# Patient Record
Sex: FEMALE | Race: BLACK OR AFRICAN AMERICAN | Employment: OTHER | URBAN - METROPOLITAN AREA
[De-identification: names, ages, dates, MRNs, and addresses within clinical notes are randomized per-mention and may not be internally consistent; named-entity substitution may affect disease eponyms.]

---

## 2018-04-04 ENCOUNTER — HOSPITAL ENCOUNTER (EMERGENCY)
Age: 33
Discharge: HOME | End: 2018-04-04
Attending: EMERGENCY MEDICINE
Payer: COMMERCIAL

## 2018-04-04 ENCOUNTER — APPOINTMENT (OUTPATIENT)
Dept: CT IMAGING | Age: 33
End: 2018-04-04
Payer: COMMERCIAL

## 2018-04-04 VITALS
TEMPERATURE: 97.9 F | RESPIRATION RATE: 18 BRPM | BODY MASS INDEX: 37.18 KG/M2 | OXYGEN SATURATION: 99 % | WEIGHT: 220 LBS | HEART RATE: 73 BPM | DIASTOLIC BLOOD PRESSURE: 79 MMHG | SYSTOLIC BLOOD PRESSURE: 138 MMHG

## 2018-04-04 DIAGNOSIS — R07.89 CHEST WALL PAIN: Primary | ICD-10-CM

## 2018-04-04 LAB
BASOPHILS ABSOLUTE: 0.03 E9/L (ref 0–0.2)
BASOPHILS RELATIVE PERCENT: 0.4 % (ref 0–2)
CHP ED QC CHECK: YES
EOSINOPHILS ABSOLUTE: 0.1 E9/L (ref 0.05–0.5)
EOSINOPHILS RELATIVE PERCENT: 1.2 % (ref 0–6)
GFR AFRICAN AMERICAN: >60
GFR NON-AFRICAN AMERICAN: >60 ML/MIN/1.73
GLUCOSE BLD-MCNC: 97 MG/DL (ref 74–109)
HCT VFR BLD CALC: 37.9 % (ref 34–48)
HEMOGLOBIN: 12.2 G/DL (ref 11.5–15.5)
IMMATURE GRANULOCYTES #: 0.03 E9/L
IMMATURE GRANULOCYTES %: 0.4 % (ref 0–5)
LYMPHOCYTES ABSOLUTE: 1.9 E9/L (ref 1.5–4)
LYMPHOCYTES RELATIVE PERCENT: 22.4 % (ref 20–42)
MCH RBC QN AUTO: 28.8 PG (ref 26–35)
MCHC RBC AUTO-ENTMCNC: 32.2 % (ref 32–34.5)
MCV RBC AUTO: 89.6 FL (ref 80–99.9)
MONOCYTES ABSOLUTE: 0.4 E9/L (ref 0.1–0.95)
MONOCYTES RELATIVE PERCENT: 4.7 % (ref 2–12)
NEUTROPHILS ABSOLUTE: 6.03 E9/L (ref 1.8–7.3)
NEUTROPHILS RELATIVE PERCENT: 70.9 % (ref 43–80)
PDW BLD-RTO: 13.4 FL (ref 11.5–15)
PLATELET # BLD: 358 E9/L (ref 130–450)
PMV BLD AUTO: 10.6 FL (ref 7–12)
POC CHLORIDE: 108 MMOL/L (ref 100–108)
POC CREATININE: 0.8 MG/DL (ref 0.5–1)
POC POTASSIUM: 4 MMOL/L (ref 3.5–5)
POC SODIUM: 141 MMOL/L (ref 132–146)
PREGNANCY TEST URINE, POC: NEGATIVE
RBC # BLD: 4.23 E12/L (ref 3.5–5.5)
TROPONIN: <0.01 NG/ML (ref 0–0.03)
WBC # BLD: 8.5 E9/L (ref 4.5–11.5)

## 2018-04-04 PROCEDURE — 84484 ASSAY OF TROPONIN QUANT: CPT

## 2018-04-04 PROCEDURE — 6360000004 HC RX CONTRAST MEDICATION: Performed by: RADIOLOGY

## 2018-04-04 PROCEDURE — 82565 ASSAY OF CREATININE: CPT

## 2018-04-04 PROCEDURE — 2580000003 HC RX 258: Performed by: RADIOLOGY

## 2018-04-04 PROCEDURE — 2580000003 HC RX 258: Performed by: NURSE PRACTITIONER

## 2018-04-04 PROCEDURE — 84295 ASSAY OF SERUM SODIUM: CPT

## 2018-04-04 PROCEDURE — 36415 COLL VENOUS BLD VENIPUNCTURE: CPT

## 2018-04-04 PROCEDURE — 84132 ASSAY OF SERUM POTASSIUM: CPT

## 2018-04-04 PROCEDURE — 85025 COMPLETE CBC W/AUTO DIFF WBC: CPT

## 2018-04-04 PROCEDURE — 82435 ASSAY OF BLOOD CHLORIDE: CPT

## 2018-04-04 PROCEDURE — 82947 ASSAY GLUCOSE BLOOD QUANT: CPT

## 2018-04-04 PROCEDURE — 99284 EMERGENCY DEPT VISIT MOD MDM: CPT

## 2018-04-04 PROCEDURE — 71275 CT ANGIOGRAPHY CHEST: CPT

## 2018-04-04 RX ORDER — 0.9 % SODIUM CHLORIDE 0.9 %
500 INTRAVENOUS SOLUTION INTRAVENOUS ONCE
Status: COMPLETED | OUTPATIENT
Start: 2018-04-04 | End: 2018-04-04

## 2018-04-04 RX ORDER — PANTOPRAZOLE SODIUM 40 MG/1
40 TABLET, DELAYED RELEASE ORAL DAILY
COMMUNITY
End: 2019-09-19 | Stop reason: ALTCHOICE

## 2018-04-04 RX ORDER — SODIUM CHLORIDE 0.9 % (FLUSH) 0.9 %
10 SYRINGE (ML) INJECTION ONCE
Status: COMPLETED | OUTPATIENT
Start: 2018-04-04 | End: 2018-04-04

## 2018-04-04 RX ORDER — MULTIVIT-MIN/IRON/FOLIC ACID/K 18-600-40
2000 CAPSULE ORAL DAILY
COMMUNITY
End: 2019-12-19

## 2018-04-04 RX ADMIN — IOPAMIDOL 75 ML: 755 INJECTION, SOLUTION INTRAVENOUS at 11:09

## 2018-04-04 RX ADMIN — Medication 10 ML: at 11:09

## 2018-04-04 RX ADMIN — SODIUM CHLORIDE 500 ML: 9 INJECTION, SOLUTION INTRAVENOUS at 11:21

## 2018-04-04 ASSESSMENT — PAIN DESCRIPTION - DESCRIPTORS: DESCRIPTORS: SPASM;SHARP

## 2018-04-04 ASSESSMENT — PAIN DESCRIPTION - PAIN TYPE: TYPE: ACUTE PAIN

## 2018-04-04 ASSESSMENT — PAIN SCALES - GENERAL: PAINLEVEL_OUTOF10: 7

## 2018-04-04 ASSESSMENT — PAIN DESCRIPTION - LOCATION: LOCATION: CHEST

## 2018-05-02 LAB
EKG ATRIAL RATE: 78 BPM
EKG P AXIS: 55 DEGREES
EKG P-R INTERVAL: 162 MS
EKG Q-T INTERVAL: 388 MS
EKG QRS DURATION: 70 MS
EKG QTC CALCULATION (BAZETT): 442 MS
EKG R AXIS: 24 DEGREES
EKG T AXIS: 13 DEGREES
EKG VENTRICULAR RATE: 78 BPM

## 2018-12-16 ENCOUNTER — HOSPITAL ENCOUNTER (OUTPATIENT)
Age: 33
Setting detail: OBSERVATION
Discharge: HOME | End: 2018-12-19
Attending: EMERGENCY MEDICINE | Admitting: SURGERY
Payer: COMMERCIAL

## 2018-12-16 ENCOUNTER — APPOINTMENT (OUTPATIENT)
Dept: CT IMAGING | Age: 33
End: 2018-12-16
Payer: COMMERCIAL

## 2018-12-16 ENCOUNTER — APPOINTMENT (OUTPATIENT)
Dept: GENERAL RADIOLOGY | Age: 33
End: 2018-12-16
Payer: COMMERCIAL
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
## 2018-12-16 DIAGNOSIS — S80.12XA CONTUSION OF MULTIPLE SITES OF LEFT LOWER EXTREMITY, INITIAL ENCOUNTER: ICD-10-CM

## 2018-12-16 PROBLEM — T14.90XA TRAUMA: Status: ACTIVE | Noted: 2018-12-16

## 2018-12-16 LAB
ABO/RH: NORMAL
ACETAMINOPHEN LEVEL: <5 MCG/ML (ref 10–30)
ALBUMIN SERPL-MCNC: 3.9 G/DL (ref 3.5–5.2)
ALP BLD-CCNC: 96 U/L (ref 35–104)
ALT SERPL-CCNC: 21 U/L (ref 0–32)
ANION GAP SERPL CALCULATED.3IONS-SCNC: 12 MMOL/L (ref 7–16)
ANTIBODY SCREEN: NORMAL
APTT: 32.4 SEC (ref 24.5–35.1)
AST SERPL-CCNC: 24 U/L (ref 0–31)
B.E.: -3 MMOL/L (ref -3–3)
BILIRUB SERPL-MCNC: 0.4 MG/DL (ref 0–1.2)
BUN BLDV-MCNC: 9 MG/DL (ref 6–20)
CALCIUM SERPL-MCNC: 8.7 MG/DL (ref 8.6–10.2)
CHLORIDE BLD-SCNC: 101 MMOL/L (ref 98–107)
CO2: 25 MMOL/L (ref 22–29)
COHB: 0.9 % (ref 0–1.5)
CREAT SERPL-MCNC: 0.8 MG/DL (ref 0.5–1)
CRITICAL: ABNORMAL
DATE ANALYZED: ABNORMAL
DATE OF COLLECTION: ABNORMAL
ETHANOL: <10 MG/DL (ref 0–0.08)
GFR AFRICAN AMERICAN: >60
GFR NON-AFRICAN AMERICAN: >60 ML/MIN/1.73
GLUCOSE BLD-MCNC: 144 MG/DL (ref 74–99)
HCG QUALITATIVE: NEGATIVE
HCO3: 21.2 MMOL/L (ref 22–26)
HCT VFR BLD CALC: 40.4 % (ref 34–48)
HEMOGLOBIN: 12.9 G/DL (ref 11.5–15.5)
HHB: 0.3 % (ref 0–5)
INR BLD: 1
LAB: ABNORMAL
LACTIC ACID: 2.1 MMOL/L (ref 0.5–2.2)
Lab: ABNORMAL
MCH RBC QN AUTO: 28.5 PG (ref 26–35)
MCHC RBC AUTO-ENTMCNC: 31.9 % (ref 32–34.5)
MCV RBC AUTO: 89.4 FL (ref 80–99.9)
METHB: 0.5 % (ref 0–1.5)
MODE: ABNORMAL
O2 CONTENT: 20.1 ML/DL
O2 SATURATION: 99.7 % (ref 92–98.5)
O2HB: 98.3 % (ref 94–97)
OPERATOR ID: ABNORMAL
PATIENT TEMP: 37 C
PCO2: 35.2 MMHG (ref 35–45)
PDW BLD-RTO: 13.7 FL (ref 11.5–15)
PH BLOOD GAS: 7.4 (ref 7.35–7.45)
PLATELET # BLD: 339 E9/L (ref 130–450)
PMV BLD AUTO: 10.7 FL (ref 7–12)
PO2: 430.9 MMHG (ref 60–100)
POTASSIUM SERPL-SCNC: 3.91 MMOL/L (ref 3.3–5.1)
POTASSIUM SERPL-SCNC: 4.1 MMOL/L (ref 3.5–5)
PROTHROMBIN TIME: 11.3 SEC (ref 9.3–12.4)
RBC # BLD: 4.52 E12/L (ref 3.5–5.5)
SALICYLATE, SERUM: <0.3 MG/DL (ref 0–30)
SODIUM BLD-SCNC: 138 MMOL/L (ref 132–146)
SOURCE, BLOOD GAS: ABNORMAL
THB: 13.7 G/DL (ref 11.5–16.5)
TIME ANALYZED: 1313
TOTAL PROTEIN: 7.2 G/DL (ref 6.4–8.3)
TRICYCLIC ANTIDEPRESSANTS SCREEN SERUM: NEGATIVE NG/ML
WBC # BLD: 8.8 E9/L (ref 4.5–11.5)

## 2018-12-16 PROCEDURE — G0480 DRUG TEST DEF 1-7 CLASSES: HCPCS

## 2018-12-16 PROCEDURE — 74177 CT ABD & PELVIS W/CONTRAST: CPT

## 2018-12-16 PROCEDURE — 72131 CT LUMBAR SPINE W/O DYE: CPT

## 2018-12-16 PROCEDURE — 85730 THROMBOPLASTIN TIME PARTIAL: CPT

## 2018-12-16 PROCEDURE — 80053 COMPREHEN METABOLIC PANEL: CPT

## 2018-12-16 PROCEDURE — 82805 BLOOD GASES W/O2 SATURATION: CPT

## 2018-12-16 PROCEDURE — 2700000000 HC OXYGEN THERAPY PER DAY

## 2018-12-16 PROCEDURE — 83605 ASSAY OF LACTIC ACID: CPT

## 2018-12-16 PROCEDURE — 84132 ASSAY OF SERUM POTASSIUM: CPT

## 2018-12-16 PROCEDURE — 86850 RBC ANTIBODY SCREEN: CPT

## 2018-12-16 PROCEDURE — G0378 HOSPITAL OBSERVATION PER HR: HCPCS

## 2018-12-16 PROCEDURE — 36415 COLL VENOUS BLD VENIPUNCTURE: CPT

## 2018-12-16 PROCEDURE — 99219 PR INITIAL OBSERVATION CARE/DAY 50 MINUTES: CPT | Performed by: SURGERY

## 2018-12-16 PROCEDURE — 6810039000 HC L1 TRAUMA ALERT

## 2018-12-16 PROCEDURE — 86901 BLOOD TYPING SEROLOGIC RH(D): CPT

## 2018-12-16 PROCEDURE — 96374 THER/PROPH/DIAG INJ IV PUSH: CPT

## 2018-12-16 PROCEDURE — 99285 EMERGENCY DEPT VISIT HI MDM: CPT

## 2018-12-16 PROCEDURE — 36000 PLACE NEEDLE IN VEIN: CPT | Performed by: SURGERY

## 2018-12-16 PROCEDURE — 71045 X-RAY EXAM CHEST 1 VIEW: CPT

## 2018-12-16 PROCEDURE — APPSS180 APP SPLIT SHARED TIME > 60 MINUTES: Performed by: NURSE PRACTITIONER

## 2018-12-16 PROCEDURE — 73552 X-RAY EXAM OF FEMUR 2/>: CPT

## 2018-12-16 PROCEDURE — 72125 CT NECK SPINE W/O DYE: CPT

## 2018-12-16 PROCEDURE — 84703 CHORIONIC GONADOTROPIN ASSAY: CPT

## 2018-12-16 PROCEDURE — 72170 X-RAY EXAM OF PELVIS: CPT

## 2018-12-16 PROCEDURE — 80307 DRUG TEST PRSMV CHEM ANLYZR: CPT

## 2018-12-16 PROCEDURE — 6370000000 HC RX 637 (ALT 250 FOR IP): Performed by: STUDENT IN AN ORGANIZED HEALTH CARE EDUCATION/TRAINING PROGRAM

## 2018-12-16 PROCEDURE — 70450 CT HEAD/BRAIN W/O DYE: CPT

## 2018-12-16 PROCEDURE — 6360000004 HC RX CONTRAST MEDICATION: Performed by: RADIOLOGY

## 2018-12-16 PROCEDURE — 6370000000 HC RX 637 (ALT 250 FOR IP): Performed by: SURGERY

## 2018-12-16 PROCEDURE — 36410 VNPNXR 3YR/> PHY/QHP DX/THER: CPT | Performed by: SURGERY

## 2018-12-16 PROCEDURE — 85610 PROTHROMBIN TIME: CPT

## 2018-12-16 PROCEDURE — 71260 CT THORAX DX C+: CPT

## 2018-12-16 PROCEDURE — 86900 BLOOD TYPING SEROLOGIC ABO: CPT

## 2018-12-16 PROCEDURE — 72128 CT CHEST SPINE W/O DYE: CPT

## 2018-12-16 PROCEDURE — 2580000003 HC RX 258: Performed by: STUDENT IN AN ORGANIZED HEALTH CARE EDUCATION/TRAINING PROGRAM

## 2018-12-16 PROCEDURE — 6360000002 HC RX W HCPCS: Performed by: STUDENT IN AN ORGANIZED HEALTH CARE EDUCATION/TRAINING PROGRAM

## 2018-12-16 PROCEDURE — 94150 VITAL CAPACITY TEST: CPT

## 2018-12-16 PROCEDURE — 85027 COMPLETE CBC AUTOMATED: CPT

## 2018-12-16 PROCEDURE — 36600 WITHDRAWAL OF ARTERIAL BLOOD: CPT | Performed by: SURGERY

## 2018-12-16 RX ORDER — ACETAMINOPHEN 325 MG/1
650 TABLET ORAL EVERY 6 HOURS PRN
Status: ON HOLD | COMMUNITY

## 2018-12-16 RX ORDER — OXYCODONE HYDROCHLORIDE 5 MG/1
10 TABLET ORAL EVERY 4 HOURS PRN
Status: DISCONTINUED | OUTPATIENT
Start: 2018-12-16 | End: 2018-12-17

## 2018-12-16 RX ORDER — METHOCARBAMOL 750 MG/1
750 TABLET, FILM COATED ORAL 4 TIMES DAILY
Status: DISCONTINUED | OUTPATIENT
Start: 2018-12-16 | End: 2018-12-18

## 2018-12-16 RX ORDER — ONDANSETRON 2 MG/ML
4 INJECTION INTRAMUSCULAR; INTRAVENOUS EVERY 6 HOURS PRN
Status: DISCONTINUED | OUTPATIENT
Start: 2018-12-16 | End: 2018-12-16 | Stop reason: SDUPTHER

## 2018-12-16 RX ORDER — OXYCODONE HYDROCHLORIDE 5 MG/1
5 TABLET ORAL EVERY 4 HOURS PRN
Status: DISCONTINUED | OUTPATIENT
Start: 2018-12-16 | End: 2018-12-17

## 2018-12-16 RX ORDER — SODIUM CHLORIDE 0.9 % (FLUSH) 0.9 %
10 SYRINGE (ML) INJECTION PRN
Status: DISCONTINUED | OUTPATIENT
Start: 2018-12-16 | End: 2018-12-19 | Stop reason: HOSPADM

## 2018-12-16 RX ORDER — SODIUM CHLORIDE 0.9 % (FLUSH) 0.9 %
10 SYRINGE (ML) INJECTION PRN
Status: DISCONTINUED | OUTPATIENT
Start: 2018-12-16 | End: 2018-12-16 | Stop reason: SDUPTHER

## 2018-12-16 RX ORDER — METHOCARBAMOL 500 MG/1
500 TABLET, FILM COATED ORAL 4 TIMES DAILY
Status: DISCONTINUED | OUTPATIENT
Start: 2018-12-16 | End: 2018-12-16 | Stop reason: DRUGHIGH

## 2018-12-16 RX ORDER — ACETAMINOPHEN 325 MG/1
650 TABLET ORAL EVERY 4 HOURS PRN
Status: DISCONTINUED | OUTPATIENT
Start: 2018-12-16 | End: 2018-12-19 | Stop reason: HOSPADM

## 2018-12-16 RX ORDER — HYDROCODONE BITARTRATE AND ACETAMINOPHEN 5; 325 MG/1; MG/1
2 TABLET ORAL EVERY 4 HOURS PRN
Status: DISCONTINUED | OUTPATIENT
Start: 2018-12-16 | End: 2018-12-18

## 2018-12-16 RX ORDER — SODIUM CHLORIDE 9 MG/ML
INJECTION, SOLUTION INTRAVENOUS CONTINUOUS
Status: DISCONTINUED | OUTPATIENT
Start: 2018-12-16 | End: 2018-12-17

## 2018-12-16 RX ORDER — HYDROCODONE BITARTRATE AND ACETAMINOPHEN 5; 325 MG/1; MG/1
1 TABLET ORAL EVERY 4 HOURS PRN
Status: DISCONTINUED | OUTPATIENT
Start: 2018-12-16 | End: 2018-12-18

## 2018-12-16 RX ORDER — SODIUM CHLORIDE 0.9 % (FLUSH) 0.9 %
10 SYRINGE (ML) INJECTION EVERY 12 HOURS SCHEDULED
Status: DISCONTINUED | OUTPATIENT
Start: 2018-12-16 | End: 2018-12-19 | Stop reason: HOSPADM

## 2018-12-16 RX ORDER — ACETAMINOPHEN 325 MG/1
650 TABLET ORAL EVERY 4 HOURS PRN
Status: DISCONTINUED | OUTPATIENT
Start: 2018-12-16 | End: 2018-12-16 | Stop reason: SDUPTHER

## 2018-12-16 RX ORDER — ONDANSETRON 2 MG/ML
4 INJECTION INTRAMUSCULAR; INTRAVENOUS EVERY 6 HOURS PRN
Status: DISCONTINUED | OUTPATIENT
Start: 2018-12-16 | End: 2018-12-19 | Stop reason: HOSPADM

## 2018-12-16 RX ADMIN — ONDANSETRON HYDROCHLORIDE 4 MG: 2 SOLUTION INTRAMUSCULAR; INTRAVENOUS at 21:37

## 2018-12-16 RX ADMIN — IOPAMIDOL 110 ML: 755 INJECTION, SOLUTION INTRAVENOUS at 13:44

## 2018-12-16 RX ADMIN — OXYCODONE HYDROCHLORIDE 5 MG: 5 TABLET ORAL at 16:10

## 2018-12-16 RX ADMIN — OXYCODONE HYDROCHLORIDE 10 MG: 5 TABLET ORAL at 22:29

## 2018-12-16 RX ADMIN — SODIUM CHLORIDE: 9 INJECTION, SOLUTION INTRAVENOUS at 13:55

## 2018-12-16 RX ADMIN — METHOCARBAMOL TABLETS 500 MG: 500 TABLET, COATED ORAL at 16:19

## 2018-12-16 RX ADMIN — METHOCARBAMOL TABLETS 750 MG: 750 TABLET, COATED ORAL at 22:30

## 2018-12-16 ASSESSMENT — PAIN DESCRIPTION - LOCATION: LOCATION: GENERALIZED

## 2018-12-16 ASSESSMENT — PAIN DESCRIPTION - FREQUENCY: FREQUENCY: CONTINUOUS

## 2018-12-16 ASSESSMENT — PAIN DESCRIPTION - ONSET: ONSET: ON-GOING

## 2018-12-16 ASSESSMENT — PAIN SCALES - GENERAL
PAINLEVEL_OUTOF10: 0
PAINLEVEL_OUTOF10: 3
PAINLEVEL_OUTOF10: 7
PAINLEVEL_OUTOF10: 9

## 2018-12-16 ASSESSMENT — PAIN DESCRIPTION - PROGRESSION: CLINICAL_PROGRESSION: NOT CHANGED

## 2018-12-16 ASSESSMENT — PAIN DESCRIPTION - PAIN TYPE: TYPE: ACUTE PAIN

## 2018-12-16 ASSESSMENT — PAIN DESCRIPTION - DESCRIPTORS: DESCRIPTORS: ACHING;CONSTANT;HEADACHE

## 2018-12-16 ASSESSMENT — PAIN DESCRIPTION - ORIENTATION: ORIENTATION: OTHER (COMMENT)

## 2018-12-17 ENCOUNTER — APPOINTMENT (OUTPATIENT)
Dept: MRI IMAGING | Age: 33
End: 2018-12-17
Payer: COMMERCIAL

## 2018-12-17 PROBLEM — R20.0 NUMBNESS AND TINGLING IN LEFT ARM: Status: ACTIVE | Noted: 2018-12-17

## 2018-12-17 PROBLEM — R20.2 NUMBNESS AND TINGLING IN LEFT ARM: Status: ACTIVE | Noted: 2018-12-17

## 2018-12-17 PROBLEM — M54.50 ACUTE LOW BACK PAIN: Status: ACTIVE | Noted: 2018-12-17

## 2018-12-17 LAB
ALBUMIN SERPL-MCNC: 3.4 G/DL (ref 3.5–5.2)
ALP BLD-CCNC: 86 U/L (ref 35–104)
ALT SERPL-CCNC: 20 U/L (ref 0–32)
ANION GAP SERPL CALCULATED.3IONS-SCNC: 9 MMOL/L (ref 7–16)
AST SERPL-CCNC: 24 U/L (ref 0–31)
BASOPHILS ABSOLUTE: 0.02 E9/L (ref 0–0.2)
BASOPHILS RELATIVE PERCENT: 0.2 % (ref 0–2)
BILIRUB SERPL-MCNC: 0.3 MG/DL (ref 0–1.2)
BUN BLDV-MCNC: 10 MG/DL (ref 6–20)
CALCIUM SERPL-MCNC: 8.9 MG/DL (ref 8.6–10.2)
CHLORIDE BLD-SCNC: 104 MMOL/L (ref 98–107)
CO2: 26 MMOL/L (ref 22–29)
CREAT SERPL-MCNC: 0.8 MG/DL (ref 0.5–1)
EOSINOPHILS ABSOLUTE: 0.12 E9/L (ref 0.05–0.5)
EOSINOPHILS RELATIVE PERCENT: 1.3 % (ref 0–6)
GFR AFRICAN AMERICAN: >60
GFR NON-AFRICAN AMERICAN: >60 ML/MIN/1.73
GLUCOSE BLD-MCNC: 104 MG/DL (ref 74–99)
HCT VFR BLD CALC: 37.2 % (ref 34–48)
HEMOGLOBIN: 11.7 G/DL (ref 11.5–15.5)
IMMATURE GRANULOCYTES #: 0.02 E9/L
IMMATURE GRANULOCYTES %: 0.2 % (ref 0–5)
LYMPHOCYTES ABSOLUTE: 2.18 E9/L (ref 1.5–4)
LYMPHOCYTES RELATIVE PERCENT: 24 % (ref 20–42)
MCH RBC QN AUTO: 28.5 PG (ref 26–35)
MCHC RBC AUTO-ENTMCNC: 31.5 % (ref 32–34.5)
MCV RBC AUTO: 90.5 FL (ref 80–99.9)
MONOCYTES ABSOLUTE: 0.56 E9/L (ref 0.1–0.95)
MONOCYTES RELATIVE PERCENT: 6.2 % (ref 2–12)
NEUTROPHILS ABSOLUTE: 6.19 E9/L (ref 1.8–7.3)
NEUTROPHILS RELATIVE PERCENT: 68.1 % (ref 43–80)
PDW BLD-RTO: 14 FL (ref 11.5–15)
PLATELET # BLD: 342 E9/L (ref 130–450)
PMV BLD AUTO: 10.4 FL (ref 7–12)
POTASSIUM REFLEX MAGNESIUM: 4.1 MMOL/L (ref 3.5–5)
RBC # BLD: 4.11 E12/L (ref 3.5–5.5)
SODIUM BLD-SCNC: 139 MMOL/L (ref 132–146)
TOTAL PROTEIN: 6.6 G/DL (ref 6.4–8.3)
WBC # BLD: 9.1 E9/L (ref 4.5–11.5)

## 2018-12-17 PROCEDURE — 72148 MRI LUMBAR SPINE W/O DYE: CPT

## 2018-12-17 PROCEDURE — 6360000002 HC RX W HCPCS: Performed by: STUDENT IN AN ORGANIZED HEALTH CARE EDUCATION/TRAINING PROGRAM

## 2018-12-17 PROCEDURE — 2580000003 HC RX 258: Performed by: STUDENT IN AN ORGANIZED HEALTH CARE EDUCATION/TRAINING PROGRAM

## 2018-12-17 PROCEDURE — 6370000000 HC RX 637 (ALT 250 FOR IP): Performed by: SURGERY

## 2018-12-17 PROCEDURE — 6370000000 HC RX 637 (ALT 250 FOR IP): Performed by: STUDENT IN AN ORGANIZED HEALTH CARE EDUCATION/TRAINING PROGRAM

## 2018-12-17 PROCEDURE — 36415 COLL VENOUS BLD VENIPUNCTURE: CPT

## 2018-12-17 PROCEDURE — 80053 COMPREHEN METABOLIC PANEL: CPT

## 2018-12-17 PROCEDURE — 72146 MRI CHEST SPINE W/O DYE: CPT

## 2018-12-17 PROCEDURE — 72141 MRI NECK SPINE W/O DYE: CPT

## 2018-12-17 PROCEDURE — 99226 PR SBSQ OBSERVATION CARE/DAY 35 MINUTES: CPT | Performed by: SURGERY

## 2018-12-17 PROCEDURE — 85025 COMPLETE CBC W/AUTO DIFF WBC: CPT

## 2018-12-17 PROCEDURE — G0378 HOSPITAL OBSERVATION PER HR: HCPCS

## 2018-12-17 PROCEDURE — 96376 TX/PRO/DX INJ SAME DRUG ADON: CPT

## 2018-12-17 RX ORDER — LORAZEPAM 2 MG/ML
1 INJECTION INTRAMUSCULAR
Status: ACTIVE | OUTPATIENT
Start: 2018-12-17 | End: 2018-12-17

## 2018-12-17 RX ADMIN — METHOCARBAMOL TABLETS 750 MG: 750 TABLET, COATED ORAL at 09:26

## 2018-12-17 RX ADMIN — METHOCARBAMOL TABLETS 750 MG: 750 TABLET, COATED ORAL at 20:18

## 2018-12-17 RX ADMIN — Medication 10 ML: at 20:19

## 2018-12-17 RX ADMIN — SODIUM CHLORIDE: 9 INJECTION, SOLUTION INTRAVENOUS at 01:29

## 2018-12-17 RX ADMIN — METHOCARBAMOL TABLETS 750 MG: 750 TABLET, COATED ORAL at 17:04

## 2018-12-17 RX ADMIN — OXYCODONE HYDROCHLORIDE 10 MG: 5 TABLET ORAL at 06:14

## 2018-12-17 RX ADMIN — ONDANSETRON HYDROCHLORIDE 4 MG: 2 SOLUTION INTRAMUSCULAR; INTRAVENOUS at 08:20

## 2018-12-17 RX ADMIN — ONDANSETRON HYDROCHLORIDE 4 MG: 2 SOLUTION INTRAMUSCULAR; INTRAVENOUS at 15:21

## 2018-12-17 RX ADMIN — METHOCARBAMOL TABLETS 750 MG: 750 TABLET, COATED ORAL at 13:00

## 2018-12-17 RX ADMIN — HYDROCODONE BITARTRATE AND ACETAMINOPHEN 2 TABLET: 5; 325 TABLET ORAL at 21:58

## 2018-12-17 RX ADMIN — HYDROCODONE BITARTRATE AND ACETAMINOPHEN 2 TABLET: 5; 325 TABLET ORAL at 13:49

## 2018-12-17 RX ADMIN — Medication 10 ML: at 08:20

## 2018-12-17 RX ADMIN — ONDANSETRON HYDROCHLORIDE 4 MG: 2 SOLUTION INTRAMUSCULAR; INTRAVENOUS at 21:58

## 2018-12-17 ASSESSMENT — PAIN DESCRIPTION - LOCATION
LOCATION: GENERALIZED

## 2018-12-17 ASSESSMENT — PAIN DESCRIPTION - PAIN TYPE
TYPE: ACUTE PAIN

## 2018-12-17 ASSESSMENT — PAIN DESCRIPTION - ONSET
ONSET: ON-GOING

## 2018-12-17 ASSESSMENT — PAIN SCALES - GENERAL
PAINLEVEL_OUTOF10: 8
PAINLEVEL_OUTOF10: 8
PAINLEVEL_OUTOF10: 9
PAINLEVEL_OUTOF10: 8

## 2018-12-17 ASSESSMENT — PAIN DESCRIPTION - PROGRESSION
CLINICAL_PROGRESSION: NOT CHANGED

## 2018-12-17 ASSESSMENT — PAIN DESCRIPTION - DESCRIPTORS
DESCRIPTORS: ACHING;DISCOMFORT
DESCRIPTORS: ACHING;DISCOMFORT
DESCRIPTORS: ACHING;TINGLING;DISCOMFORT

## 2018-12-17 ASSESSMENT — PAIN DESCRIPTION - FREQUENCY
FREQUENCY: CONTINUOUS

## 2018-12-17 ASSESSMENT — PAIN DESCRIPTION - ORIENTATION
ORIENTATION: OTHER (COMMENT)
ORIENTATION: OTHER (COMMENT)

## 2018-12-18 ENCOUNTER — APPOINTMENT (OUTPATIENT)
Dept: GENERAL RADIOLOGY | Age: 33
End: 2018-12-18
Payer: COMMERCIAL

## 2018-12-18 PROCEDURE — 6360000002 HC RX W HCPCS: Performed by: STUDENT IN AN ORGANIZED HEALTH CARE EDUCATION/TRAINING PROGRAM

## 2018-12-18 PROCEDURE — G8979 MOBILITY GOAL STATUS: HCPCS | Performed by: PHYSICAL THERAPIST

## 2018-12-18 PROCEDURE — 73060 X-RAY EXAM OF HUMERUS: CPT

## 2018-12-18 PROCEDURE — 96376 TX/PRO/DX INJ SAME DRUG ADON: CPT

## 2018-12-18 PROCEDURE — 97161 PT EVAL LOW COMPLEX 20 MIN: CPT | Performed by: PHYSICAL THERAPIST

## 2018-12-18 PROCEDURE — 73090 X-RAY EXAM OF FOREARM: CPT

## 2018-12-18 PROCEDURE — 99226 PR SBSQ OBSERVATION CARE/DAY 35 MINUTES: CPT | Performed by: SURGERY

## 2018-12-18 PROCEDURE — G0378 HOSPITAL OBSERVATION PER HR: HCPCS

## 2018-12-18 PROCEDURE — G8978 MOBILITY CURRENT STATUS: HCPCS | Performed by: PHYSICAL THERAPIST

## 2018-12-18 PROCEDURE — 2580000003 HC RX 258: Performed by: STUDENT IN AN ORGANIZED HEALTH CARE EDUCATION/TRAINING PROGRAM

## 2018-12-18 PROCEDURE — 97165 OT EVAL LOW COMPLEX 30 MIN: CPT

## 2018-12-18 PROCEDURE — G8988 SELF CARE GOAL STATUS: HCPCS

## 2018-12-18 PROCEDURE — G8987 SELF CARE CURRENT STATUS: HCPCS

## 2018-12-18 PROCEDURE — 73070 X-RAY EXAM OF ELBOW: CPT

## 2018-12-18 PROCEDURE — 6370000000 HC RX 637 (ALT 250 FOR IP): Performed by: STUDENT IN AN ORGANIZED HEALTH CARE EDUCATION/TRAINING PROGRAM

## 2018-12-18 RX ORDER — METHOCARBAMOL 500 MG/1
1000 TABLET, FILM COATED ORAL 4 TIMES DAILY
Status: DISCONTINUED | OUTPATIENT
Start: 2018-12-18 | End: 2018-12-19 | Stop reason: HOSPADM

## 2018-12-18 RX ORDER — OXYCODONE HYDROCHLORIDE 5 MG/1
5 TABLET ORAL EVERY 4 HOURS PRN
Status: DISCONTINUED | OUTPATIENT
Start: 2018-12-18 | End: 2018-12-19 | Stop reason: HOSPADM

## 2018-12-18 RX ORDER — MECLIZINE HCL 12.5 MG/1
12.5 TABLET ORAL 3 TIMES DAILY PRN
Status: DISCONTINUED | OUTPATIENT
Start: 2018-12-18 | End: 2018-12-19 | Stop reason: HOSPADM

## 2018-12-18 RX ORDER — OXYCODONE HYDROCHLORIDE 10 MG/1
10 TABLET ORAL EVERY 4 HOURS PRN
Status: DISCONTINUED | OUTPATIENT
Start: 2018-12-18 | End: 2018-12-19 | Stop reason: HOSPADM

## 2018-12-18 RX ORDER — ACETAMINOPHEN 500 MG
500 TABLET ORAL EVERY 6 HOURS
Status: DISCONTINUED | OUTPATIENT
Start: 2018-12-18 | End: 2018-12-19 | Stop reason: HOSPADM

## 2018-12-18 RX ADMIN — OXYCODONE HYDROCHLORIDE 10 MG: 10 TABLET ORAL at 20:44

## 2018-12-18 RX ADMIN — METHOCARBAMOL TABLETS 1000 MG: 500 TABLET, COATED ORAL at 17:24

## 2018-12-18 RX ADMIN — Medication 10 ML: at 20:46

## 2018-12-18 RX ADMIN — ONDANSETRON HYDROCHLORIDE 4 MG: 2 SOLUTION INTRAMUSCULAR; INTRAVENOUS at 07:57

## 2018-12-18 RX ADMIN — OXYCODONE HYDROCHLORIDE 10 MG: 10 TABLET ORAL at 07:57

## 2018-12-18 RX ADMIN — MECLIZINE HYDROCHLORIDE 12.5 MG: 12.5 TABLET, FILM COATED ORAL at 09:22

## 2018-12-18 RX ADMIN — MECLIZINE HYDROCHLORIDE 12.5 MG: 12.5 TABLET, FILM COATED ORAL at 17:24

## 2018-12-18 RX ADMIN — METHOCARBAMOL TABLETS 1000 MG: 500 TABLET, COATED ORAL at 14:46

## 2018-12-18 RX ADMIN — Medication 10 ML: at 07:57

## 2018-12-18 RX ADMIN — METHOCARBAMOL TABLETS 1000 MG: 500 TABLET, COATED ORAL at 20:46

## 2018-12-18 RX ADMIN — ACETAMINOPHEN 500 MG: 500 TABLET, FILM COATED ORAL at 09:22

## 2018-12-18 RX ADMIN — METHOCARBAMOL TABLETS 1000 MG: 500 TABLET, COATED ORAL at 09:22

## 2018-12-18 RX ADMIN — ACETAMINOPHEN 500 MG: 500 TABLET, FILM COATED ORAL at 14:46

## 2018-12-18 RX ADMIN — MECLIZINE HYDROCHLORIDE 12.5 MG: 12.5 TABLET, FILM COATED ORAL at 20:45

## 2018-12-18 ASSESSMENT — PAIN DESCRIPTION - DESCRIPTORS
DESCRIPTORS: SHARP
DESCRIPTORS: ACHING;DISCOMFORT;THROBBING;DULL
DESCRIPTORS: ACHING;DISCOMFORT;THROBBING;DULL
DESCRIPTORS: ACHING;DISCOMFORT;THROBBING
DESCRIPTORS: ACHING;DISCOMFORT;DULL;THROBBING

## 2018-12-18 ASSESSMENT — PAIN DESCRIPTION - ORIENTATION
ORIENTATION: OTHER (COMMENT)

## 2018-12-18 ASSESSMENT — PAIN DESCRIPTION - FREQUENCY
FREQUENCY: CONTINUOUS

## 2018-12-18 ASSESSMENT — PAIN DESCRIPTION - PAIN TYPE
TYPE: ACUTE PAIN

## 2018-12-18 ASSESSMENT — PAIN SCALES - GENERAL
PAINLEVEL_OUTOF10: 8
PAINLEVEL_OUTOF10: 7
PAINLEVEL_OUTOF10: 9
PAINLEVEL_OUTOF10: 7
PAINLEVEL_OUTOF10: 8
PAINLEVEL_OUTOF10: 8
PAINLEVEL_OUTOF10: 6
PAINLEVEL_OUTOF10: 9
PAINLEVEL_OUTOF10: 8

## 2018-12-18 ASSESSMENT — PAIN DESCRIPTION - ONSET
ONSET: ON-GOING

## 2018-12-18 ASSESSMENT — PAIN DESCRIPTION - LOCATION
LOCATION: GENERALIZED

## 2018-12-18 ASSESSMENT — PAIN DESCRIPTION - PROGRESSION
CLINICAL_PROGRESSION: NOT CHANGED

## 2018-12-19 VITALS
DIASTOLIC BLOOD PRESSURE: 76 MMHG | HEIGHT: 65 IN | RESPIRATION RATE: 18 BRPM | WEIGHT: 230 LBS | BODY MASS INDEX: 38.32 KG/M2 | OXYGEN SATURATION: 96 % | HEART RATE: 89 BPM | TEMPERATURE: 97.9 F | SYSTOLIC BLOOD PRESSURE: 136 MMHG

## 2018-12-19 PROCEDURE — 6360000002 HC RX W HCPCS: Performed by: STUDENT IN AN ORGANIZED HEALTH CARE EDUCATION/TRAINING PROGRAM

## 2018-12-19 PROCEDURE — 2580000003 HC RX 258: Performed by: STUDENT IN AN ORGANIZED HEALTH CARE EDUCATION/TRAINING PROGRAM

## 2018-12-19 PROCEDURE — 96376 TX/PRO/DX INJ SAME DRUG ADON: CPT

## 2018-12-19 PROCEDURE — G0378 HOSPITAL OBSERVATION PER HR: HCPCS

## 2018-12-19 PROCEDURE — 99217 PR OBSERVATION CARE DISCHARGE MANAGEMENT: CPT | Performed by: SURGERY

## 2018-12-19 PROCEDURE — 6370000000 HC RX 637 (ALT 250 FOR IP): Performed by: STUDENT IN AN ORGANIZED HEALTH CARE EDUCATION/TRAINING PROGRAM

## 2018-12-19 PROCEDURE — 96372 THER/PROPH/DIAG INJ SC/IM: CPT

## 2018-12-19 PROCEDURE — 97530 THERAPEUTIC ACTIVITIES: CPT

## 2018-12-19 RX ORDER — MECLIZINE HCL 12.5 MG/1
12.5 TABLET ORAL 3 TIMES DAILY PRN
Qty: 30 TABLET | Refills: 0 | Status: SHIPPED | OUTPATIENT
Start: 2018-12-19 | End: 2018-12-29

## 2018-12-19 RX ORDER — OXYCODONE HYDROCHLORIDE 5 MG/1
5 TABLET ORAL EVERY 6 HOURS PRN
Qty: 28 TABLET | Refills: 0 | Status: SHIPPED | OUTPATIENT
Start: 2018-12-19 | End: 2018-12-26

## 2018-12-19 RX ORDER — METHOCARBAMOL 500 MG/1
1000 TABLET, FILM COATED ORAL 4 TIMES DAILY
Qty: 80 TABLET | Refills: 0 | Status: SHIPPED | OUTPATIENT
Start: 2018-12-19 | End: 2018-12-29

## 2018-12-19 RX ADMIN — METHOCARBAMOL TABLETS 1000 MG: 500 TABLET, COATED ORAL at 08:38

## 2018-12-19 RX ADMIN — ONDANSETRON HYDROCHLORIDE 4 MG: 2 SOLUTION INTRAMUSCULAR; INTRAVENOUS at 15:13

## 2018-12-19 RX ADMIN — Medication 10 ML: at 08:38

## 2018-12-19 RX ADMIN — MECLIZINE HYDROCHLORIDE 12.5 MG: 12.5 TABLET, FILM COATED ORAL at 15:13

## 2018-12-19 RX ADMIN — ACETAMINOPHEN 500 MG: 500 TABLET, FILM COATED ORAL at 02:51

## 2018-12-19 RX ADMIN — OXYCODONE HYDROCHLORIDE 10 MG: 10 TABLET ORAL at 08:38

## 2018-12-19 RX ADMIN — ENOXAPARIN SODIUM 30 MG: 30 INJECTION SUBCUTANEOUS at 08:38

## 2018-12-19 RX ADMIN — ACETAMINOPHEN 500 MG: 500 TABLET, FILM COATED ORAL at 12:59

## 2018-12-19 RX ADMIN — OXYCODONE HYDROCHLORIDE 10 MG: 10 TABLET ORAL at 12:59

## 2018-12-19 RX ADMIN — METHOCARBAMOL TABLETS 1000 MG: 500 TABLET, COATED ORAL at 12:59

## 2018-12-19 RX ADMIN — MECLIZINE HYDROCHLORIDE 12.5 MG: 12.5 TABLET, FILM COATED ORAL at 06:54

## 2018-12-19 RX ADMIN — ACETAMINOPHEN 500 MG: 500 TABLET, FILM COATED ORAL at 08:38

## 2018-12-19 ASSESSMENT — PAIN SCALES - GENERAL
PAINLEVEL_OUTOF10: 8
PAINLEVEL_OUTOF10: 5

## 2018-12-19 ASSESSMENT — PAIN DESCRIPTION - DESCRIPTORS
DESCRIPTORS: ACHING;CONSTANT;DISCOMFORT;SORE
DESCRIPTORS: SHARP

## 2018-12-19 ASSESSMENT — PAIN DESCRIPTION - PAIN TYPE
TYPE: ACUTE PAIN
TYPE: ACUTE PAIN

## 2018-12-19 ASSESSMENT — PAIN DESCRIPTION - FREQUENCY
FREQUENCY: CONTINUOUS
FREQUENCY: CONTINUOUS

## 2018-12-19 ASSESSMENT — PAIN DESCRIPTION - PROGRESSION
CLINICAL_PROGRESSION: NOT CHANGED
CLINICAL_PROGRESSION: NOT CHANGED

## 2018-12-19 ASSESSMENT — PAIN DESCRIPTION - LOCATION
LOCATION: GENERALIZED
LOCATION: GENERALIZED

## 2018-12-19 ASSESSMENT — PAIN DESCRIPTION - ORIENTATION
ORIENTATION: OTHER (COMMENT)
ORIENTATION: OTHER (COMMENT)

## 2018-12-19 ASSESSMENT — PAIN DESCRIPTION - ONSET
ONSET: ON-GOING
ONSET: ON-GOING

## 2019-01-02 ENCOUNTER — TELEPHONE (OUTPATIENT)
Dept: SURGERY | Age: 34
End: 2019-01-02

## 2019-01-08 ENCOUNTER — OFFICE VISIT (OUTPATIENT)
Dept: SURGERY | Age: 34
End: 2019-01-08
Payer: COMMERCIAL

## 2019-01-08 VITALS
TEMPERATURE: 97.9 F | HEART RATE: 94 BPM | HEIGHT: 65 IN | WEIGHT: 249.5 LBS | RESPIRATION RATE: 16 BRPM | BODY MASS INDEX: 41.57 KG/M2 | OXYGEN SATURATION: 97 %
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
## 2019-01-08 DIAGNOSIS — S80.12XD CONTUSION OF MULTIPLE SITES OF LEFT LOWER EXTREMITY, SUBSEQUENT ENCOUNTER: ICD-10-CM

## 2019-01-08 DIAGNOSIS — S06.0X1D CONCUSSION WITH LOSS OF CONSCIOUSNESS OF 30 MINUTES OR LESS, SUBSEQUENT ENCOUNTER: ICD-10-CM

## 2019-01-08 DIAGNOSIS — T14.90XA TRAUMA: ICD-10-CM

## 2019-01-08 DIAGNOSIS — S13.9XXD NECK SPRAIN, SUBSEQUENT ENCOUNTER: ICD-10-CM

## 2019-01-08 DIAGNOSIS — Z09 FOLLOW UP: Primary | ICD-10-CM

## 2019-01-08 PROCEDURE — G8484 FLU IMMUNIZE NO ADMIN: HCPCS | Performed by: NURSE PRACTITIONER

## 2019-01-08 PROCEDURE — 99212 OFFICE O/P EST SF 10 MIN: CPT | Performed by: NURSE PRACTITIONER

## 2019-01-08 PROCEDURE — G8417 CALC BMI ABV UP PARAM F/U: HCPCS | Performed by: NURSE PRACTITIONER

## 2019-01-08 PROCEDURE — 1036F TOBACCO NON-USER: CPT | Performed by: NURSE PRACTITIONER

## 2019-01-08 PROCEDURE — G8427 DOCREV CUR MEDS BY ELIG CLIN: HCPCS | Performed by: NURSE PRACTITIONER

## 2019-01-08 RX ORDER — ONDANSETRON 4 MG/1
4 TABLET, FILM COATED ORAL EVERY 12 HOURS PRN
Qty: 14 TABLET | Refills: 0 | Status: SHIPPED | OUTPATIENT
Start: 2019-01-08 | End: 2019-01-15

## 2019-01-08 RX ORDER — MECLIZINE HYDROCHLORIDE 25 MG/1
25 TABLET ORAL 3 TIMES DAILY PRN
Qty: 30 TABLET | Refills: 0 | Status: SHIPPED | OUTPATIENT
Start: 2019-01-08 | End: 2019-01-18

## 2019-01-08 RX ORDER — MAG HYDROX/ALUMINUM HYD/SIMETH 400-400-40
10 SUSPENSION, ORAL (FINAL DOSE FORM) ORAL
Refills: 2 | COMMUNITY
Start: 2018-10-29 | End: 2021-12-21

## 2019-01-08 RX ORDER — OXYCODONE HYDROCHLORIDE 5 MG/1
5 CAPSULE ORAL EVERY 6 HOURS PRN
Qty: 28 CAPSULE | Refills: 0 | Status: SHIPPED | OUTPATIENT
Start: 2019-01-08 | End: 2019-01-15

## 2019-01-08 RX ORDER — PANTOPRAZOLE SODIUM 40 MG/1
40 TABLET, DELAYED RELEASE ORAL PRN
Refills: 1 | COMMUNITY
Start: 2018-10-29 | End: 2019-02-12 | Stop reason: CLARIF

## 2019-01-08 RX ORDER — METHOCARBAMOL 750 MG/1
750 TABLET, FILM COATED ORAL 4 TIMES DAILY
Qty: 40 TABLET | Refills: 0 | Status: SHIPPED | OUTPATIENT
Start: 2019-01-08 | End: 2019-01-18

## 2019-01-08 RX ORDER — HYDROCODONE BITARTRATE AND ACETAMINOPHEN 5; 325 MG/1; MG/1
325 TABLET ORAL
Refills: 0 | COMMUNITY
Start: 2018-11-30 | End: 2019-01-08 | Stop reason: ALTCHOICE

## 2019-01-08 RX ORDER — ACETAMINOPHEN 160 MG
2000 TABLET,DISINTEGRATING ORAL DAILY
Refills: 0 | COMMUNITY
Start: 2018-10-29 | End: 2019-04-02 | Stop reason: SDUPTHER

## 2019-01-15 ENCOUNTER — OFFICE VISIT (OUTPATIENT)
Dept: SURGERY | Age: 34
End: 2019-01-15
Payer: COMMERCIAL

## 2019-01-15 VITALS
BODY MASS INDEX: 41.65 KG/M2 | OXYGEN SATURATION: 98 % | DIASTOLIC BLOOD PRESSURE: 94 MMHG | WEIGHT: 250 LBS | SYSTOLIC BLOOD PRESSURE: 150 MMHG | HEART RATE: 90 BPM | RESPIRATION RATE: 18 BRPM | HEIGHT: 65 IN

## 2019-01-15 DIAGNOSIS — S13.9XXD NECK SPRAIN, SUBSEQUENT ENCOUNTER: ICD-10-CM

## 2019-01-15 DIAGNOSIS — M79.605 PAIN OF LEFT LOWER EXTREMITY: Primary | ICD-10-CM

## 2019-01-15 DIAGNOSIS — S06.0X1D CONCUSSION WITH LOSS OF CONSCIOUSNESS OF 30 MINUTES OR LESS, SUBSEQUENT ENCOUNTER: ICD-10-CM

## 2019-01-15 DIAGNOSIS — Z09 FOLLOW UP: ICD-10-CM

## 2019-01-15 DIAGNOSIS — S39.012D BACK STRAIN, SUBSEQUENT ENCOUNTER: ICD-10-CM

## 2019-01-15 DIAGNOSIS — F41.9 ANXIETY: ICD-10-CM

## 2019-01-15 DIAGNOSIS — S80.12XD CONTUSION OF MULTIPLE SITES OF LEFT LOWER EXTREMITY, SUBSEQUENT ENCOUNTER: ICD-10-CM

## 2019-01-15 PROCEDURE — G8417 CALC BMI ABV UP PARAM F/U: HCPCS | Performed by: NURSE PRACTITIONER

## 2019-01-15 PROCEDURE — G8484 FLU IMMUNIZE NO ADMIN: HCPCS | Performed by: NURSE PRACTITIONER

## 2019-01-15 PROCEDURE — 99212 OFFICE O/P EST SF 10 MIN: CPT | Performed by: NURSE PRACTITIONER

## 2019-01-15 PROCEDURE — G8427 DOCREV CUR MEDS BY ELIG CLIN: HCPCS | Performed by: NURSE PRACTITIONER

## 2019-01-15 PROCEDURE — 1036F TOBACCO NON-USER: CPT | Performed by: NURSE PRACTITIONER

## 2019-01-15 RX ORDER — CYCLOBENZAPRINE HCL 10 MG
10 TABLET ORAL NIGHTLY PRN
Qty: 30 TABLET | Refills: 0 | Status: SHIPPED
Start: 2019-01-15 | End: 2020-03-20 | Stop reason: SDUPTHER

## 2019-01-25 ENCOUNTER — HOSPITAL ENCOUNTER (OUTPATIENT)
Dept: PHYSICAL THERAPY | Age: 34
Setting detail: THERAPIES SERIES
Discharge: HOME | End: 2019-01-25
Payer: COMMERCIAL

## 2019-01-25 PROCEDURE — 97162 PT EVAL MOD COMPLEX 30 MIN: CPT | Performed by: PHYSICAL THERAPIST

## 2019-01-25 ASSESSMENT — PAIN - FUNCTIONAL ASSESSMENT: PAIN_FUNCTIONAL_ASSESSMENT: PREVENTS OR INTERFERES WITH ALL ACTIVE AND SOME PASSIVE ACTIVITIES

## 2019-01-25 ASSESSMENT — PAIN SCALES - GENERAL: PAINLEVEL_OUTOF10: 9

## 2019-01-25 ASSESSMENT — PAIN DESCRIPTION - LOCATION: LOCATION: BACK;LEG

## 2019-01-25 ASSESSMENT — PAIN DESCRIPTION - ORIENTATION: ORIENTATION: LEFT

## 2019-01-25 ASSESSMENT — PAIN DESCRIPTION - DESCRIPTORS: DESCRIPTORS: ACHING;CONSTANT

## 2019-01-25 ASSESSMENT — PAIN DESCRIPTION - PAIN TYPE: TYPE: ACUTE PAIN

## 2019-01-28 ENCOUNTER — TELEPHONE (OUTPATIENT)
Dept: PHYSICAL MEDICINE AND REHAB | Age: 34
End: 2019-01-28

## 2019-01-29 ENCOUNTER — OFFICE VISIT (OUTPATIENT)
Dept: SURGERY | Age: 34
End: 2019-01-29
Payer: COMMERCIAL

## 2019-01-29 ENCOUNTER — HOSPITAL ENCOUNTER (OUTPATIENT)
Dept: PHYSICAL THERAPY | Age: 34
Setting detail: THERAPIES SERIES
Discharge: HOME | End: 2019-01-29
Payer: COMMERCIAL

## 2019-01-29 VITALS
OXYGEN SATURATION: 96 % | BODY MASS INDEX: 42.32 KG/M2 | SYSTOLIC BLOOD PRESSURE: 135 MMHG | DIASTOLIC BLOOD PRESSURE: 99 MMHG | HEART RATE: 106 BPM | WEIGHT: 254 LBS | RESPIRATION RATE: 17 BRPM | HEIGHT: 65 IN
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
## 2019-01-29 DIAGNOSIS — S06.0X1D CONCUSSION WITH LOSS OF CONSCIOUSNESS OF 30 MINUTES OR LESS, SUBSEQUENT ENCOUNTER: ICD-10-CM

## 2019-01-29 PROCEDURE — 97530 THERAPEUTIC ACTIVITIES: CPT | Performed by: PHYSICAL THERAPIST

## 2019-01-29 PROCEDURE — 99212 OFFICE O/P EST SF 10 MIN: CPT | Performed by: NURSE PRACTITIONER

## 2019-01-29 PROCEDURE — G8484 FLU IMMUNIZE NO ADMIN: HCPCS | Performed by: NURSE PRACTITIONER

## 2019-01-29 PROCEDURE — 1036F TOBACCO NON-USER: CPT | Performed by: NURSE PRACTITIONER

## 2019-01-29 PROCEDURE — 97110 THERAPEUTIC EXERCISES: CPT | Performed by: PHYSICAL THERAPIST

## 2019-01-29 PROCEDURE — G8427 DOCREV CUR MEDS BY ELIG CLIN: HCPCS | Performed by: NURSE PRACTITIONER

## 2019-01-29 PROCEDURE — G0283 ELEC STIM OTHER THAN WOUND: HCPCS | Performed by: PHYSICAL THERAPIST

## 2019-01-29 PROCEDURE — G8417 CALC BMI ABV UP PARAM F/U: HCPCS | Performed by: NURSE PRACTITIONER

## 2019-01-29 RX ORDER — MECLIZINE HYDROCHLORIDE 25 MG/1
25 TABLET ORAL 3 TIMES DAILY PRN
COMMUNITY
End: 2021-12-21

## 2019-01-29 RX ORDER — OXYCODONE HYDROCHLORIDE 5 MG/1
TABLET ORAL
Refills: 0 | COMMUNITY
Start: 2019-01-08 | End: 2019-09-19 | Stop reason: ALTCHOICE

## 2019-01-29 RX ORDER — METAXALONE 800 MG/1
800 TABLET ORAL 3 TIMES DAILY
Qty: 30 TABLET | Refills: 0 | Status: SHIPPED | OUTPATIENT
Start: 2019-01-29 | End: 2019-02-08

## 2019-01-29 RX ORDER — ONDANSETRON 4 MG/1
4 TABLET, FILM COATED ORAL EVERY 8 HOURS PRN
COMMUNITY
End: 2022-04-25 | Stop reason: ALTCHOICE

## 2019-01-29 RX ORDER — SCOLOPAMINE TRANSDERMAL SYSTEM 1 MG/1
1 PATCH, EXTENDED RELEASE TRANSDERMAL
Qty: 3 PATCH | Refills: 1 | Status: SHIPPED | OUTPATIENT
Start: 2019-01-29 | End: 2019-12-19

## 2019-01-29 RX ORDER — CYCLOBENZAPRINE HCL 10 MG
10 TABLET ORAL 3 TIMES DAILY PRN
COMMUNITY
End: 2019-03-26 | Stop reason: SINTOL

## 2019-01-31 ENCOUNTER — HOSPITAL ENCOUNTER (OUTPATIENT)
Dept: PHYSICAL THERAPY | Age: 34
Setting detail: THERAPIES SERIES
Discharge: HOME | End: 2019-01-31
Payer: COMMERCIAL

## 2019-02-05 ENCOUNTER — HOSPITAL ENCOUNTER (OUTPATIENT)
Dept: PHYSICAL THERAPY | Age: 34
Setting detail: THERAPIES SERIES
Discharge: HOME | End: 2019-02-05
Payer: COMMERCIAL

## 2019-02-05 PROCEDURE — G0283 ELEC STIM OTHER THAN WOUND: HCPCS | Performed by: PHYSICAL THERAPIST

## 2019-02-05 PROCEDURE — 97110 THERAPEUTIC EXERCISES: CPT | Performed by: PHYSICAL THERAPIST

## 2019-02-05 PROCEDURE — 97530 THERAPEUTIC ACTIVITIES: CPT | Performed by: PHYSICAL THERAPIST

## 2019-02-06 ENCOUNTER — HOSPITAL ENCOUNTER (OUTPATIENT)
Dept: PHYSICAL THERAPY | Age: 34
Setting detail: THERAPIES SERIES
Discharge: HOME | End: 2019-02-06
Payer: COMMERCIAL

## 2019-02-06 PROCEDURE — 97110 THERAPEUTIC EXERCISES: CPT | Performed by: PHYSICAL THERAPIST

## 2019-02-06 PROCEDURE — 97530 THERAPEUTIC ACTIVITIES: CPT | Performed by: PHYSICAL THERAPIST

## 2019-02-06 PROCEDURE — G0283 ELEC STIM OTHER THAN WOUND: HCPCS | Performed by: PHYSICAL THERAPIST

## 2019-02-12 ENCOUNTER — OFFICE VISIT (OUTPATIENT)
Dept: PHYSICAL MEDICINE AND REHAB | Age: 34
End: 2019-02-12
Payer: COMMERCIAL

## 2019-02-12 VITALS
HEIGHT: 65 IN | DIASTOLIC BLOOD PRESSURE: 87 MMHG | SYSTOLIC BLOOD PRESSURE: 128 MMHG | HEART RATE: 95 BPM | WEIGHT: 250 LBS | BODY MASS INDEX: 41.65 KG/M2

## 2019-02-12 DIAGNOSIS — H93.11 TINNITUS OF RIGHT EAR: Primary | ICD-10-CM

## 2019-02-12 DIAGNOSIS — S06.0X1S CONCUSSION WITH LOSS OF CONSCIOUSNESS OF 30 MINUTES OR LESS, SEQUELA (HCC): ICD-10-CM

## 2019-02-12 DIAGNOSIS — G44.311 INTRACTABLE ACUTE POST-TRAUMATIC HEADACHE: ICD-10-CM

## 2019-02-12 DIAGNOSIS — R42 POST-CONCUSSION VERTIGO: ICD-10-CM

## 2019-02-12 DIAGNOSIS — F07.81 POST-CONCUSSION VERTIGO: ICD-10-CM

## 2019-02-12 PROCEDURE — 96132 NRPSYC TST EVAL PHYS/QHP 1ST: CPT | Performed by: PHYSICAL MEDICINE & REHABILITATION

## 2019-02-12 PROCEDURE — 99204 OFFICE O/P NEW MOD 45 MIN: CPT | Performed by: PHYSICAL MEDICINE & REHABILITATION

## 2019-02-12 PROCEDURE — G8484 FLU IMMUNIZE NO ADMIN: HCPCS | Performed by: PHYSICAL MEDICINE & REHABILITATION

## 2019-02-12 PROCEDURE — G8427 DOCREV CUR MEDS BY ELIG CLIN: HCPCS | Performed by: PHYSICAL MEDICINE & REHABILITATION

## 2019-02-12 PROCEDURE — G8417 CALC BMI ABV UP PARAM F/U: HCPCS | Performed by: PHYSICAL MEDICINE & REHABILITATION

## 2019-02-12 PROCEDURE — 1036F TOBACCO NON-USER: CPT | Performed by: PHYSICAL MEDICINE & REHABILITATION

## 2019-02-12 RX ORDER — METAXALONE 800 MG/1
800 TABLET ORAL 3 TIMES DAILY
COMMUNITY
End: 2019-12-19

## 2019-02-13 ENCOUNTER — TELEPHONE (OUTPATIENT)
Dept: PHYSICAL MEDICINE AND REHAB | Age: 34
End: 2019-02-13

## 2019-02-13 ENCOUNTER — HOSPITAL ENCOUNTER (OUTPATIENT)
Dept: PHYSICAL THERAPY | Age: 34
Setting detail: THERAPIES SERIES
Discharge: HOME | End: 2019-02-13
Payer: COMMERCIAL

## 2019-02-13 PROCEDURE — 97110 THERAPEUTIC EXERCISES: CPT | Performed by: PHYSICAL THERAPIST

## 2019-02-13 PROCEDURE — G0283 ELEC STIM OTHER THAN WOUND: HCPCS | Performed by: PHYSICAL THERAPIST

## 2019-02-13 PROCEDURE — 97530 THERAPEUTIC ACTIVITIES: CPT | Performed by: PHYSICAL THERAPIST

## 2019-02-15 ENCOUNTER — HOSPITAL ENCOUNTER (OUTPATIENT)
Dept: PHYSICAL THERAPY | Age: 34
Setting detail: THERAPIES SERIES
Discharge: HOME | End: 2019-02-15
Payer: COMMERCIAL

## 2019-02-15 PROCEDURE — 97530 THERAPEUTIC ACTIVITIES: CPT | Performed by: PHYSICAL THERAPIST

## 2019-02-15 PROCEDURE — 97110 THERAPEUTIC EXERCISES: CPT | Performed by: PHYSICAL THERAPIST

## 2019-02-15 PROCEDURE — G0283 ELEC STIM OTHER THAN WOUND: HCPCS | Performed by: PHYSICAL THERAPIST

## 2019-02-22 ENCOUNTER — HOSPITAL ENCOUNTER (OUTPATIENT)
Dept: PHYSICAL THERAPY | Age: 34
Setting detail: THERAPIES SERIES
Discharge: HOME | End: 2019-02-22
Payer: COMMERCIAL

## 2019-02-22 PROCEDURE — G0283 ELEC STIM OTHER THAN WOUND: HCPCS | Performed by: PHYSICAL THERAPIST

## 2019-02-22 PROCEDURE — 97110 THERAPEUTIC EXERCISES: CPT | Performed by: PHYSICAL THERAPIST

## 2019-02-22 PROCEDURE — 97530 THERAPEUTIC ACTIVITIES: CPT | Performed by: PHYSICAL THERAPIST

## 2019-02-27 ENCOUNTER — HOSPITAL ENCOUNTER (OUTPATIENT)
Dept: PHYSICAL THERAPY | Age: 34
Setting detail: THERAPIES SERIES
Discharge: HOME | End: 2019-02-27
Payer: COMMERCIAL

## 2019-03-01 ENCOUNTER — HOSPITAL ENCOUNTER (OUTPATIENT)
Dept: PHYSICAL THERAPY | Age: 34
Setting detail: THERAPIES SERIES
Discharge: HOME | End: 2019-03-01
Payer: COMMERCIAL

## 2019-03-01 PROCEDURE — 97530 THERAPEUTIC ACTIVITIES: CPT | Performed by: PHYSICAL THERAPIST

## 2019-03-01 PROCEDURE — G0283 ELEC STIM OTHER THAN WOUND: HCPCS | Performed by: PHYSICAL THERAPIST

## 2019-03-01 PROCEDURE — 97110 THERAPEUTIC EXERCISES: CPT | Performed by: PHYSICAL THERAPIST

## 2019-03-06 ENCOUNTER — HOSPITAL ENCOUNTER (OUTPATIENT)
Dept: PHYSICAL THERAPY | Age: 34
Setting detail: THERAPIES SERIES
Discharge: HOME | End: 2019-03-06
Payer: COMMERCIAL

## 2019-03-06 PROCEDURE — G0283 ELEC STIM OTHER THAN WOUND: HCPCS | Performed by: PHYSICAL THERAPIST

## 2019-03-06 PROCEDURE — 97530 THERAPEUTIC ACTIVITIES: CPT | Performed by: PHYSICAL THERAPIST

## 2019-03-06 PROCEDURE — 97110 THERAPEUTIC EXERCISES: CPT | Performed by: PHYSICAL THERAPIST

## 2019-03-08 ENCOUNTER — HOSPITAL ENCOUNTER (OUTPATIENT)
Dept: PHYSICAL THERAPY | Age: 34
Setting detail: THERAPIES SERIES
Discharge: HOME | End: 2019-03-08
Payer: COMMERCIAL

## 2019-03-08 PROCEDURE — 97110 THERAPEUTIC EXERCISES: CPT | Performed by: PHYSICAL THERAPIST

## 2019-03-08 PROCEDURE — G0283 ELEC STIM OTHER THAN WOUND: HCPCS | Performed by: PHYSICAL THERAPIST

## 2019-03-08 PROCEDURE — 97530 THERAPEUTIC ACTIVITIES: CPT | Performed by: PHYSICAL THERAPIST

## 2019-03-13 ENCOUNTER — HOSPITAL ENCOUNTER (OUTPATIENT)
Dept: PHYSICAL THERAPY | Age: 34
Setting detail: THERAPIES SERIES
Discharge: HOME | End: 2019-03-13
Payer: COMMERCIAL

## 2019-03-13 PROCEDURE — 97110 THERAPEUTIC EXERCISES: CPT | Performed by: PHYSICAL THERAPIST

## 2019-03-13 PROCEDURE — 97530 THERAPEUTIC ACTIVITIES: CPT | Performed by: PHYSICAL THERAPIST

## 2019-03-13 PROCEDURE — G0283 ELEC STIM OTHER THAN WOUND: HCPCS | Performed by: PHYSICAL THERAPIST

## 2019-03-15 ENCOUNTER — HOSPITAL ENCOUNTER (OUTPATIENT)
Dept: PHYSICAL THERAPY | Age: 34
Setting detail: THERAPIES SERIES
Discharge: HOME | End: 2019-03-15
Payer: COMMERCIAL

## 2019-03-20 ENCOUNTER — HOSPITAL ENCOUNTER (OUTPATIENT)
Dept: PHYSICAL THERAPY | Age: 34
Setting detail: THERAPIES SERIES
Discharge: HOME | End: 2019-03-20
Payer: COMMERCIAL

## 2019-03-26 ENCOUNTER — OFFICE VISIT (OUTPATIENT)
Dept: PHYSICAL MEDICINE AND REHAB | Age: 34
End: 2019-03-26
Payer: COMMERCIAL

## 2019-03-26 VITALS
WEIGHT: 256 LBS | HEIGHT: 65 IN | SYSTOLIC BLOOD PRESSURE: 122 MMHG | HEART RATE: 91 BPM | BODY MASS INDEX: 42.65 KG/M2 | DIASTOLIC BLOOD PRESSURE: 74 MMHG

## 2019-03-26 DIAGNOSIS — S06.0X1S CONCUSSION WITH LOSS OF CONSCIOUSNESS OF 30 MINUTES OR LESS, SEQUELA (HCC): ICD-10-CM

## 2019-03-26 DIAGNOSIS — H93.11 TINNITUS OF RIGHT EAR: Primary | ICD-10-CM

## 2019-03-26 DIAGNOSIS — G44.311 INTRACTABLE ACUTE POST-TRAUMATIC HEADACHE: ICD-10-CM

## 2019-03-26 DIAGNOSIS — F07.81 POST-CONCUSSION VERTIGO: ICD-10-CM

## 2019-03-26 DIAGNOSIS — R42 POST-CONCUSSION VERTIGO: ICD-10-CM

## 2019-03-26 DIAGNOSIS — G47.01 INSOMNIA DUE TO MEDICAL CONDITION: ICD-10-CM

## 2019-03-26 PROCEDURE — 96132 NRPSYC TST EVAL PHYS/QHP 1ST: CPT | Performed by: PHYSICAL MEDICINE & REHABILITATION

## 2019-03-26 PROCEDURE — G8417 CALC BMI ABV UP PARAM F/U: HCPCS | Performed by: PHYSICAL MEDICINE & REHABILITATION

## 2019-03-26 PROCEDURE — 99214 OFFICE O/P EST MOD 30 MIN: CPT | Performed by: PHYSICAL MEDICINE & REHABILITATION

## 2019-03-26 PROCEDURE — G8427 DOCREV CUR MEDS BY ELIG CLIN: HCPCS | Performed by: PHYSICAL MEDICINE & REHABILITATION

## 2019-03-26 PROCEDURE — 1036F TOBACCO NON-USER: CPT | Performed by: PHYSICAL MEDICINE & REHABILITATION

## 2019-03-26 PROCEDURE — G8484 FLU IMMUNIZE NO ADMIN: HCPCS | Performed by: PHYSICAL MEDICINE & REHABILITATION

## 2019-03-26 RX ORDER — AMITRIPTYLINE HYDROCHLORIDE 25 MG/1
25 TABLET, FILM COATED ORAL NIGHTLY
Qty: 30 TABLET | Refills: 1 | Status: SHIPPED | OUTPATIENT
Start: 2019-03-26 | End: 2019-06-20 | Stop reason: DRUGHIGH

## 2019-04-01 ENCOUNTER — TELEPHONE (OUTPATIENT)
Dept: PHYSICAL MEDICINE AND REHAB | Age: 34
End: 2019-04-01

## 2019-04-01 NOTE — TELEPHONE ENCOUNTER
Form was found and completed for a continuous absence from work until 4/27/19 when she sees ENT. Form completed to [phone] and pt notified.

## 2019-04-01 NOTE — TELEPHONE ENCOUNTER
Patient called to check on the status of her FMLA forms she brought in at her 3/26/19 appointment. She said that her employer called to inform her they had not received it yet. I checked on this and did not find any FMLA paperwork. Patient stated that she was told by Felipe Reddy that the form would be completed. However, Felipe Reddy never received the form. I informed the patient to contact her employer to see if they can fax over another form. She voiced understanding.

## 2019-04-02 ENCOUNTER — PROCEDURE VISIT (OUTPATIENT)
Dept: AUDIOLOGY | Age: 34
End: 2019-04-02
Payer: COMMERCIAL

## 2019-04-02 ENCOUNTER — OFFICE VISIT (OUTPATIENT)
Dept: ENT CLINIC | Age: 34
End: 2019-04-02
Payer: COMMERCIAL

## 2019-04-02 VITALS
BODY MASS INDEX: 43.17 KG/M2 | HEART RATE: 83 BPM | SYSTOLIC BLOOD PRESSURE: 123 MMHG | HEIGHT: 65 IN | DIASTOLIC BLOOD PRESSURE: 87 MMHG | WEIGHT: 259.1 LBS

## 2019-04-02 DIAGNOSIS — H93.11 TINNITUS OF RIGHT EAR: Primary | ICD-10-CM

## 2019-04-02 PROCEDURE — 92557 COMPREHENSIVE HEARING TEST: CPT | Performed by: AUDIOLOGIST

## 2019-04-02 PROCEDURE — G8427 DOCREV CUR MEDS BY ELIG CLIN: HCPCS | Performed by: OTOLARYNGOLOGY

## 2019-04-02 PROCEDURE — 1036F TOBACCO NON-USER: CPT | Performed by: OTOLARYNGOLOGY

## 2019-04-02 PROCEDURE — 99203 OFFICE O/P NEW LOW 30 MIN: CPT | Performed by: OTOLARYNGOLOGY

## 2019-04-02 PROCEDURE — G8417 CALC BMI ABV UP PARAM F/U: HCPCS | Performed by: OTOLARYNGOLOGY

## 2019-04-02 PROCEDURE — 92567 TYMPANOMETRY: CPT | Performed by: AUDIOLOGIST

## 2019-04-20 ASSESSMENT — ENCOUNTER SYMPTOMS
SHORTNESS OF BREATH: 0
VOMITING: 0
SORE THROAT: 0
COUGH: 0
SINUS PAIN: 0
RHINORRHEA: 0

## 2019-04-26 ENCOUNTER — TELEPHONE (OUTPATIENT)
Dept: PHYSICAL MEDICINE AND REHAB | Age: 34
End: 2019-04-26

## 2019-04-26 NOTE — TELEPHONE ENCOUNTER
Patient called the office requesting to return to work on Monday 4/29/19, and said that Dr Roya Valente wanted her to wait until after she has the neuropsych testing done. However, her appointment with neuropsych is scheduled for this afternoon. I informed the patient that Dr Roya Valente will not receive the neuropsych notes by Monday because those are very detailed reports that may take several days to complete. I also informed her that it may be up to the Neuropsych physician, to decide. Upon looking at the previous letter on 3/26/19, as well as the [facility] paperwork submitted on 4/1/19, the patient was to be off work until today 4/26/19. I placed the patient on hold and asked Dr Roya Valente about this. Per Dr Roya Valente,  I informed the patient that we need to know what the Neuropsych physician says because her last Impact testing was severely impaired on a cognitive standpoint. Dr Roya Valente is not comfortable with making that decision until she knows that the Neuropsych physician is ok with her returning to work. The patient voiced understanding and said that she would need an extension on her paperwork if she is unable to return.  I told her that she may need to provide our office with another form, and to discuss with the neuropsych physician first.

## 2019-04-26 NOTE — TELEPHONE ENCOUNTER
Patient called and left me a voicemail on my line to call her. Called and left message on patient voicemail that I was returning her call and to call office back. Will wait for return call from patient.

## 2019-04-29 ENCOUNTER — HOSPITAL ENCOUNTER (OUTPATIENT)
Dept: PHYSICAL THERAPY | Age: 34
Setting detail: THERAPIES SERIES
Discharge: HOME | End: 2019-04-29
Payer: COMMERCIAL

## 2019-04-29 NOTE — TELEPHONE ENCOUNTER
Patient called back and said she had her Neuropsych appointment on Friday 4/26/19. The physician was uncomfortable with releasing her to work at this time. She has another appointment with them on 6/6/19, and pending appointment with us on 6/20/19. We will need to extend her work leave. Patient said that we can use the same form as before, just change the dates. What date should she be expected to return to work?

## 2019-04-29 NOTE — TELEPHONE ENCOUNTER
Called and left patient a message that I will print out form and change dates but they might not accept it as it is very small. Dates were changed to RTW 7/1/19. I did ask pt to call to have [employer] us another form if this is not sufficient.
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
## 2019-04-29 NOTE — TELEPHONE ENCOUNTER
Patient called again regarding a work release and [facility] paperwork. She restated her conversation with [facility] and that Brenton Geronimo is not comfortable with releasing her to go back to work. She would like us to fill out [facility] paperwork again and is going to have that faxed to us. She also would like us to fax another letter to her boss [employer]. She stated that she went to the [facility] and that they told her they are not going to have her results for a few weeks (2-3). Her next appointment with neuropsych is June 6th and her next appointment with us is June 20th. Please advise.

## 2019-06-20 ENCOUNTER — OFFICE VISIT (OUTPATIENT)
Dept: PHYSICAL MEDICINE AND REHAB | Age: 34
End: 2019-06-20
Payer: COMMERCIAL

## 2019-06-20 VITALS
BODY MASS INDEX: 43.32 KG/M2 | DIASTOLIC BLOOD PRESSURE: 78 MMHG | SYSTOLIC BLOOD PRESSURE: 132 MMHG | HEIGHT: 65 IN | WEIGHT: 260 LBS | HEART RATE: 112 BPM

## 2019-06-20 DIAGNOSIS — F07.81 POST-CONCUSSION VERTIGO: ICD-10-CM

## 2019-06-20 DIAGNOSIS — R42 POST-CONCUSSION VERTIGO: ICD-10-CM

## 2019-06-20 DIAGNOSIS — G47.01 INSOMNIA DUE TO MEDICAL CONDITION: ICD-10-CM

## 2019-06-20 DIAGNOSIS — S06.0X1S CONCUSSION WITH LOSS OF CONSCIOUSNESS OF 30 MINUTES OR LESS, SEQUELA (HCC): ICD-10-CM

## 2019-06-20 DIAGNOSIS — F43.23 ADJUSTMENT DISORDER WITH MIXED ANXIETY AND DEPRESSED MOOD: Primary | ICD-10-CM

## 2019-06-20 PROCEDURE — 1036F TOBACCO NON-USER: CPT | Performed by: PHYSICAL MEDICINE & REHABILITATION

## 2019-06-20 PROCEDURE — G8417 CALC BMI ABV UP PARAM F/U: HCPCS | Performed by: PHYSICAL MEDICINE & REHABILITATION

## 2019-06-20 PROCEDURE — 96132 NRPSYC TST EVAL PHYS/QHP 1ST: CPT | Performed by: PHYSICAL MEDICINE & REHABILITATION

## 2019-06-20 PROCEDURE — 99214 OFFICE O/P EST MOD 30 MIN: CPT | Performed by: PHYSICAL MEDICINE & REHABILITATION

## 2019-06-20 PROCEDURE — G8427 DOCREV CUR MEDS BY ELIG CLIN: HCPCS | Performed by: PHYSICAL MEDICINE & REHABILITATION

## 2019-06-20 RX ORDER — AMITRIPTYLINE HYDROCHLORIDE 50 MG/1
50 TABLET, FILM COATED ORAL NIGHTLY
Qty: 90 TABLET | Refills: 0 | Status: SHIPPED | OUTPATIENT
Start: 2019-06-20 | End: 2019-09-19 | Stop reason: SDUPTHER

## 2019-06-20 NOTE — PATIENT INSTRUCTIONS
Patient Education        Vertigo: Exercises  Your Care Instructions  Here are some examples of typical rehabilitation exercises for your condition. Start each exercise slowly. Ease off the exercise if you start to have pain. Your doctor or physical therapist will tell you when you can start these exercises and which ones will work best for you. How to do the exercises  Exercise 1    1. Stand with a chair in front of you and a wall behind you. If you begin to fall, you may use them for support. 2. Stand with your feet together and your arms at your sides. 3. Move your head up and down 10 times. Exercise 2    1. Move your head side to side 10 times. Exercise 3    1. Move your head diagonally up and down 10 times. Exercise 4    1. Move your head diagonally up and down 10 times on the other side. Follow-up care is a key part of your treatment and safety. Be sure to make and go to all appointments, and call your doctor if you are having problems. It's also a good idea to know your test results and keep a list of the medicines you take. Where can you learn more? Go to https://[employer]. org and sign in to your Anne Fogarty account. Enter F349 in the ePAR box to learn more about \"Vertigo: Exercises. \"     If you do not have an account, please click on the \"Sign Up Now\" link. Current as of: October 21, 2018  Content Version: 12.0  © 6032-0325 Healthwise, Incorporated. Care instructions adapted under license by [facility] ([facility]). If you have questions about a medical condition or this instruction, always ask your healthcare professional. [place] [address] any warranty or liability for your use of this information. Patient Education         Vertigo: Head Movements That Help (01:53)  Your health professional recommends that you watch this short online health video. Learn simple head movements to help with vertigo and balance problems.      How to watch the video    Scan the QR code   OR Visit the website    https://hwi. se/r/Ef2uyeoevzoyl   Current as of: October 21, 2018  Content Version: 12.0  © 9156-9225 Healthwise, Incorporated. Care instructions adapted under license by [facility] ([facility]). If you have questions about a medical condition or this instruction, always ask your healthcare professional. [address] any warranty or liability for your use of this information. Patient Education        Epley Maneuver at Home for Vertigo: Exercises  Your Care Instructions  Vertigo is a spinning or whirling sensation when you move your head. Your doctor may have moved you in different positions to help your vertigo get better faster. This is called the Epley maneuver. Your doctor also may have asked you to do these exercises at home. Do the exercises as often as your doctor recommends. If your vertigo is getting worse, your doctor may have you change the exercise or stop it. Step 1  Step 1    4. Sit on the edge of a bed or sofa. Step 2    2. Turn your head 45 degrees in the direction your doctor told you to. This should be toward the ear that causes the most vertigo for you. In this picture, the woman is turning toward her left ear. Step 3    2. Tilt yourself backward until you are lying on your back. Your head should still be at a 45-degree turn. Your head should be about midway between looking straight ahead and looking out to your side. Hold for 30 seconds. If you have vertigo, stay in this position until it stops. Step 4    2. Turn your head 90 degrees toward the ear that has the least vertigo. In this picture, the woman is turning to the right because she has vertigo on her left side. The point of your chin should be raised and over your shoulder. Hold for 30 seconds. Step 5    1. Roll onto the side with the least vertigo. You should now be looking at the floor. Hold for 30 seconds.     Follow-up care is a key part of your treatment and safety. Be sure to make and go to all appointments, and call your doctor if you are having problems. It's also a good idea to know your test results and keep a list of the medicines you take. Where can you learn more? Go to https://chpromiseeb.[employer]. org and sign in to your [employer] account. Enter M082 in the Zoomabet box to learn more about \"Epley Maneuver at Home for Vertigo: Exercises. \"     If you do not have an account, please click on the \"Sign Up Now\" link. Current as of: Valerie 3, 2018  Content Version: 12.0  © 5172-3712 Healthwise, Incorporated. Care instructions adapted under license by [facility] ([facility]). If you have questions about a medical condition or this instruction, always ask your healthcare professional. [address] any warranty or liability for your use of this information.

## 2019-06-20 NOTE — PROGRESS NOTES
Swati Hernandez D.O. [facility]  [address]  Phone: [phone]  Fax: [phone]        6/20/19    Chief Complaint   Patient presents with    Concussion     Impact Testing       HPI:  Rea Fitzpatrick is a 34y.o. year old woman seen today in follow up regarding concussion. Interval history: Since the last visit the patient  Has continued to have difficulty with balance, ringing in right ear, memory loss. She saw ENT they noted mild hearing loss which does not need correction, and recommended MRI which she declined. Had neuropsych testing with Dr. Darrel Thomas, reviewed her recommendations and she recommends psychology followed by speech therapy. Today, the pain is rated Pain Score:   7 where 0 is no pain and 10 is pain as bad as it can be. The pain is located in the right frontal region,  does not radiate, and is described as pressure. This pain occurs intermittently. The symptoms have been better since onset. Symptoms are exacerbated by lights and sounds. Factors which relieve the pain include closing eyes, not moving. Otherwise, the pain assessment has not changed since the last visit. Review of systems    Headache Yes   Nausea  Yes   Vomiting Yes   Balance problems Yes   Dizziness Yes   Fatigue Yes   Trouble falling asleep Yes   Sleeping more than usual Yes   Sleeping less than usual Yes   drowsiness Yes   Sensitivity to light Yes   Sensitivity to noise Yes   Irritability Yes   Sadness Yes   Nervousness Yes   Feeling more emotional Yes   Numbness or tingling Yes   Feeling slowed down Yes   Feeling mentally foggy Yes   Difficulty concentrating Yes   Difficulty remembering Yes   Visual problems Yes       History reviewed. No pertinent past medical history.     Past Surgical History:   Procedure Laterality Date    APPENDECTOMY      LAPAROSCOPY         Social History     Tobacco Use    Smoking status: Never Smoker    Smokeless tobacco: reproduction of headache at greater occipital nerve. ROM is full and painless in the spine and extremities. Neurologic:  Cognitive exam: Awake, alert and oriented in three planes. Speech is fluent. Reasoning is abstract. Judgement, planning and problem solving are intact. Attention is intact. Immediate recall is 3/3. Delayed recall is 3/3. Calculations are intact. Cranial nerves: No facial weakness or sensory loss. Tongue is midline. Palate elevates symmetrically. Hearing is intact for conversation. Pupils are equal and round. Extraocular muscles are intact. Shoulder shrug is symmetric. Sensation: Intact for light touch and pin prick in all upper and lower extremity dermatomes. Strength: 5/5 in all myotomes in the upper and lower extremities. Muscle tendon reflexes were 2+ and symmetric in the upper and lower extremities. There is no hyperalgesia or allodynia. Babinski is downgoing bilaterally. Hessie Seek is negative bilaterally. Coordination in the upper and lower extremities is normal. Gait is Normal.  No clonus or spasticity. The patient was able to rise from a chair and squat without difficulty. There is no tremor. Vestibular examination: Nystagmus: No.  Smooth pursuits on EOM testing. No abnormal saccades on vertical and horizontal testing. Convergence  is <6 cm normal. No blurring or double vision with testing of VOR horizontally and vertically. Balance exam: Romberg: positive. Impression:   1. Adjustment disorder with mixed anxiety and depressed mood    2. Concussion with loss of consciousness of 30 minutes or less, sequela ([facility] [address]    3. Post-concussion vertigo    4.  Insomnia due to medical condition        Plan:  Orders Placed This Encounter   Procedures    External Referral To Psychology     Referral Priority:   Routine     Referral Type:   Eval and Treat     Referral Reason:   Specialty Services Required     Requested Specialty:   Psychology     Number of Visits Requested:   1   Plan for

## 2019-09-19 ENCOUNTER — OFFICE VISIT (OUTPATIENT)
Dept: PHYSICAL MEDICINE AND REHAB | Age: 34
End: 2019-09-19
Payer: COMMERCIAL

## 2019-09-19 VITALS
SYSTOLIC BLOOD PRESSURE: 124 MMHG | HEART RATE: 100 BPM | DIASTOLIC BLOOD PRESSURE: 80 MMHG | WEIGHT: 258 LBS | HEIGHT: 65 IN | BODY MASS INDEX: 42.99 KG/M2

## 2019-09-19 DIAGNOSIS — R42 POST-CONCUSSION VERTIGO: ICD-10-CM

## 2019-09-19 DIAGNOSIS — G47.01 INSOMNIA DUE TO MEDICAL CONDITION: ICD-10-CM

## 2019-09-19 DIAGNOSIS — F07.81 POST-CONCUSSION VERTIGO: ICD-10-CM

## 2019-09-19 DIAGNOSIS — S06.0X1S CONCUSSION WITH LOSS OF CONSCIOUSNESS OF 30 MINUTES OR LESS, SEQUELA (HCC): Primary | ICD-10-CM

## 2019-09-19 DIAGNOSIS — G44.311 INTRACTABLE ACUTE POST-TRAUMATIC HEADACHE: ICD-10-CM

## 2019-09-19 PROCEDURE — G8427 DOCREV CUR MEDS BY ELIG CLIN: HCPCS | Performed by: PHYSICAL MEDICINE & REHABILITATION

## 2019-09-19 PROCEDURE — G8417 CALC BMI ABV UP PARAM F/U: HCPCS | Performed by: PHYSICAL MEDICINE & REHABILITATION

## 2019-09-19 PROCEDURE — 1036F TOBACCO NON-USER: CPT | Performed by: PHYSICAL MEDICINE & REHABILITATION

## 2019-09-19 PROCEDURE — 96132 NRPSYC TST EVAL PHYS/QHP 1ST: CPT | Performed by: PHYSICAL MEDICINE & REHABILITATION

## 2019-09-19 PROCEDURE — 99214 OFFICE O/P EST MOD 30 MIN: CPT | Performed by: PHYSICAL MEDICINE & REHABILITATION

## 2019-09-19 RX ORDER — AMITRIPTYLINE HYDROCHLORIDE 50 MG/1
50 TABLET, FILM COATED ORAL NIGHTLY
Qty: 90 TABLET | Refills: 1 | Status: SHIPPED | OUTPATIENT
Start: 2019-09-19 | End: 2019-12-19 | Stop reason: DRUGHIGH

## 2019-09-19 RX ORDER — RANITIDINE 150 MG/1
150 TABLET ORAL 2 TIMES DAILY
COMMUNITY
End: 2021-12-21

## 2019-12-19 ENCOUNTER — OFFICE VISIT (OUTPATIENT)
Dept: PHYSICAL MEDICINE AND REHAB | Age: 34
End: 2019-12-19
Payer: COMMERCIAL

## 2019-12-19 VITALS
HEIGHT: 65 IN | SYSTOLIC BLOOD PRESSURE: 126 MMHG | DIASTOLIC BLOOD PRESSURE: 84 MMHG | HEART RATE: 99 BPM | WEIGHT: 263 LBS | BODY MASS INDEX: 43.82 KG/M2

## 2019-12-19 DIAGNOSIS — R42 POST-CONCUSSION VERTIGO: ICD-10-CM

## 2019-12-19 DIAGNOSIS — S06.0X1S CONCUSSION WITH LOSS OF CONSCIOUSNESS OF 30 MINUTES OR LESS, SEQUELA (HCC): Primary | ICD-10-CM

## 2019-12-19 DIAGNOSIS — F07.81 POST-CONCUSSION VERTIGO: ICD-10-CM

## 2019-12-19 DIAGNOSIS — G47.01 INSOMNIA DUE TO MEDICAL CONDITION: ICD-10-CM

## 2019-12-19 DIAGNOSIS — G44.311 INTRACTABLE ACUTE POST-TRAUMATIC HEADACHE: ICD-10-CM

## 2019-12-19 PROCEDURE — 1036F TOBACCO NON-USER: CPT | Performed by: PHYSICAL MEDICINE & REHABILITATION

## 2019-12-19 PROCEDURE — G8417 CALC BMI ABV UP PARAM F/U: HCPCS | Performed by: PHYSICAL MEDICINE & REHABILITATION

## 2019-12-19 PROCEDURE — G8427 DOCREV CUR MEDS BY ELIG CLIN: HCPCS | Performed by: PHYSICAL MEDICINE & REHABILITATION

## 2019-12-19 PROCEDURE — 99213 OFFICE O/P EST LOW 20 MIN: CPT | Performed by: PHYSICAL MEDICINE & REHABILITATION

## 2019-12-19 PROCEDURE — G8484 FLU IMMUNIZE NO ADMIN: HCPCS | Performed by: PHYSICAL MEDICINE & REHABILITATION

## 2019-12-19 RX ORDER — CYCLOBENZAPRINE HCL 10 MG
TABLET ORAL
COMMUNITY
Start: 2019-10-17 | End: 2021-12-21

## 2019-12-19 RX ORDER — METFORMIN HYDROCHLORIDE 500 MG/1
500 TABLET, EXTENDED RELEASE ORAL DAILY
COMMUNITY
Start: 2019-12-10 | End: 2021-12-21

## 2019-12-19 RX ORDER — AMITRIPTYLINE HYDROCHLORIDE 75 MG/1
75 TABLET, FILM COATED ORAL NIGHTLY
Qty: 30 TABLET | Refills: 2 | Status: SHIPPED
Start: 2019-12-19 | End: 2020-03-20 | Stop reason: SDUPTHER

## 2019-12-19 RX ORDER — ERGOCALCIFEROL 1.25 MG/1
CAPSULE ORAL
COMMUNITY
Start: 2019-10-17 | End: 2022-04-25 | Stop reason: ALTCHOICE

## 2020-03-19 ENCOUNTER — OFFICE VISIT (OUTPATIENT)
Dept: PHYSICAL MEDICINE AND REHAB | Age: 35
End: 2020-03-19
Payer: COMMERCIAL

## 2020-03-19 VITALS
SYSTOLIC BLOOD PRESSURE: 134 MMHG | HEART RATE: 99 BPM | DIASTOLIC BLOOD PRESSURE: 82 MMHG | HEIGHT: 65 IN | WEIGHT: 274 LBS | BODY MASS INDEX: 45.65 KG/M2

## 2020-03-19 PROCEDURE — G8417 CALC BMI ABV UP PARAM F/U: HCPCS | Performed by: PHYSICAL MEDICINE & REHABILITATION

## 2020-03-19 PROCEDURE — G8427 DOCREV CUR MEDS BY ELIG CLIN: HCPCS | Performed by: PHYSICAL MEDICINE & REHABILITATION

## 2020-03-19 PROCEDURE — 99214 OFFICE O/P EST MOD 30 MIN: CPT | Performed by: PHYSICAL MEDICINE & REHABILITATION

## 2020-03-19 PROCEDURE — 1036F TOBACCO NON-USER: CPT | Performed by: PHYSICAL MEDICINE & REHABILITATION

## 2020-03-19 PROCEDURE — 96132 NRPSYC TST EVAL PHYS/QHP 1ST: CPT | Performed by: PHYSICAL MEDICINE & REHABILITATION

## 2020-03-19 PROCEDURE — G8484 FLU IMMUNIZE NO ADMIN: HCPCS | Performed by: PHYSICAL MEDICINE & REHABILITATION

## 2020-03-19 RX ORDER — GABAPENTIN 300 MG/1
600 CAPSULE ORAL 3 TIMES DAILY
COMMUNITY
Start: 2020-03-11 | End: 2021-03-23

## 2020-03-19 NOTE — PROGRESS NOTES
Topics    Alcohol use: No    Drug use: No       History reviewed. No pertinent family history. Current Outpatient Medications   Medication Sig Dispense Refill    gabapentin (NEURONTIN) 300 MG capsule Take 300 mg by mouth 3 times daily. Currently titrating up to TID      cyclobenzaprine (FLEXERIL) 10 MG tablet TK 1 T PO TID FOR 14 DAYS PRN      vitamin D (ERGOCALCIFEROL) 1.25 MG (22800 UT) CAPS capsule TK 1 C PO Q WK      metFORMIN (GLUCOPHAGE-XR) 500 MG extended release tablet Take 500 mg by mouth daily      amitriptyline (ELAVIL) 75 MG tablet Take 1 tablet by mouth nightly 30 tablet 2    ranitidine (ZANTAC) 150 MG tablet Take 150 mg by mouth 2 times daily      ondansetron (ZOFRAN) 4 MG tablet Take 4 mg by mouth every 8 hours as needed for Nausea or Vomiting      meclizine (ANTIVERT) 25 MG tablet Take 25 mg by mouth 3 times daily as needed      COMFORT GEL ANTACID ANTI--400-40 MG/5ML SUSP Take 10 mLs by mouth every 4-6 hours as needed  2    acetaminophen (TYLENOL) 325 MG tablet Take 650 mg by mouth every 6 hours as needed for Pain       No current facility-administered medications for this visit. Allergies   Allergen Reactions    Lidocaine Anaphylaxis     Patient was given Lidocaine at the dentist office and became unresponsive and was taken to the ER    Bactrim [Sulfamethoxazole-Trimethoprim] Rash    Procaine Hcl     Quaternium-15 Hives       Review of Systems:  No new weakness, paresthesia, incontinence of bowel or bladder, saddle anesthesia, falls or gait dysfunction. Otherwise, per HPI. Physical Exam:   Blood pressure 134/82, pulse 99, height 5' 5\" (1.651 m), weight 274 lb (124.3 kg), not currently breastfeeding. GENERAL: The patient is in no apparent distress. Body habitus is obese. Musculoskeletal: No tenderness to palpation at SCM, trapezius, cervical paraspinals. No evidence of any trigger points. No reproduction of headache at greater occipital nerve.   ROM is full Requested:   1     IMPACT TESTING: I have reviewed the post injury IMPACT testing reports. The complete IMPACT report is attached to this encounter. A baseline was not available for comparison so percentile scores were used for analysis. Compared to normal values and academic status all domains remain significantly impaired and there has been no improvement from prior visit or from the first post injury testing. Symptom score is also relatively stable at 120     Remain off work at this time. The patient was educated about the diagnosis, prognosis, indications, risks and benefits of treatment. An opportunity to ask questions was given to the patient and questions were answered. The patient agreed to proceed with the recommended treatment as described above. Follow up 3 months  Thank you for allowing me to participate in the care of your patient. Joanie Jung D.O., P.T.   Board Certified Physical Medicine and Rehabilitation  Board Certified Electrodiagnostic Medicine

## 2020-03-20 RX ORDER — AMITRIPTYLINE HYDROCHLORIDE 75 MG/1
75 TABLET, FILM COATED ORAL NIGHTLY
Qty: 30 TABLET | Refills: 2 | Status: SHIPPED
Start: 2020-03-20 | End: 2021-03-23

## 2020-03-20 RX ORDER — CYCLOBENZAPRINE HCL 10 MG
10 TABLET ORAL NIGHTLY PRN
Qty: 30 TABLET | Refills: 0 | Status: SHIPPED
Start: 2020-03-20 | End: 2020-06-11 | Stop reason: SDUPTHER

## 2020-06-08 ENCOUNTER — HOSPITAL ENCOUNTER (OUTPATIENT)
Dept: PHYSICAL THERAPY | Age: 35
Setting detail: THERAPIES SERIES
Discharge: HOME | End: 2020-06-08
Payer: COMMERCIAL
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
## 2020-06-11 ENCOUNTER — OFFICE VISIT (OUTPATIENT)
Dept: PHYSICAL MEDICINE AND REHAB | Age: 35
End: 2020-06-11
Payer: COMMERCIAL

## 2020-06-11 VITALS
WEIGHT: 274 LBS | HEIGHT: 65 IN | SYSTOLIC BLOOD PRESSURE: 135 MMHG | BODY MASS INDEX: 45.65 KG/M2 | HEART RATE: 98 BPM | DIASTOLIC BLOOD PRESSURE: 87 MMHG

## 2020-06-11 PROCEDURE — 1036F TOBACCO NON-USER: CPT | Performed by: PHYSICAL MEDICINE & REHABILITATION

## 2020-06-11 PROCEDURE — G8417 CALC BMI ABV UP PARAM F/U: HCPCS | Performed by: PHYSICAL MEDICINE & REHABILITATION

## 2020-06-11 PROCEDURE — 99214 OFFICE O/P EST MOD 30 MIN: CPT | Performed by: PHYSICAL MEDICINE & REHABILITATION

## 2020-06-11 PROCEDURE — G8427 DOCREV CUR MEDS BY ELIG CLIN: HCPCS | Performed by: PHYSICAL MEDICINE & REHABILITATION

## 2020-06-11 PROCEDURE — 96132 NRPSYC TST EVAL PHYS/QHP 1ST: CPT | Performed by: PHYSICAL MEDICINE & REHABILITATION

## 2020-06-11 RX ORDER — AMITRIPTYLINE HYDROCHLORIDE 75 MG/1
75 TABLET, FILM COATED ORAL NIGHTLY
Qty: 30 TABLET | Refills: 2 | Status: CANCELLED | OUTPATIENT
Start: 2020-06-11

## 2020-06-11 RX ORDER — SUVOREXANT 5 MG/1
5 TABLET, FILM COATED ORAL NIGHTLY PRN
Qty: 30 TABLET | Refills: 2 | Status: SHIPPED | OUTPATIENT
Start: 2020-06-11 | End: 2020-07-11

## 2020-06-11 RX ORDER — CYCLOBENZAPRINE HCL 10 MG
10 TABLET ORAL NIGHTLY PRN
Qty: 30 TABLET | Refills: 0 | Status: SHIPPED | OUTPATIENT
Start: 2020-06-11 | End: 2020-06-21

## 2020-06-11 NOTE — PROGRESS NOTES
Substance Use Topics    Alcohol use: No    Drug use: No       History reviewed. No pertinent family history. Current Outpatient Medications   Medication Sig Dispense Refill    cyclobenzaprine (FLEXERIL) 10 MG tablet Take 1 tablet by mouth nightly as needed for Muscle spasms 30 tablet 0    Suvorexant (BELSOMRA) 5 MG TABS Take 5 mg by mouth nightly as needed (sleep) for up to 30 days. 30 tablet 2    amitriptyline (ELAVIL) 75 MG tablet Take 1 tablet by mouth nightly 30 tablet 2    gabapentin (NEURONTIN) 300 MG capsule Take 300 mg by mouth 3 times daily. Currently titrating up to TID      cyclobenzaprine (FLEXERIL) 10 MG tablet TK 1 T PO TID FOR 14 DAYS PRN      vitamin D (ERGOCALCIFEROL) 1.25 MG (90569 UT) CAPS capsule TK 1 C PO Q WK      metFORMIN (GLUCOPHAGE-XR) 500 MG extended release tablet Take 500 mg by mouth daily      ranitidine (ZANTAC) 150 MG tablet Take 150 mg by mouth 2 times daily      ondansetron (ZOFRAN) 4 MG tablet Take 4 mg by mouth every 8 hours as needed for Nausea or Vomiting      meclizine (ANTIVERT) 25 MG tablet Take 25 mg by mouth 3 times daily as needed      COMFORT GEL ANTACID ANTI--400-40 MG/5ML SUSP Take 10 mLs by mouth every 4-6 hours as needed  2    acetaminophen (TYLENOL) 325 MG tablet Take 650 mg by mouth every 6 hours as needed for Pain       No current facility-administered medications for this visit. Allergies   Allergen Reactions    Lidocaine Anaphylaxis     Patient was given Lidocaine at the dentist office and became unresponsive and was taken to the ER    Bactrim [Sulfamethoxazole-Trimethoprim] Rash    Procaine Hcl     Quaternium-15 Hives       Review of Systems:  No new weakness, paresthesia, incontinence of bowel or bladder, saddle anesthesia, falls or gait dysfunction. Otherwise, per HPI. Physical Exam:   Blood pressure 135/87, pulse 98, height 5' 5\" (1.651 m), weight 274 lb (124.3 kg), not currently breastfeeding.   GENERAL: The patient is

## 2020-06-12 ENCOUNTER — TELEPHONE (OUTPATIENT)
Dept: PHYSICAL MEDICINE AND REHAB | Age: 35
End: 2020-06-12

## 2020-06-12 NOTE — TELEPHONE ENCOUNTER
Dr. Aleida Lee reviewed the consult notes form [facility]. Per her response, \"Please let patient know they were sending her four places: vestibular therapy, psychotherapy, cognitive therapy and tinnitus clinic. She wasn't sure what she was supposed to do based on their consult but that was what the doctors recommendations there were. \"    This information was already listed on the patient's after visit summary from the concussion clinic that she brought to her appointment yesterday. I discussed this with Dr. Aleida Lee and asked if the patient needs to schedule a follow up with [facility]. Per Dr. Aleida Lee, need to find out if [facility] concussion clinic will be seeing the patient again. If so, we don't need to see the patient. I called the patient and discussed this with her. She voiced understanding and also said she was told to follow up with [facility] after she goes for the recommended referrals. I told the patient that she didn't need to follow up with Dr. Aleida Lee at this time, unless Dr. Dominga Allen tells her to do so.

## 2020-06-15 ENCOUNTER — HOSPITAL ENCOUNTER (OUTPATIENT)
Dept: PHYSICAL THERAPY | Age: 35
Setting detail: THERAPIES SERIES
Discharge: HOME | End: 2020-06-15
Payer: COMMERCIAL

## 2020-06-15 PROCEDURE — 97110 THERAPEUTIC EXERCISES: CPT

## 2020-06-15 NOTE — PROGRESS NOTES
[facility]                Phone: [phone]   Fax: [phone]    Physical Therapy Daily Treatment Note  Date:  6/15/2020    Patient Name:  Pedro Grady    :  1985  MRN: [id number]      Referring Physician:  Miriam Shah  Insurance Information:  Mimi Sender MEDICAID      Evaluation date:  20  Diagnosis:  Low back pain (chronic)  Evaluating Physical Therapist:  Rashmi Segovia DPT   Visit:       [facility] RE-ASSESSMENT FORM      Check of Management Strategies:     Compliance / Commitment  [] Excellent   [x] Good   [] Fair   [] Poor    Posture Correction: []Yes   [x] No    Performing Exercises:  [x]Yes   [] No    Frequency: [x] Appropriate  Pt reports every 3 hours  [] Not appropriate                        Symptom Response during  exercises:  [] Increase   [] Decrease   [x] No  effect     Technique: [x] Good  [] Needs correcting    Comments:  Pt reports 9/10 low back and LLE pain prior to session today  States that pain is \"the same\" since eval     Symptomatic Presentation:    Pain Location: [] Centralised     [x] Same    [] Peripheralized               Description:      Frequency: []Better    [x]Same   []Worse    Severity: 9/10         Functional Status:  % improvement since initial assessment: 0 %    Exercises:     Exercise   During/ After  Comments    Prone press-ups  2x10  I, NW            Seated flexion  2x10 I, W Worse low back and LLE per pt             R side glides on wall  1x10  I, W Worse LLE pain per pt            L side glides on wall  2x10  I, NW  No change in symptoms                                              HEP  Prone press-ups         Mechanical Presentation:    Sitting Posture: []Good   [x]Fair  []Poor                  Standing Posture:  []Good   [x]Fair  []Poor      Deformity: [] Yes    [x] No   [] Not applicable                  Neurological Testing:  [] Better    [x] Same   [] Worse    [] NA     Movement

## 2020-06-16 ENCOUNTER — TELEPHONE (OUTPATIENT)
Dept: PHYSICAL MEDICINE AND REHAB | Age: 35
End: 2020-06-16

## 2020-06-17 ENCOUNTER — TELEPHONE (OUTPATIENT)
Dept: PHYSICAL MEDICINE AND REHAB | Age: 35
End: 2020-06-17

## 2020-06-23 ENCOUNTER — HOSPITAL ENCOUNTER (OUTPATIENT)
Dept: PHYSICAL THERAPY | Age: 35
Setting detail: THERAPIES SERIES
Discharge: HOME | End: 2020-06-23
Payer: COMMERCIAL

## 2020-06-23 NOTE — PROGRESS NOTES
[address] [facility]                Phone: [phone]   Fax: [phone]    Physical Therapy Daily Treatment Note  Date:  2020    Patient Name:  Óscar Garcia    :  1985  MRN: [id number]      Referring Physician:  Amber Begum  Insurance Information:  Malu Sanchez MEDICAID      Evaluation date:  20  Diagnosis:  Low back pain (chronic)  Evaluating Physical Therapist:  Joyce Rudolph DPT   Visit: 3/4-12      [facility] RE-ASSESSMENT FORM      Check of Management Strategies:     Compliance / Commitment  [] Excellent   [] Good   [x] Fair   [] Poor    Posture Correction: []Yes   [x] No    Performing Exercises:  [x]Yes   [] No    Frequency: [] Appropriate   [x] Not appropriate   Pt reports that she did not perform her exercises for a few days since last visit. Symptom Response during  exercises:  [] Increase   [] Decrease   [x] No  effect     Technique: [x] Good  [] Needs correcting    Comments:  Pt reports that she has not been fully compliant with HEP (went 2 days without performing HEP)  States that medications were changed and she is not sleeping well.   Pt states that low back pain is \"10/10\" currently despite not being in severe distress  Pt is able to carry on a normal conversation with PT without wincing, moaning, or other outward signs of severe distress      Symptomatic Presentation:    Pain Location: [] Centralised     [x] Same    [] Peripheralized               Description:      Frequency: []Better    [x]Same   []Worse    Severity: 10/10         Functional Status:  % improvement since initial assessment: 0 %    Exercises:     Exercise   During/ After  Comments    L side glides on wall  2x10  I, NW  No change in symptoms           HEP  Prone press-ups         Mechanical Presentation:    Sitting Posture: []Good   [x]Fair  []Poor                  Standing Posture:  []Good   [x]Fair  []Poor      Deformity: [] Yes    [x] No [] Not applicable                  Neurological Testing:  [] Better    [x] Same   [] Worse    [] NA     Movement Loss: [] Better    [x] Same   [] Worse      Repeated Movements:   [] Better    [x] Same   [] Worse          SUMMARY: [] Better    [x] Same   [] Worse                    Classification Confirmed   []  Yes     [x] No    Comments:  Pt continues to have no directional preference with repeated movements  No specific movements make her pain either better or worse  She has not responded to repeated flexion, extension, R side glides, L side glides  Question effort given at times with muscle testing due to giving way   Continues to ambulate with antalgic gait pattern on LLE    Discussed lack of change in symptoms at length with pt today. She may benefit from MRI at this time due to lack of change in symptoms with PT  Essentially no change in any symptoms thus far with PT.       Revised Classification (if appropriate):    [] Derangement   [] Dysfunction   [] Posture           [x] OTHER (subgroup)    Management Today:   [] Posture      [x] HEP instruction     [x] Exercise       Plan for next session:     Pt to call physician to discuss possible follow-up and MRI due to lack of progress in PT  Pt will call therapist back on 6/24/20 with update       Barriers to Recovery:    Compliance with HEP       CPT codes 6/23/2020 Units    Low Complexity PT evaluation 35432 63299    Moderate Complexity PT evaluation  39798    High Complexity PT evaluation 53526    PT Re-evaluation  12688    Gait training 38366    Manual therapy  [phone]    Therapeutic activities  96771    Therapeutic exercises  26310 1   Neuromuscular reeducation  64446                                                                                                                                                                      Time In:  6710  Time Out:  [address] [place], [place]  [id number]

## 2020-09-01 RX ORDER — AMITRIPTYLINE HYDROCHLORIDE 75 MG/1
TABLET, FILM COATED ORAL
Qty: 30 TABLET | Refills: 2 | OUTPATIENT
Start: 2020-09-01

## 2020-09-16 ENCOUNTER — EVALUATION (OUTPATIENT)
Dept: PHYSICAL THERAPY | Age: 35
End: 2020-09-16
Payer: COMMERCIAL

## 2020-09-16 PROCEDURE — 97161 PT EVAL LOW COMPLEX 20 MIN: CPT | Performed by: PHYSICAL THERAPIST

## 2020-09-16 PROCEDURE — 97112 NEUROMUSCULAR REEDUCATION: CPT | Performed by: PHYSICAL THERAPIST
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
## 2020-09-16 NOTE — PROGRESS NOTES
Physical Therapy Daily Treatment Note    Date: 2020  Patient Name: Elizabeth Escalona  : 1985   MRN: <[id number]>  DOInjury: 2018  DOSx: -  Referring Provider: No referring provider defined for this encounter. Reny ROBBINS                    Medical Diagnosis:    Diagnosis Orders   1. Concussion with loss of consciousness of 30 minutes or less, initial encounter     2. Post-concussion vertigo       Outcome Measure:  Lower Extremity Functional Scale (LEFS) 84% impairment    S: See eval  O:  Time 3672-6239     Visit 1     Pain 5/10 Left lower extremity     ROM      Manual maneuvers      Julian Hallpike R                      L   negative    negative     Epley      Exercise      Nustep        Sit/Stands 4/30 sec Use of upper extremities     TUG test 21 sec     Gait training 2 x 100 feet Antalgic gain Left lower extremity        NMR Balance activities to aid in safe community and home ambulation    VOR                        vertical                              horizontal  x 10   x 10     Saccades  x 10     Smooth Pursuit  x 30 sec     ABCD      Ball pass eyes ball               Eyes outside      Diagonal ball chops            Marching Gait      Sidestepping      Gait with head turns f/e                                  Rot                                  LF       Ball press up squat eyes out   eyes on ball      90* turn step up                  A:  Tolerated well. Above added to written HEP.   P: Continue with rehab plan  Fonda Galeazzi, PT    Treatment Charges: Mins Units   Initial Evaluation 30 1   Re-Evaluation     Ther Exercise         TE     Manual Therapy     MT     Ther Activities        TA     Gait Training          GT     Neuro Re-education NR 10 1   Modalities     Non-Billable Service Time 5    Other     Total Time/Units 45 2

## 2020-09-18 ENCOUNTER — HOSPITAL ENCOUNTER (OUTPATIENT)
Dept: PHYSICAL THERAPY | Age: 35
Setting detail: THERAPIES SERIES
Discharge: HOME | End: 2020-09-18
Payer: COMMERCIAL

## 2020-09-18 NOTE — PROGRESS NOTES
[address] [facility]                Phone: [phone]   Fax: [phone]      Physical Therapy  Discharge     DATE:   9/18/2020            MRN: [id number]     PATIENT NAME:  Sweetie Hopson              Diagnosis:  Low back pain (chronic)    Total Visits to Date: 3  Cancels/No shows to Date: 0    Dear Dr. Moshe Greogry    This is to notify you that per our physical therapy department policy your patient, noted above, is being discharged from Physical Therapy due to the following reason(s):     · If a Physical Therapy out patient is absent from three consecutive scheduled appointments without notification    · If a Physical Therapy out patient is absent for 50% of the scheduled visits     · Pt's last contact with PT department was on 6/23/20. She did not show any improvement in low back pain with PT. Per chart, pt is active with physical therapy now at [facility]. If you have any questions, please feel free to contact me at [phone].     Thank You,    Electronically Signed by:   Masoud Farr DPT        [id number]  9/18/2020

## 2020-09-21 ENCOUNTER — TREATMENT (OUTPATIENT)
Dept: PHYSICAL THERAPY | Age: 35
End: 2020-09-21
Payer: COMMERCIAL

## 2020-09-21 PROCEDURE — 97112 NEUROMUSCULAR REEDUCATION: CPT

## 2020-09-21 NOTE — PROGRESS NOTES
Physical Therapy Daily Treatment Note    Date: 2020  Patient Name: Yariel Patton  : 1985   MRN: <[id number]>  DOInjury: 2018  DOSx: -  Referring Provider:  Ana Coley                    Medical Diagnosis:    Diagnosis Orders   1. Concussion with loss of consciousness of 30 minutes or less, initial encounter     2. Post-concussion vertigo       Outcome Measure:  Lower Extremity Functional Scale (LEFS) 84% impairment    S: pt reports falling on , just lost balance fell and hit your hands. O:  Time 5968-4308     Visit 2     Pain 5/10 Left lower extremity     ROM      Manual maneuvers      Julian Hallpike R                      L   negative    negative     Epley      Exercise      Nustep        Sit/Stands 4/30 sec Use of upper extremities     TUG test      Gait training 2 x 100 feet Antalgic gain Left lower extremity        NMR Balance activities to aid in safe community and home ambulation    VOR                        vertical                              horizontal  x 10   x 10     Saccades  x 10     Smooth Pursuit  x 30 sec     ABCD      Ball pass over head down                7x Mod dizzy, had to sit    Diagonal ball chops      Nose to knee 5x     Marching Gait      Sidestepping      Gait with head turns f/e                                  Rot                                  LF       Ball press up squat eyes out   eyes on ball      90* turn step up                  A:  Tolerated fair. Above added to written HEP. Very unsteady and dizzy with all activities. Scale 0-5 intensity it was 4-5/5. Due to unsteady recommend pt to use walker. Stopped using it for about a month. Previous therapist recommended her in the past to stop, but now feels condition is worse and reaching for environmental support.   P: Continue with rehab plan to improve balance and vertigo  Sonoita Sero, PTA    Treatment Charges: Mins Units   Initial Evaluation     Re-Evaluation     Ther Exercise         TE Manual Therapy     MT     Ther Activities        TA     Gait Training          GT     Neuro Re-education NR 45 3   Modalities     Non-Billable Service Time     Other     Total Time/Units 45 3

## 2020-09-23 ENCOUNTER — TREATMENT (OUTPATIENT)
Dept: PHYSICAL THERAPY | Age: 35
End: 2020-09-23
Payer: COMMERCIAL

## 2020-09-23 PROCEDURE — 97112 NEUROMUSCULAR REEDUCATION: CPT

## 2020-09-23 NOTE — PROGRESS NOTES
Physical Therapy Daily Treatment Note    Date: 2020  Patient Name: Esperanza Ware  : 1985   MRN: <[id number]>  DOInjury: 2018  DOSx: -  Referring Provider:  Crane Moment                    Medical Diagnosis:    Diagnosis Orders   1. Concussion with loss of consciousness of 30 minutes or less, initial encounter     2. Post-concussion vertigo       Outcome Measure:  Lower Extremity Functional Scale (LEFS) 84% impairment  DHI score:  100 pts     S: pt reports been going over to [facility] and dr is going to order a EMG. Increased the gabapentin. Can't remember things. O: amb into clinic with Rolator walker. Time 5336-7343     Visit 3     Pain 5/10 Left lower extremity     ROM      Manual maneuvers      Julian Hallpike R                      L   negative    negative     Epley      Exercise      Nustep        Sit/Stands 5/30 sec Use of upper extremities     TUG test      Gait training 2 x 100 feet Antalgic gain Left lower extremity        NMR Balance activities to aid in safe community and home ambulation    VOR                        vertical                              horizontal  x 10   x 10   Difficulty     Saccades  x 10     Smooth Pursuit  x 30 sec     ABCD      Ball pass over head down                7x Mod dizzy, had to sit  Scale 4.5    Diagonal ball chops      Nose to knee 10x  R/L Scale 4    Marching Gait      Sidestepping      Gait with head turns f/e                                  Rot                                  LF       Ball press up squat eyes out   eyes on ball      90* turn step up                  A:  Tolerated fair. Above added to written HEP. Very unsteady and dizzy with all activities. Scale 0-5 intensity it was 4-5/5. Pt unable to tolerate standing, sitting activities. Pt continues to have severe back/hip pain. When ambulating pt has increased difficulty picking up left leg. Continues with ringing in the ears. Going to see  tomorrow.   Recommend going back to .    P: Continue with rehab plan to improve balance and vertigo  Fani Rivero PTA    Treatment Charges: Mins Units   Initial Evaluation     Re-Evaluation     Ther Exercise         TE     Manual Therapy     MT     Ther Activities        TA     Gait Training          GT     Neuro Re-education NR 45 3   Modalities     Non-Billable Service Time     Other     Total Time/Units 45 3

## 2020-09-24 ENCOUNTER — EVALUATION (OUTPATIENT)
Dept: SPEECH THERAPY | Age: 35
End: 2020-09-24
Payer: COMMERCIAL

## 2020-09-24 ENCOUNTER — OFFICE VISIT (OUTPATIENT)
Dept: PHYSICAL MEDICINE AND REHAB | Age: 35
End: 2020-09-24
Payer: COMMERCIAL

## 2020-09-24 ENCOUNTER — TELEPHONE (OUTPATIENT)
Dept: PHYSICAL MEDICINE AND REHAB | Age: 35
End: 2020-09-24

## 2020-09-24 VITALS
RESPIRATION RATE: 20 BRPM | WEIGHT: 263.2 LBS | SYSTOLIC BLOOD PRESSURE: 135 MMHG | HEIGHT: 65 IN | BODY MASS INDEX: 43.85 KG/M2 | HEART RATE: 68 BPM | DIASTOLIC BLOOD PRESSURE: 98 MMHG

## 2020-09-24 PROCEDURE — G8427 DOCREV CUR MEDS BY ELIG CLIN: HCPCS | Performed by: PHYSICAL MEDICINE & REHABILITATION

## 2020-09-24 PROCEDURE — 96125 COGNITIVE TEST BY HC PRO: CPT | Performed by: SPEECH-LANGUAGE PATHOLOGIST

## 2020-09-24 PROCEDURE — 99213 OFFICE O/P EST LOW 20 MIN: CPT | Performed by: PHYSICAL MEDICINE & REHABILITATION

## 2020-09-24 PROCEDURE — 96132 NRPSYC TST EVAL PHYS/QHP 1ST: CPT | Performed by: PHYSICAL MEDICINE & REHABILITATION

## 2020-09-24 PROCEDURE — 1036F TOBACCO NON-USER: CPT | Performed by: PHYSICAL MEDICINE & REHABILITATION

## 2020-09-24 PROCEDURE — G8417 CALC BMI ABV UP PARAM F/U: HCPCS | Performed by: PHYSICAL MEDICINE & REHABILITATION

## 2020-09-24 NOTE — TELEPHONE ENCOUNTER
When I spoke with the patient to ask her who she sees for counsiling she stated that Efrain Rast is not helping her and if there is anything else you would prescribe. Please advise.

## 2020-09-24 NOTE — PROGRESS NOTES
Difficulty concentrating Yes   Difficulty remembering Yes   Visual problems Yes     History reviewed. No pertinent past medical history. Past Surgical History:   Procedure Laterality Date    APPENDECTOMY      LAPAROSCOPY       Social History     Tobacco Use    Smoking status: Never Smoker    Smokeless tobacco: Never Used   Substance Use Topics    Alcohol use: No    Drug use: No     Family History   Problem Relation Age of Onset    Asthma Mother     Heart Attack Father        Current Outpatient Medications   Medication Sig Dispense Refill    gabapentin (NEURONTIN) 300 MG capsule Take 600 mg by mouth 3 times daily. Currently titrating up to TID      cyclobenzaprine (FLEXERIL) 10 MG tablet TK 1 T PO TID FOR 14 DAYS PRN      vitamin D (ERGOCALCIFEROL) 1.25 MG (60451 UT) CAPS capsule TK 1 C PO Q WK      metFORMIN (GLUCOPHAGE-XR) 500 MG extended release tablet Take 500 mg by mouth daily      ranitidine (ZANTAC) 150 MG tablet Take 150 mg by mouth 2 times daily      ondansetron (ZOFRAN) 4 MG tablet Take 4 mg by mouth every 8 hours as needed for Nausea or Vomiting      meclizine (ANTIVERT) 25 MG tablet Take 25 mg by mouth 3 times daily as needed      COMFORT GEL ANTACID ANTI--400-40 MG/5ML SUSP Take 10 mLs by mouth every 4-6 hours as needed  2    acetaminophen (TYLENOL) 325 MG tablet Take 650 mg by mouth every 6 hours as needed for Pain      amitriptyline (ELAVIL) 75 MG tablet Take 1 tablet by mouth nightly (Patient not taking: Reported on 9/24/2020) 30 tablet 2     No current facility-administered medications for this visit.         Allergies   Allergen Reactions    Lidocaine Anaphylaxis     Patient was given Lidocaine at the dentist office and became unresponsive and was taken to the ER    Bactrim [Sulfamethoxazole-Trimethoprim] Rash    Procaine Hcl     Quaternium-15 Hives       Review of Systems:  No new weakness, paresthesia, incontinence of bowel or bladder, saddle anesthesia, falls or gait dysfunction. Otherwise, per HPI. Physical Exam:   Blood pressure (!) 135/98, pulse 68, resp. rate 20, height 5' 5\" (1.651 m), weight 263 lb 3.2 oz (119.4 kg), last menstrual period 09/17/2020, not currently breastfeeding. GENERAL: The patient is in no apparent distress. Body habitus is obese. Musculoskeletal: No tenderness to palpation at SCM, trapezius, cervical paraspinals. No evidence of any trigger points. No reproduction of headache at greater occipital nerve. ROM is full and painless in the spine and extremities. Neurologic:  Cognitive exam: Awake, alert and oriented in three planes. Speech is fluent. Reasoning is abstract. Judgement, planning and problem solving are intact. Attention is intact. Immediate recall is 3/3. Delayed recall is 3/3. Calculations are intact. Cranial nerves: No facial weakness or sensory loss. Tongue is midline. Palate elevates symmetrically. Hearing is intact for conversation. Pupils are equal and round. Extraocular muscles are intact. Shoulder shrug is symmetric. Sensation: Intact for light touch and pin prick in all upper and lower extremity dermatomes. Strength: 5/5 in all myotomes in the upper and lower extremities. Muscle tendon reflexes were 2+ and symmetric in the upper and lower extremities. There is no hyperalgesia or allodynia. Babinski is downgoing bilaterally. Nguyễn Hint is negative bilaterally. Coordination in the upper and lower extremities is normal. Gait is with a rollator. No clonus or spasticity. The patient was able to rise from a chair and squat without difficulty. There is no tremor. Vestibular examination: Nystagmus: No.  Smooth pursuits on EOM testing. No abnormal saccades on vertical and horizontal testing. Convergence  is <6 cm normal. No blurring or double vision with testing of VOR horizontally and vertically. Balance exam: Romberg: positive. Impression:   1. Post-concussion vertigo    2. Insomnia due to medical condition    3.

## 2020-09-25 NOTE — TELEPHONE ENCOUNTER
Called and spoke with the patient to inform her that the physician suggested trying Melatonin over the counter and if that does not work then to discuss with the psychiatrist.  Patient is aware and voiced understanding.

## 2020-09-28 ENCOUNTER — TREATMENT (OUTPATIENT)
Dept: PHYSICAL THERAPY | Age: 35
End: 2020-09-28
Payer: COMMERCIAL

## 2020-09-28 PROCEDURE — 97112 NEUROMUSCULAR REEDUCATION: CPT

## 2020-10-01 ENCOUNTER — TREATMENT (OUTPATIENT)
Dept: SPEECH THERAPY | Age: 35
End: 2020-10-01
Payer: COMMERCIAL

## 2020-10-01 PROCEDURE — 97130 THER IVNTJ EA ADDL 15 MIN: CPT | Performed by: SPEECH-LANGUAGE PATHOLOGIST

## 2020-10-01 PROCEDURE — 97129 THER IVNTJ 1ST 15 MIN: CPT | Performed by: SPEECH-LANGUAGE PATHOLOGIST

## 2020-10-02 ENCOUNTER — TELEPHONE (OUTPATIENT)
Dept: PHYSICAL THERAPY | Age: 35
End: 2020-10-02

## 2020-10-05 ENCOUNTER — TREATMENT (OUTPATIENT)
Dept: PHYSICAL THERAPY | Age: 35
End: 2020-10-05
Payer: COMMERCIAL

## 2020-10-05 ENCOUNTER — TREATMENT (OUTPATIENT)
Dept: SPEECH THERAPY | Age: 35
End: 2020-10-05
Payer: COMMERCIAL

## 2020-10-05 PROCEDURE — 97112 NEUROMUSCULAR REEDUCATION: CPT

## 2020-10-05 PROCEDURE — 97129 THER IVNTJ 1ST 15 MIN: CPT | Performed by: SPEECH-LANGUAGE PATHOLOGIST

## 2020-10-05 PROCEDURE — 97130 THER IVNTJ EA ADDL 15 MIN: CPT | Performed by: SPEECH-LANGUAGE PATHOLOGIST

## 2020-10-05 NOTE — PROGRESS NOTES
was 4-5/5. Pt unable to tolerate standing, sitting activities. Pt continues to have severe back/hip pain. When ambulating pt has increased difficulty picking up left leg. Continues with ringing in the ears. Pt has difficulty with performing VOR keeping eyes on object.    P: Continue with rehab plan to improve balance and vertigo  Libby Ours, PTA    Treatment Charges: Mins Units   Initial Evaluation     Re-Evaluation     Ther Exercise         TE     Manual Therapy     MT     Ther Activities        TA     Gait Training          GT     Neuro Re-education NR 45 3   Modalities     Non-Billable Service Time     Other     Total Time/Units 45 3

## 2020-10-07 NOTE — PROGRESS NOTES
Patient seen for 60 minute in person session with son present this date. Patient was able to provide specific details about activites over the weekend and details about pain she experienced and how she managed it. Her speech fluency was near normal today during initial conversation. We worked on word repetition today and she was able to recall only 2/4 or 2/5 related words presented verbally. Worked on repetition of short sentences with patient achieving only 50% with mod/max cues for repetition needed. Increased response time noted with each task which was inconsistent throughout the session. Homework practice tasks encouraged. Will continue. Jolie Vera.  Merian Kayser, MA/CCC-SLP  [id number]    [facility] [id number]

## 2020-10-07 NOTE — PROGRESS NOTES
Patient seen for 60 minute in person session this date. Patient reports she is still experiencing a lot of pain in extremities and having difficulty ambulating. She was able to detail events over the last few days and speech was more fluent with fewer episodes of anomia than in prior session. However, when we started working on tasks, her speech became slower and she started to have increased pauses between words. She completed immediate memory tasks with 45% avg acc despite cues; repetition of 4 related words was consistently only 3/4 or 3/5. Spontaneous conversation recall was good. We discussed working on brain exercise games at over the weekend for homework and she agreed to try. Will continue. Gómez Avery.  Washington Molina MA/CCC-SLP  [id number]    Cpt [id number]

## 2020-10-09 ENCOUNTER — TREATMENT (OUTPATIENT)
Dept: PHYSICAL THERAPY | Age: 35
End: 2020-10-09
Payer: COMMERCIAL

## 2020-10-09 PROCEDURE — 97112 NEUROMUSCULAR REEDUCATION: CPT

## 2020-10-12 ENCOUNTER — TREATMENT (OUTPATIENT)
Dept: PHYSICAL THERAPY | Age: 35
End: 2020-10-12
Payer: COMMERCIAL

## 2020-10-12 ENCOUNTER — TREATMENT (OUTPATIENT)
Dept: SPEECH THERAPY | Age: 35
End: 2020-10-12
Payer: COMMERCIAL

## 2020-10-12 PROCEDURE — 97129 THER IVNTJ 1ST 15 MIN: CPT | Performed by: SPEECH-LANGUAGE PATHOLOGIST

## 2020-10-12 PROCEDURE — 97130 THER IVNTJ EA ADDL 15 MIN: CPT | Performed by: SPEECH-LANGUAGE PATHOLOGIST

## 2020-10-12 PROCEDURE — 97112 NEUROMUSCULAR REEDUCATION: CPT

## 2020-10-12 NOTE — PROGRESS NOTES
Physical Therapy Daily Treatment Note    Date: 10/12/2020  Patient Name: Lorenza Ortiz  : 1985   MRN: <[id number]>  DOInjury: 2018  DOSx: -  Referring Provider:  Marissa Mendoza                    Medical Diagnosis: Concussion with loss of consciousness of 30 minutes or less, initial encounter [S06.0X1A]  Outcome Measure:  Lower Extremity Functional Scale (LEFS) 84% impairment  DHI score:  100 pts     S: pt reports been going over to [facility] and dr is going to order a EMG. Increased the gabapentin. Can't remember things. Continue to be in pain in back down left leg. Constant pain in back/left leg 9/10. Left top/side of foot swelled up since Thursday. Soaked foot a little better today. Trying to get a EMG from [facility] and haven' recd any notification on it. Did nothing to cause it. Continues to have issues with vertigo  O: amb into clinic with Rolator walker. Time 7617-2237     Visit -     Pain 5/10 Left lower extremity     ROM      Manual maneuvers      Julian Hallpike R                      L   negative    negative     Epley      Exercise      Nustep   L5/10 min 3.5 scale for nausea    Sit/Stands 4/30 sec Use of upper extremities     TUG test      Gait training 2 x 100 feet Antalgic gain Left lower extremity        NMR Balance activities to aid in safe community and home ambulation    VOR                        vertical                              horizontal  x 10   x 10 Nausea/dizzy  Nausea/dizzy     Saccades  x 10 Dizzy/nausea    Smooth Pursuit      ABCD      Ball pass over head down                7x Mod dizzy, had to sit  Scale 4.5    Diagonal ball chops      Nose to knee 10x  R/L Scale 4    Marching Gait      Sidestepping      Gait with head turns f/e                                  Rot                                  LF       Ball press up squat eyes out   eyes on ball      90* turn step up                  A:  Tolerated fair. Above added to written HEP.  Very unsteady and dizzy with all activities. Scale 0-5 intensity it was 4-5/5. Pt unable to tolerate standing, sitting activities. Pt continues to have severe back/hip pain. When ambulating pt has increased difficulty picking up left leg. Continues with ringing in the ears. Pt has difficulty with performing VOR keeping eyes on object.   Increased nausea at end of tx.  P: Continue with rehab plan to improve balance and vertigo  Carlos Shelton PTA    Treatment Charges: Mins Units   Initial Evaluation     Re-Evaluation     Ther Exercise         TE     Manual Therapy     MT     Ther Activities        TA     Gait Training          GT     Neuro Re-education NR 45 3   Modalities     Non-Billable Service Time     Other     Total Time/Units 45 3

## 2020-10-14 ENCOUNTER — TREATMENT (OUTPATIENT)
Dept: PHYSICAL THERAPY | Age: 35
End: 2020-10-14
Payer: COMMERCIAL

## 2020-10-14 PROCEDURE — 97112 NEUROMUSCULAR REEDUCATION: CPT

## 2020-10-14 NOTE — PROGRESS NOTES
Physical Therapy Daily Treatment Note    Date: 10/14/2020  Patient Name: Rajiv Shabazz  : 1985   MRN: <[id number]>  DOInjury: 2018  DOSx: -  Referring Provider:  Ivonne Garduno                    Medical Diagnosis: Concussion with loss of consciousness of 30 minutes or less, initial encounter [S06.0X1A]  Outcome Measure:  Lower Extremity Functional Scale (LEFS) 84% impairment  DHI score:  100 pts     S: pt reports been going over to [facility] and dr is going to order a EMG. Increased the gabapentin. Can't remember things. Continue to be in pain in back down left leg. Constant pain in back/left leg 9/10. Left top/side of foot swelled up since Thursday. Soaked foot a little better today. Trying to get a EMG from [facility] and haven' recd any notification on it. Did nothing to cause it. Continues to have issues with vertigo  O: amb into clinic with Rolator walker. Time 2006-0578     Visit -     Pain 5/10 Left lower extremity     ROM      Manual maneuvers      Julian Hallpike R                      L   negative    negative     Epley      Exercise      Nustep   L5/10 min 3.5 scale for nausea    Sit/Stands 4/30 sec Use of upper extremities     TUG test      Gait training 2 x 100 feet Antalgic gain Left lower extremity        NMR Balance activities to aid in safe community and home ambulation    VOR                        vertical                              horizontal  x 15   x 15 Nausea/dizzy  Nausea/dizzy     Saccades  x 15 Dizzy/nausea    Smooth Pursuit      ABCD      Ball pass over head down                7x Mod dizzy, had to sit  Scale 4.5    Diagonal ball chops      Nose to knee 10x  R/L Scale 4    Marching Gait      Sidestepping      Gait with head turns f/e                                  Rot                                  LF       Ball press up squat eyes out   eyes on ball      90* turn step up                  A:  Tolerated fair. Above added to written HEP.  Very unsteady and dizzy with all activities. Scale 0-5 intensity it was 4-5/5. Pt unable to tolerate standing, sitting activities. Pt continues to have severe back/hip pain. When ambulating pt has increased difficulty picking up left leg. Continues with ringing in the ears. Pt has difficulty with performing VOR keeping eyes on object.   Increased nausea at end of tx.  P: Continue with rehab plan to improve balance and vertigo  Carlos Shelton PTA    Treatment Charges: Mins Units   Initial Evaluation     Re-Evaluation     Ther Exercise         TE     Manual Therapy     MT     Ther Activities        TA     Gait Training          GT     Neuro Re-education NR 45 3   Modalities     Non-Billable Service Time     Other     Total Time/Units 45 3

## 2020-10-16 NOTE — PROGRESS NOTES
Patient seen for 60 minute in person session this date. Patient reports ongoing significant pain 'all down the front and sides' of her body. Reports that there is nothing that relieves it. She states that it is 'really hard' to focus on tasks due to the pain. She, again, was able to give me much details about her weekend activities and things going on at home. Her speech was fluent with no hesitations that were present during our initial evaluation. Her processing speed during spontaneous conversations is within functional limits. However, when we started working on tasks with short term recall of 3 words, she started to show significant delays in processing and would generate words that were not related in any way to the words she was given. She was educated on strategies for coding to improve recall but they were not successful in practice and she was only able to recall 1/3 or 1/4 words. We worked on simple recall and focus games on the [employer] She scored in the 0 percentile for all tasks with standard scores in the 30's and 40's. This is highly unusual to score so poorly in all areas. Homework tasks were discussed and strongly encouraged. She was instructed that working on her brain exercises may help her manage her pain because she won't be focused on the pain but rather the tasks. She states she will try. Will continue. If even slight progress is not seen within the next 2 weeks, may consider referral to physician for further testing. Marilee Dunne.  Zulma Whitehead MA/CCC-SLP  [id number]    [facility] [id number]

## 2020-10-19 ENCOUNTER — TELEPHONE (OUTPATIENT)
Dept: SPEECH THERAPY | Age: 35
End: 2020-10-19

## 2020-10-26 ENCOUNTER — TREATMENT (OUTPATIENT)
Dept: PHYSICAL THERAPY | Age: 35
End: 2020-10-26
Payer: COMMERCIAL

## 2020-10-26 ENCOUNTER — TREATMENT (OUTPATIENT)
Dept: SPEECH THERAPY | Age: 35
End: 2020-10-26
Payer: COMMERCIAL

## 2020-10-26 PROCEDURE — 97129 THER IVNTJ 1ST 15 MIN: CPT | Performed by: SPEECH-LANGUAGE PATHOLOGIST

## 2020-10-26 PROCEDURE — 97112 NEUROMUSCULAR REEDUCATION: CPT

## 2020-10-26 PROCEDURE — 97130 THER IVNTJ EA ADDL 15 MIN: CPT | Performed by: SPEECH-LANGUAGE PATHOLOGIST

## 2020-10-26 NOTE — PROGRESS NOTES
Physical Therapy Daily Treatment Note    Date: 10/26/2020  Patient Name: Won Adams  : 1985   MRN: <[id number]>  DOInjury: 2018  DOSx: -  Referring Provider:  Theodore Fernandez                    Medical Diagnosis: Concussion with loss of consciousness of 30 minutes or less, initial encounter [S06.0X1A]  Outcome Measure:  Lower Extremity Functional Scale (LEFS) 84% impairment  DHI score:  100 pts     S: pt reports falling 2x in the last week. Last  Walking with her son using walker \"just fell\". on  took a few steps and foot just gave out. Inga Leslie and left entire foot is hurting. pt reports been going over to [facility] and dr is going to order a EMG. Increased the gabapentin. Can't remember things. Continue to be in pain in back down left leg. Constant pain in back/left leg 9/10. Left top/side of foot swelled up since Thursday. Soaked foot a little better today. Trying to get a EMG from [facility] and haven' recd any notification on it. Did nothing to cause it. Continues to have issues with vertigo  O: amb into clinic with Rolator walker.   Time [id number]     Visit -18     Pain 5/10 Left lower extremity     ROM      Manual maneuvers      Milton Hallpike R                      L   negative    negative     Epley      Exercise      Nustep   L5/10 min 3.5/5 scale for nausea    Sit/Stands 5/30 sec Use of upper extremities     TUG test      Gait training 2 x 100 feet Antalgic gain Left lower extremity        NMR Balance activities to aid in safe community and home ambulation    VOR                        vertical                              horizontal  x 15   x 15 Nausea/dizzy  Nausea/dizzy     Saccades  x 15 Dizzy/nausea    Smooth Pursuit      ABCD      Ball pass over head down                15x Mod dizzy, had to sit  Scale 4.5/5    Diagonal ball chops      Nose to knee 10x  R/L Scale 4/5    Marching Gait      Sidestepping      Gait with head turns f/e Rot                                  LF       Ball press up squat eyes out   eyes on ball      90* turn step up                  A:  Tolerated fair. Above added to written HEP. Very unsteady and dizzy with all activities. Scale 0-5 intensity it was 4-5/5. Pt unable to tolerate standing, sitting activities. Pt continues to have severe back/hip pain. When ambulating pt has increased difficulty picking up left leg. Continues with ringing in the ears. Pt has difficulty with performing VOR keeping eyes on object.   Increased nausea at end of tx.  P: Continue with rehab plan to improve balance and vertigo  Gus Gonzaelz PTA    Treatment Charges: Mins Units   Initial Evaluation     Re-Evaluation     Ther Exercise         TE     Manual Therapy     MT     Ther Activities        TA     Gait Training          GT     Neuro Re-education NR 45 3   Modalities     Non-Billable Service Time     Other     Total Time/Units 45 3

## 2020-11-05 ENCOUNTER — TELEPHONE (OUTPATIENT)
Dept: PHYSICAL THERAPY | Age: 35
End: 2020-11-05

## 2020-11-05 ENCOUNTER — TREATMENT (OUTPATIENT)
Dept: SPEECH THERAPY | Age: 35
End: 2020-11-05
Payer: COMMERCIAL

## 2020-11-05 PROCEDURE — 97130 THER IVNTJ EA ADDL 15 MIN: CPT | Performed by: SPEECH-LANGUAGE PATHOLOGIST

## 2020-11-05 PROCEDURE — 97129 THER IVNTJ 1ST 15 MIN: CPT | Performed by: SPEECH-LANGUAGE PATHOLOGIST

## 2020-11-13 NOTE — PROGRESS NOTES
Patient seen for 45 minute in person session this date with her son present. Session was shortened due to patient calling and cancelling her session  But then showed up a bit late. She was able to again detail specifics about adl's since last session. We worked on recall of 3 words today with patient unable to recall any more than 2/3 90% of the time. She was instructed on using techniques of chunking, making a sentence with the words, etc. However, this did not appear to help with recall improvement. It remains unusual that patient has such poor recall skills of simple tasks yet is able to recall specifics of adl's. Encouraged homework activities. If progress doesn't occur in the next few sessions, may consider discharge. Will continue. Sia Davis.  Cam Orta MA/ROJELIO-SLP  [id number]    [facility] [phone]

## 2020-11-24 ENCOUNTER — TELEPHONE (OUTPATIENT)
Dept: PHYSICAL MEDICINE AND REHAB | Age: 35
End: 2020-11-24

## 2020-11-25 NOTE — TELEPHONE ENCOUNTER
Called and spoke with the patient to inform her that her letter is ready to be picked up. Patient is aware and voiced understanding.

## 2020-12-14 ENCOUNTER — TELEPHONE (OUTPATIENT)
Dept: PHYSICAL MEDICINE AND REHAB | Age: 35
End: 2020-12-14

## 2020-12-14 NOTE — TELEPHONE ENCOUNTER
Spoke to Dr. Liz Santos regarding disability claim. Discussed patients physical and cognitive restrictions.

## 2020-12-14 NOTE — TELEPHONE ENCOUNTER
Dr Sunshine Joshi neurologist called and would like to talk to you regarding this patient. [phone]   Please advise.

## 2021-03-23 ENCOUNTER — OFFICE VISIT (OUTPATIENT)
Dept: PHYSICAL MEDICINE AND REHAB | Age: 36
End: 2021-03-23
Payer: COMMERCIAL

## 2021-03-23 VITALS
WEIGHT: 264 LBS | HEART RATE: 91 BPM | HEIGHT: 65 IN | DIASTOLIC BLOOD PRESSURE: 90 MMHG | BODY MASS INDEX: 43.99 KG/M2 | SYSTOLIC BLOOD PRESSURE: 138 MMHG

## 2021-03-23 DIAGNOSIS — R42 POST-CONCUSSION VERTIGO: ICD-10-CM

## 2021-03-23 DIAGNOSIS — G47.00 INSOMNIA, UNSPECIFIED TYPE: ICD-10-CM

## 2021-03-23 DIAGNOSIS — F07.81 POST CONCUSSIVE SYNDROME: Primary | ICD-10-CM

## 2021-03-23 DIAGNOSIS — G44.311 INTRACTABLE ACUTE POST-TRAUMATIC HEADACHE: ICD-10-CM

## 2021-03-23 DIAGNOSIS — F07.81 POST-CONCUSSION VERTIGO: ICD-10-CM

## 2021-03-23 PROCEDURE — 1036F TOBACCO NON-USER: CPT | Performed by: PHYSICAL MEDICINE & REHABILITATION

## 2021-03-23 PROCEDURE — G8427 DOCREV CUR MEDS BY ELIG CLIN: HCPCS | Performed by: PHYSICAL MEDICINE & REHABILITATION

## 2021-03-23 PROCEDURE — 99214 OFFICE O/P EST MOD 30 MIN: CPT | Performed by: PHYSICAL MEDICINE & REHABILITATION

## 2021-03-23 PROCEDURE — G8484 FLU IMMUNIZE NO ADMIN: HCPCS | Performed by: PHYSICAL MEDICINE & REHABILITATION

## 2021-03-23 PROCEDURE — G8417 CALC BMI ABV UP PARAM F/U: HCPCS | Performed by: PHYSICAL MEDICINE & REHABILITATION

## 2021-03-23 RX ORDER — CHOLECALCIFEROL (VITAMIN D3) 25 MCG
3 TABLET ORAL NIGHTLY PRN
Qty: 30 TABLET | Refills: 11 | Status: SHIPPED
Start: 2021-03-23 | End: 2022-04-25 | Stop reason: ALTCHOICE

## 2021-03-23 RX ORDER — PREGABALIN 75 MG/1
CAPSULE ORAL
COMMUNITY
Start: 2021-02-23 | End: 2022-04-25 | Stop reason: ALTCHOICE

## 2021-03-23 RX ORDER — GABAPENTIN 600 MG/1
TABLET ORAL
COMMUNITY
Start: 2021-01-25 | End: 2021-07-12 | Stop reason: ALTCHOICE

## 2021-03-23 RX ORDER — VENLAFAXINE HYDROCHLORIDE 150 MG/1
CAPSULE, EXTENDED RELEASE ORAL
COMMUNITY
Start: 2021-03-09 | End: 2022-04-25 | Stop reason: ALTCHOICE

## 2021-03-23 RX ORDER — DULOXETIN HYDROCHLORIDE 30 MG/1
CAPSULE, DELAYED RELEASE ORAL
COMMUNITY
Start: 2021-03-22 | End: 2021-12-21

## 2021-03-23 NOTE — PROGRESS NOTES
Marylee Grand, D.O. [facility]  [address] [facility] [place]  Phone: [phone]  Fax: [phone]        3/23/21    Chief Complaint   Patient presents with    Concussion       HPI:  Sushant Benitez is a 37y.o. year old woman seen today in follow up regarding concussion. Interval history: Since the last visit the patient has been following with Dr. Drena Lefort at [facility] pain management for her low back. She has continued to have headaches, right ear tinnitus, dizziness, off balance, memory loss, reading comprehension and concentration difficulties. She has continued with counseling at [facility] and also sees the nurse practitioner. Her long term disability was approved through her employer. She has an appointment with Dr. China Piedra next month to follow up regarding tinnitus. She reports difficulty falling asleep and frequent nighttime awakenings. Today, the pain is rated Pain Score:   9 where 0 is no pain and 10 is pain as bad as it can be. The pain is located in the right frontal region,  does not radiate, and is described as pressure. This pain occurs intermittently. The symptoms have been better since onset. Symptoms are exacerbated by lights and sounds. Factors which relieve the pain include closing eyes, not moving. Otherwise, the pain assessment has not changed since the last visit. History reviewed. No pertinent past medical history.     Past Surgical History:   Procedure Laterality Date    APPENDECTOMY      LAPAROSCOPY       Social History     Tobacco Use    Smoking status: Never Smoker    Smokeless tobacco: Never Used   Substance Use Topics    Alcohol use: No    Drug use: No     Family History   Problem Relation Age of Onset    Asthma Mother     Heart Attack Father        Current Outpatient Medications   Medication Sig Dispense Refill    venlafaxine (EFFEXOR XR) 150 MG extended release capsule take 1 capsule by mouth every morning  pregabalin (LYRICA) 75 MG capsule take 1 capsule by mouth twice a day      gabapentin (NEURONTIN) 600 MG tablet take 1 tablet by mouth three times a day      DULoxetine (CYMBALTA) 30 MG extended release capsule       Melatonin CR 3 MG TBCR Take 3 mg by mouth nightly as needed (insomnia) 30 tablet 11    cyclobenzaprine (FLEXERIL) 10 MG tablet TK 1 T PO TID FOR 14 DAYS PRN      vitamin D (ERGOCALCIFEROL) 1.25 MG (63620 UT) CAPS capsule TK 1 C PO Q WK      metFORMIN (GLUCOPHAGE-XR) 500 MG extended release tablet Take 500 mg by mouth daily      ranitidine (ZANTAC) 150 MG tablet Take 150 mg by mouth 2 times daily      ondansetron (ZOFRAN) 4 MG tablet Take 4 mg by mouth every 8 hours as needed for Nausea or Vomiting      meclizine (ANTIVERT) 25 MG tablet Take 25 mg by mouth 3 times daily as needed      COMFORT GEL ANTACID ANTI--400-40 MG/5ML SUSP Take 10 mLs by mouth every 4-6 hours as needed  2    acetaminophen (TYLENOL) 325 MG tablet Take 650 mg by mouth every 6 hours as needed for Pain       No current facility-administered medications for this visit. Allergies   Allergen Reactions    Lidocaine Anaphylaxis     Patient was given Lidocaine at the dentist office and became unresponsive and was taken to the ER    Bactrim [Sulfamethoxazole-Trimethoprim] Rash    Procaine Hcl     Quaternium-15 Hives       Review of Systems:  No new weakness, paresthesia, incontinence of bowel or bladder, saddle anesthesia, falls or gait dysfunction. Otherwise, per HPI. Physical Exam:   Blood pressure (!) 138/90, pulse 91, height 5' 5\" (1.651 m), weight 264 lb (119.7 kg), not currently breastfeeding. GENERAL: The patient is in no apparent distress. Body habitus is obese. Musculoskeletal: No tenderness to palpation at SCM, trapezius, cervical paraspinals. No evidence of any trigger points. No reproduction of headache at greater occipital nerve. ROM is full and painless in the spine and extremities. Neurologic:  Cognitive exam: Awake, alert and oriented in three planes. Speech is fluent. Reasoning is abstract. Judgement, planning and problem solving are intact. Attention is intact. Immediate recall is 3/3. Delayed recall is 3/3. Calculations are intact. Cranial nerves: No facial weakness or sensory loss. Tongue is midline. Palate elevates symmetrically. Hearing is intact for conversation. Pupils are equal and round. Extraocular muscles are intact. Shoulder shrug is symmetric. Sensation: Intact for light touch and pin prick in all upper and lower extremity dermatomes. Strength: 5/5 in all myotomes in the upper and lower extremities. Muscle tendon reflexes were 2+ and symmetric in the upper and lower extremities. There is no hyperalgesia or allodynia. Babinski is downgoing bilaterally. Olita Tito is negative bilaterally. Coordination in the upper and lower extremities is normal. Gait is with a rollator. No clonus or spasticity. The patient was able to rise from a chair and squat without difficulty. There is no tremor. Vestibular examination: Nystagmus: No.  Smooth pursuits on EOM testing. No abnormal saccades on vertical and horizontal testing. Convergence  is <6 cm normal. No blurring or double vision with testing of VOR horizontally and vertically. Balance exam: Romberg: positive. Impression:   1. Post concussive syndrome    2. Insomnia, unspecified type    3. Post-concussion vertigo    4. Intractable acute post-traumatic headache        Plan:  Orders Placed This Encounter   Procedures   [address] [place] [address], [place], DO, Sleep Medicine, [place]     Referral Priority:   Routine     Referral Type:   Eval and Treat     Referral Reason:   Specialty Services Required     Referred to Provider:   Mary Guidry DO     Number of Visits Requested:   1     Continue close follow up with psychology.    The patient was educated about the diagnosis, prognosis, indications, risks and benefits of treatment. An opportunity to ask questions was given to the patient and questions were answered. The patient agreed to proceed with the recommended treatment as described above. Follow up 1 year    Thank you for allowing me to participate in the care of your patient. Ana Paulino D.O., P.T.   Board Certified Physical Medicine and Rehabilitation  Board Certified Electrodiagnostic Medicine

## 2021-07-12 ENCOUNTER — OFFICE VISIT (OUTPATIENT)
Dept: ENT CLINIC | Age: 36
End: 2021-07-12

## 2021-07-12 ENCOUNTER — PROCEDURE VISIT (OUTPATIENT)
Dept: AUDIOLOGY | Age: 36
End: 2021-07-12

## 2021-07-12 VITALS
BODY MASS INDEX: 44.65 KG/M2 | WEIGHT: 268 LBS | DIASTOLIC BLOOD PRESSURE: 98 MMHG | HEART RATE: 91 BPM | SYSTOLIC BLOOD PRESSURE: 146 MMHG | HEIGHT: 65 IN

## 2021-07-12 DIAGNOSIS — H91.91 UNILATERAL HEARING LOSS, RIGHT: Primary | ICD-10-CM

## 2021-07-12 DIAGNOSIS — H92.01 RIGHT EAR PAIN: ICD-10-CM

## 2021-07-12 DIAGNOSIS — H93.11 TINNITUS OF RIGHT EAR: ICD-10-CM

## 2021-07-12 PROCEDURE — 92567 TYMPANOMETRY: CPT | Performed by: AUDIOLOGIST

## 2021-07-12 PROCEDURE — 92557 COMPREHENSIVE HEARING TEST: CPT | Performed by: AUDIOLOGIST

## 2021-07-12 PROCEDURE — 99214 OFFICE O/P EST MOD 30 MIN: CPT | Performed by: OTOLARYNGOLOGY

## 2021-07-12 RX ORDER — PREGABALIN 100 MG/1
2 CAPSULE ORAL 2 TIMES DAILY
COMMUNITY
Start: 2021-06-15 | End: 2022-04-25 | Stop reason: ALTCHOICE

## 2021-07-12 RX ORDER — PREDNISONE 20 MG/1
20 TABLET ORAL DAILY
Qty: 7 TABLET | Refills: 0 | Status: SHIPPED | OUTPATIENT
Start: 2021-07-12 | End: 2021-07-19

## 2021-07-12 NOTE — PROGRESS NOTES
Increase in tinnitus with change audiogram ojn the right     Will ordr MRI IAC as well pred         [facility] Otolaryngology  Dr. Brittany Carroll. RAI Reddy Ms.Ed. New Consult       Patient Name:  Tita Villarreal  :  1985     CHIEF C/O:    Chief Complaint   Patient presents with    Tinnitus     no change        HISTORY OBTAINED FROM:  patient    HISTORY OF PRESENT ILLNESS:       Mayank Adan is a 37y.o. year old female, here today for known history of intermittent episodes of ear fullness pressure and tinnitus, who presents today with repeat audiogram.  No current complaints of new hearing loss, or vertigo. No complaints of difficulty swallowing change in voice shortness of breath stridor. No other complaints of nasal congestion epistaxis. No prior imaging studies have been conducted, and no history of previous ear surgeries or or otologic trauma. No past medical history on file. Past Surgical History:   Procedure Laterality Date    APPENDECTOMY      LAPAROSCOPY         Current Outpatient Medications:     pregabalin (LYRICA) 100 MG capsule, Take 2 capsules by mouth 2 times daily. , Disp: , Rfl:     venlafaxine (EFFEXOR XR) 150 MG extended release capsule, take 1 capsule by mouth every morning, Disp: , Rfl:     DULoxetine (CYMBALTA) 30 MG extended release capsule, , Disp: , Rfl:     cyclobenzaprine (FLEXERIL) 10 MG tablet, TK 1 T PO TID FOR 14 DAYS PRN, Disp: , Rfl:     vitamin D (ERGOCALCIFEROL) 1.25 MG (88934 UT) CAPS capsule, TK 1 C PO Q WK, Disp: , Rfl:     ondansetron (ZOFRAN) 4 MG tablet, Take 4 mg by mouth every 8 hours as needed for Nausea or Vomiting, Disp: , Rfl:     meclizine (ANTIVERT) 25 MG tablet, Take 25 mg by mouth 3 times daily as needed, Disp: , Rfl:     COMFORT GEL ANTACID ANTI--400-40 MG/5ML SUSP, Take 10 mLs by mouth every 4-6 hours as needed, Disp: , Rfl: 2    acetaminophen (TYLENOL) 325 MG tablet, Take 650 mg by mouth every 6 hours as needed for Pain, Disp: , Rfl:   
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
## 2021-08-12 ENCOUNTER — TELEPHONE (OUTPATIENT)
Dept: AUDIOLOGY | Age: 36
End: 2021-08-12

## 2021-08-23 ENCOUNTER — HOSPITAL ENCOUNTER (OUTPATIENT)
Dept: MRI IMAGING | Age: 36
Discharge: HOME | End: 2021-08-25
Payer: MEDICARE

## 2021-08-23 DIAGNOSIS — H91.91 UNILATERAL HEARING LOSS, RIGHT: ICD-10-CM

## 2021-08-23 PROCEDURE — A9579 GAD-BASE MR CONTRAST NOS,1ML: HCPCS | Performed by: RADIOLOGY

## 2021-08-23 PROCEDURE — 70553 MRI BRAIN STEM W/O & W/DYE: CPT

## 2021-08-23 PROCEDURE — 6360000004 HC RX CONTRAST MEDICATION: Performed by: RADIOLOGY

## 2021-08-23 RX ADMIN — GADOTERIDOL 20 ML: 279.3 INJECTION, SOLUTION INTRAVENOUS at 14:12

## 2022-05-14 NOTE — PROGRESS NOTES
22    RE:  Daniel Wellington   : 1985   AGE: 31 y.o. REFERRING PHYSICIANs:                                               Danika Bello      Dear Drs. Thank you for referring Daniel Wellington a 31 y.o. Franklin Slice who is seen today in our office. REASON FOR CONSULTATION:  · Consult for advice and opinion on morbidly obese patient with history of first trimester exposure to suvorexant (Belsomra)    Mrs Daniel Wellington gave the following history when I saw her today:    OB History    Para Term  AB Living   2 1 1 0 0 1   SAB IAB Ectopic Molar Multiple Live Births   0 0 0 0 0 1      # Outcome Date GA Lbr Jeovanny/2nd Weight Sex Delivery Anes PTL Lv   2 Current            1 Term 13 39w5d  5 lb 13 oz (2.637 kg) M Vag-Spont EPI N [place]     PAST GYNECOLOGICAL  HISTORY:  Positive for:  · HPV, and abnormal pap smears requiring evaluation with a colposcopy , at office of Dr. Bart Velasquez, resolved. · CT , treated  Negative for other sexually transmitted diseases. PAST MEDICAL HISTORY:  Past Medical History:   Diagnosis Date    Abnormal Pap smear of cervix     Complication of anesthesia     hard to wake up    Depression     Negative for Hypertension, Diabetes, Thyroid disease , Asthma or Heart disease. PAST SURGICAL HISTORY:  Past Surgical History:   Procedure Laterality Date    APPENDECTOMY      LAPAROSCOPY      fibroids removed    TONSILLECTOMY      WISDOM TOOTH EXTRACTION     Negative for  Cholecystectomy or surgery on the cervix such as LEEP, [facility] or Cryotherapy.     ALLERGIES:    Allergies   Allergen Reactions    Lidocaine Anaphylaxis     Patient was given Lidocaine at the dentist office and became unresponsive and was taken to the ER    Bactrim [Sulfamethoxazole-Trimethoprim] Rash    Procaine Hcl     Quaternium-15 Hives     MEDICATIONS:    Prenatal Vitamins, cannot tolerate, I told her to take instead Cascade Gummies 2/day.    SOCIAL  HISTORY: Denies smoking, Alcohol and Drug abuse. REVIEW OF SYSTEMS:    CONSTITUTIONAL : No fever, no chills   HEENT :  No headache, no visual changes, no rhinorrhea, no sore throat   CARDIOVASCULAR :  No pain, no palpitations, no edema   RESPIRATORY :  No pain, no shortness of breath   GASTROINTESTINAL : No N/V, no D/C, no abdominal pain   GENITOURINARY :  No dysuria, hematuria and no incontinence   MUSCULOSKELETAL:  No myalgia, No back pain  NEUROLOGICAL :  No numbness, no tingling, no tremors. No history of seizures    FAMILY MEDICAL HISTORY:   Negative for birth defects, chromosomal anomalies and Mental retardation. OB Genetic Screening    Patient's Age 35+ at Date of Delivery No     Cystic Fibrosis No     Thalassemia MCV<80 No     [place] Chorea No     Neural Tube Defect No     Mental Retardation/Autism No     Congenital Heart Defect No     Was Person Treated for Fragilex? No     Down Syndrome No     Other Inherited Genetic Chromosomal Disorder? No     Cuauhtemoc-Sachs No     Maternal Metabolic Disorder No     Patient or [de-identified] Father Had Other Defects? No     Recurrent Pregnancy Loss or Still Birth? No     Sickle Cell Disease or Trait No     Hemophilia No     Muscular Dystrophy No        OB Infection History    Blood Type O Positive     Patient or partner has Hepatitis C No     Live with Someone with or Exposed to TB? No     History of STD/GC/Chlamydia/HPV/Syphilis? Yes chlamydia - treated    Patient or Partner has Hx of Genital Herpes? No     Rash or Viral Illness Since LMP? No     Patient or partner has Hepatitis B No        During this pregnancy, Mrs. Gibson Apt was:  · Seen in your office on 3/2/2022 at 8 weeks of gestation with vaginal irritation, itching and discharge, was diagnosed to have acute vaginitis and UTI, was diagnosed to have BV was treated with Flagyl 500 mg twice per day for 7 days.   · Seen again in your office 4/25/2022 at 16 weeks of gestation, was complaining of morning sickness, received treatment with vitamin B6 and Unisom. She said she was taking Belsomra for anxiety and depression (did not know she was pregnant), stopped. When seen today in our office she had no complaints. PHYSICAL EXAMINATION:    General Appearance:  Healthy looking, alert, no acute distress. Eyes:     No pallor, no icterus, no photophobia. Ears:     No ear drainage. Nose:     No nasal drainage, no paranasal sinus tenderness. Throat:   Mucosa moist, no oral thrush, no exudate. Neck:     No nuchal rigidity. Back:     No CVA tenderness. Abdomen:    Soft nontender. Extremities:    No pretibial pitting edema, no calf muscle tenderness. Skin:     No rashes, no lesions. /87, repeated BP: (!) 128/90 Weight: 267 lb 4 oz (121.2 kg)   Pulse: 110     Body mass index is 43.14 kg/m². Urine dipstick:  Glucose : Negative   Albumin:  Trace       An ultrasound evaluation was done in our office today. I reviewed the ultrasound pictures stored in the hard drive of the ultrasound machine with the patient. Please refer to the enclosed copy of the ultrasound report for further information. IMPRESSION:  1. A 14w1d intrauterine pregnancy. 2.  Morbid maternal obesity. 3. History of anxiety and depression, not requiring treatment at present (stopped Belsomra)  4. First trimester exposure to [facility]), stopped. 5. S/p myomectomy in 2012 in [place], followed by normal vaginal delivery 2013 with Dr. aBrt Velasquez. RECOMMENDATIONS/PLAN:  I discussed with the patient the following points:    1. The benefits and limitations of ultrasound in prenatal diagnosis. Some defects might not always be seen by ultrasound. Estimated incidence of these defects in the general population is 2- 4%. 2. No structural  anomalies are noted. Only genetic amniocentesis can rule out fetal chromosome anomalies. Normal ultrasound does not.    3. The size of her baby is appropriate for gestational age. 4. Based on her age and the absence of chromosomal markers by ultrasound today, the risk of chromosomal anomalies is small and does not indicate a need for an amniocentesis, a procedure that might cause pregnancy loss ( the risk of loss is quoted to be between 1:200 to 1:500). 5. An amniocentesis is indicated if fetal structural defects are seen or if the first Trimester,  second trimester hormonal screening test (quad screen) , or if the cell free DNA test NIPT: non-invasive prenatal test) showed an increase risk for Chromosomal anomalies. 6.  She declined the genetic amniocentesis. I discussed the cell free DNA test ( NIPT)  with the patient. I advised her that this a screening test not a diagnostic test.The test screens only for trisomy 21, 18 and 13. She understands that the cell free DNA is  a screening test, it does not replace the diagnostic genetic amniocentesis. She agreed to have the [place] test. It was ordered today. 7. Obesity is assosiated with an increased risk of developing gestational diabetes, and disturbance in the growth of her baby, such as Large for dates and small for Dates. Gestational diabetes should be ruled out with a two hour Glucose tolerance test. The test was ordered today. If negative it should be repeated at 26-28 weeks of gestation. 8. Exposure to different medications such as  Belsomra (Suvorexant), in the first trimester of pregnancy increases the likelihood of having a baby with birth defects. No structural anomalies are seen today by ultrasound . Not all birth defects, however, can be seen by ultrasound, and some defects might show on ultrasounds done later in pregnancy. 9. Suvorexant is classified as Category C medication in pregnancy.  Animal reproduction studies have shown an adverse effect on the fetus and there are no adequate and well-controlled studies in humans, but potential benefits may warrant use of the drug in pregnant women despite potential risks. 10. The patient is to continue to follow with you in your office for ongoing obstetric care. 11. The Ultrasound evaluation today was suboptimal because of the morbid maternal obesity and the early gestation. I recommend a repeat evaluation in our office in 4 to 6 weeks. Thank you again, doctor, for allowing us to be of service to your patient. If I can be of further assistance, please do not hesitate to call. Sincerely,        Joana Holt M.D., [address] [facility]      The total time in minutes spent reviewing medical records, reviewing imaging studies, performing ultrasonic imaging, reviewing laboratory testing, and documenting information was 40 minutes, of which, 50% of the time was spent in patient education, counseling, and coordinating care with the patient, her provider, and/or her family. I answered all of her questions to her satisfaction. Current encounter billing:  ID OFFICE CONSULTATION NEW/ESTAB PATIENT 40 MIN [[id number]]  US OB 14 Plus Weeks Single or First Gestation [[id number] Custom]    **This report has been created using voice recognition software.  It may contain minor errors     which are inherent in voice recognition technology**

## 2022-05-18 ENCOUNTER — ANCILLARY PROCEDURE (OUTPATIENT)
Dept: OBGYN CLINIC | Age: 37
End: 2022-05-18
Payer: COMMERCIAL

## 2022-05-18 ENCOUNTER — INITIAL PRENATAL (OUTPATIENT)
Dept: OBGYN CLINIC | Age: 37
End: 2022-05-18
Payer: COMMERCIAL

## 2022-05-18 VITALS
DIASTOLIC BLOOD PRESSURE: 82 MMHG | SYSTOLIC BLOOD PRESSURE: 120 MMHG | HEART RATE: 110 BPM | WEIGHT: 267.25 LBS | BODY MASS INDEX: 43.14 KG/M2

## 2022-05-18 DIAGNOSIS — Z36.89 ENCOUNTER FOR FETAL ANATOMIC SURVEY: ICD-10-CM

## 2022-05-18 DIAGNOSIS — E66.01 MATERNAL MORBID OBESITY IN SECOND TRIMESTER, ANTEPARTUM (HCC): Primary | ICD-10-CM

## 2022-05-18 DIAGNOSIS — O35.5XX0 SUSPECTED DAMAGE TO FETUS FROM DRUGS, AFFECTING MANAGEMENT OF MOTHER, SINGLE OR UNSPECIFIED FETUS: ICD-10-CM

## 2022-05-18 DIAGNOSIS — E66.01 MORBID OBESITY (HCC): ICD-10-CM

## 2022-05-18 DIAGNOSIS — Z13.79 ENCOUNTER FOR OTHER SCREENING FOR GENETIC AND CHROMOSOMAL ANOMALIES: ICD-10-CM

## 2022-05-18 DIAGNOSIS — O99.212 MATERNAL MORBID OBESITY IN SECOND TRIMESTER, ANTEPARTUM (HCC): Primary | ICD-10-CM

## 2022-05-18 DIAGNOSIS — Z3A.14 14 WEEKS GESTATION OF PREGNANCY: ICD-10-CM

## 2022-05-18 LAB
GLUCOSE URINE, POC: NEGATIVE
PROTEIN UA: POSITIVE

## 2022-05-18 PROCEDURE — 76805 OB US >/= 14 WKS SNGL FETUS: CPT | Performed by: OBSTETRICS & GYNECOLOGY

## 2022-05-18 PROCEDURE — 99203 OFFICE O/P NEW LOW 30 MIN: CPT | Performed by: OBSTETRICS & GYNECOLOGY

## 2022-05-18 PROCEDURE — G8417 CALC BMI ABV UP PARAM F/U: HCPCS | Performed by: OBSTETRICS & GYNECOLOGY

## 2022-05-18 PROCEDURE — G8427 DOCREV CUR MEDS BY ELIG CLIN: HCPCS | Performed by: OBSTETRICS & GYNECOLOGY

## 2022-05-18 PROCEDURE — 99203 OFFICE O/P NEW LOW 30 MIN: CPT

## 2022-05-18 PROCEDURE — 81002 URINALYSIS NONAUTO W/O SCOPE: CPT | Performed by: OBSTETRICS & GYNECOLOGY

## 2022-05-18 NOTE — PATIENT INSTRUCTIONS
Patient Education        Weeks 18 to 22 of Your Pregnancy: Care Instructions  Overview     Your baby is continuing to develop quickly. Sometime between 18 and 22 weeks, you'll probably start to feel your baby move. At first, these small fetal movements feel like fluttering or \"butterflies. \" Or they may feel like gas bubbles. As your baby grows, these movements will become stronger. You may also notice that your baby hiccups. Babies at this stage cannow suck their thumbs. You may find that your nausea and fatigue are gone. You may feel better overall and have more energy than you did in your first trimester. But you might now also have some new discomforts, like sleep problems or leg cramps. Talk to yourdoctor about things you can do at home to ease these problems. Follow-up care is a key part of your treatment and safety. Be sure to make and go to all appointments, and call your doctor if you are having problems. It's also a good idea to know your test results and keep alist of the medicines you take. How can you care for yourself at home? Ease sleep problems   Avoid caffeine in drinks or chocolate late in the day.  Get some exercise every day.  Take a warm shower or bath before bed.  Have a light snack or glass of milk at bedtime.  Do relaxation exercises in bed to calm your mind and body.  Support your legs and back with extra pillows. Try a pillow between your legs if you sleep on your side.  Do not use sleeping pills or alcohol. They could harm your baby. Ease leg cramps   Do not massage your calf during the cramp.  Sit on a firm bed or chair. Straighten your leg, and bend your foot (flex your ankle) slowly upward, toward your knee. Bend your toes up and down.  Stand on a cool, flat surface. Stretch your toes upward, and take small steps walking on your heels.  Use a heating pad or hot water bottle to help with muscle ache. Prevent leg cramps   Be sure to get enough calcium.  If you are worried that you are not getting enough, talk to your doctor.  Exercise every day, and stretch your legs before bed.  Take a warm bath before bed, and try leg warmers at night. Where can you learn more? Go to https://clyde.healthPediatric Bioscience. org and sign in to your Zilift account. Enter A113 in the [facility] box to learn more about \"Weeks 18 to 22 of Your Pregnancy: Care Instructions. \"     If you do not have an account, please click on the \"Sign Up Now\" link. Current as of: June 16, 2021               Content Version: 13.2  © 7108-2133 [employer]. Care instructions adapted under license by [facility] ([facility]). If you have questions about a medical condition or this instruction, always ask your healthcare professional. [address] any warranty or liability for your use of this information. Patient Education        Learning About When to Call Your Doctor During Pregnancy (Up to 20 Weeks)  Overview     It's common to have concerns about what might be a problem during your pregnancy. Most pregnancies don't have any serious problems. But it's stillimportant to know when to call your doctor if you have certain symptoms. These are general suggestions. Your doctor may give you some more informationabout when to call. When to call your doctor (up to 20 weeks)  Call 911 anytime you think you may need emergency care. For example, call if:   You passed out (lost consciousness). Call your doctor now or seek immediate medical care if:   You have a fever.  You have vaginal bleeding.  You are dizzy or lightheaded, or you feel like you may faint.  You have symptoms of a urinary tract infection. These may include:  ? Pain or burning when you urinate. ? A frequent need to urinate without being able to pass much urine. ? Pain in the flank, which is just below the rib cage and above the waist on either side of the back. ? Blood in your urine.    You have belly pain.  You think you are having contractions.  You have a sudden release of fluid from your vagina. Watch closely for changes in your health, and be sure to contact your doctor if:   You have vaginal discharge that smells bad.  You have other concerns about your pregnancy. Follow-up care is a key part of your treatment and safety. Be sure to make and go to all appointments, and call your doctor if you are having problems. It's also a good idea to know your test results and keep alist of the medicines you take. Where can you learn more? Go to https://Cupplepepiceweb.Cloud.CM. org and sign in to your MoonClerk account. Enter A465 in the [employer] box to learn more about \"Learning About When to Call Your Doctor During Pregnancy (Up to 20 Weeks). \"     If you do not have an account, please click on the \"Sign Up Now\" link. Current as of: June 16, 2021               Content Version: 13.2  © 2006-2022 Healthwise, [employer]. Care instructions adapted under license by [facility]. If you have questions about a medical condition or this instruction, always ask your healthcare professional. [address] any warranty or liability for your use of this information. Please arrive for your scheduled appointment at least 15 minutes early with your actual insurance card+ a photo ID. Also if you need any refills ordered or have questions, it may take up 48 hours to reply. Please allow ample time for your refills. Call me when you use last refill. Thank you for your cooperation. Call your primary obstetrician with bleeding, leaking of fluid, abdominal tenderness, headache, blurry vision, epigastric pain and increased urinary frequency. If you are experiencing an emergency and need immediate help, call 911 or go to go emergency room or labor and delivery.  if you are sick, not feeling well or have an infectious process going on please reschedule your appointment by calling [phone]. Also if any family members are not feeling well, please do not bring them to your appointment. We appreciate your cooperation. We are doing this in order to protect our pregnant mothers+ their babies. if you are sick, not feeling well or have an infectious process going on please reschedule your appointment by calling [phone]. Also if any family members are not feeling well, please do not bring them to your appointment. We appreciate your cooperation. We are doing this in order to protect our pregnant mothers+ their babies. Patient Education        Weeks 14 to 18 of Your Pregnancy: Care Instructions  Overview     During this time, you may start to \"show,\" so that you look pregnant to people around you. You may also notice some changes in your skin, such as itchy spotson your palms or acne on your face. Your baby is now able to pass urine. And your baby's first stool (meconium) is starting to collect in your baby's intestines. Hair is also starting to grow onyour baby's head. At your next visit, between weeks 18 and 20, your doctor may do an ultrasound test. The test allows your doctor to check for certain problems. Your doctor can also tell the sex of your baby. So this a good time to think about whetheryou want to know. Talk to your doctor about getting a flu shot to help keep you healthy duringyour pregnancy. As your pregnancy moves along, it's common to worry or feel anxious. Your body is changing a lot. And you are thinking about giving birth, the health of your baby, and becoming a parent. You can talk to your doctor about any anxiety andstress you feel. Follow-up care is a key part of your treatment and safety. Be sure to make and go to all appointments, and call your doctor if you are having problems. It's also a good idea to know your test results and keep alist of the medicines you take. How can you care for yourself at home?   Reduce stress     Ask for help with cooking and housekeeping.      Figure out who or what causes your stress. Avoid these people or situations as much as possible.      Relax every day. Taking 10- to 15-minute breaks can make a big difference. Take a walk, listen to music, or take a warm bath.      Learn relaxation techniques at prenatal or yoga class. Or buy a relaxation tape.      List your fears about having a baby and becoming a parent. Share the list with someone you trust. Decide which worries are really small, and try to let them go. Exercise     If you did not exercise much before pregnancy, start slowly. Walking is best. [employer], and do a little more every day.      Brisk walking, easy jogging, low-impact aerobics, water aerobics, and yoga are good choices. Some sports, such as scuba diving, horseback riding, downhill skiing, gymnastics, and water skiing, are not a good idea.      Try to do at least 2½ hours a week of moderate exercise, such as a fast walk. One way to do this is to be active 30 minutes a day, at least 5 days a week.      Wear loose clothing. And wear shoes and a bra that provide good support.      Warm up and cool down to start and finish your exercise.      If you want to use weights, be sure to use light weights. They reduce stress on your joints. Stay at the best weight for you     Experts recommend that you gain about 1 pound a month during the first 3 months of your pregnancy.      Experts recommend that you gain about 1 pound a week during your last 6 months of pregnancy, for a total weight gain of 25 to 35 pounds.      If you are underweight, you will need to gain more weight (about 28 to 40 pounds).      If you are overweight, you may not need to gain as much weight (about 15 to 25 pounds).      If you are gaining weight too fast, use common sense. Exercise every day, and limit sweets, fast foods, and fats.  Choose lean meats, fruits, and vegetables.      If you are having twins or more, your doctor may refer you to a dietitian. Where can you learn more? Go to https://chpekimeweb.healthQualnetics. org and sign in to your Saberr account. Enter E593 in the [facility] box to learn more about \"Weeks 14 to 18 of Your Pregnancy: Care Instructions. \"     If you do not have an account, please click on the \"Sign Up Now\" link. Current as of: June 16, 2021               Content Version: 13.2  © 2006-2022 Healthwise, Incorporated. Care instructions adapted under license by [facility] ([facility]). If you have questions about a medical condition or this instruction, always ask your healthcare professional. [address] any warranty or liability for your use of this information.

## 2022-05-18 NOTE — LETTER
22    RE:  Alejandra Rubio   : 1985   AGE: 31 y.o. REFERRING PHYSICIANs:                                               Fátima Lazar      Dear Drs. Thank you for referring Alejandra bruno 31 y.o. Amaury Quijano who is seen today in our office. REASON FOR CONSULTATION:  · Consult for advice and opinion on morbidly obese patient with history of first trimester exposure to suvorexant (Belsomra)    Mrs Alejandra Rubio gave the following history when I saw her today:    OB History    Para Term  AB Living   2 1 1 0 0 1   SAB IAB Ectopic Molar Multiple Live Births   0 0 0 0 0 1      # Outcome Date GA Lbr Jeovanny/2nd Weight Sex Delivery Anes PTL Lv   2 Current            1 Term 13 39w5d  5 lb 13 oz (2.637 kg) M Vag-Spont EPI N [place]     PAST GYNECOLOGICAL  HISTORY:  Positive for:  · HPV, and abnormal pap smears requiring evaluation with a colposcopy , at office of Dr. Jada Kiran, resolved. · CT , treated  Negative for other sexually transmitted diseases. PAST MEDICAL HISTORY:  Past Medical History:   Diagnosis Date    Abnormal Pap smear of cervix     Complication of anesthesia     hard to wake up    Depression     Negative for Hypertension, Diabetes, Thyroid disease , Asthma or Heart disease. PAST SURGICAL HISTORY:  Past Surgical History:   Procedure Laterality Date    APPENDECTOMY      LAPAROSCOPY      fibroids removed    TONSILLECTOMY      WISDOM TOOTH EXTRACTION     Negative for  Cholecystectomy or surgery on the cervix such as LEEP, [facility] or Cryotherapy.     ALLERGIES:    Allergies   Allergen Reactions    Lidocaine Anaphylaxis     Patient was given Lidocaine at the dentist office and became unresponsive and was taken to the ER    Bactrim [Sulfamethoxazole-Trimethoprim] Rash    Procaine Hcl     Quaternium-15 Hives     MEDICATIONS:    Prenatal Vitamins, cannot tolerate, I told her to take instead [place] Gummies 2/day.    SOCIAL  HISTORY: Denies smoking, Alcohol and Drug abuse. REVIEW OF SYSTEMS:    CONSTITUTIONAL : No fever, no chills   HEENT :  No headache, no visual changes, no rhinorrhea, no sore throat   CARDIOVASCULAR :  No pain, no palpitations, no edema   RESPIRATORY :  No pain, no shortness of breath   GASTROINTESTINAL : No N/V, no D/C, no abdominal pain   GENITOURINARY :  No dysuria, hematuria and no incontinence   MUSCULOSKELETAL:  No myalgia, No back pain  NEUROLOGICAL :  No numbness, no tingling, no tremors. No history of seizures    FAMILY MEDICAL HISTORY:   Negative for birth defects, chromosomal anomalies and Mental retardation. OB Genetic Screening    Patient's Age 35+ at Date of Delivery No     Cystic Fibrosis No     Thalassemia MCV<80 No     [place] Chorea No     Neural Tube Defect No     Mental Retardation/Autism No     Congenital Heart Defect No     Was Person Treated for Fragilex? No     Down Syndrome No     Other Inherited Genetic Chromosomal Disorder? No     Cuauhtemoc-Sachs No     Maternal Metabolic Disorder No     Patient or [de-identified] Father Had Other Defects? No     Recurrent Pregnancy Loss or Still Birth? No     Sickle Cell Disease or Trait No     Hemophilia No     Muscular Dystrophy No        OB Infection History    Blood Type O Positive     Patient or partner has Hepatitis C No     Live with Someone with or Exposed to TB? No     History of STD/GC/Chlamydia/HPV/Syphilis? Yes chlamydia - treated    Patient or Partner has Hx of Genital Herpes? No     Rash or Viral Illness Since LMP? No     Patient or partner has Hepatitis B No        During this pregnancy, Mrs. Dylan Tan was:  · Seen in your office on 3/2/2022 at 8 weeks of gestation with vaginal irritation, itching and discharge, was diagnosed to have acute vaginitis and UTI, was diagnosed to have BV was treated with Flagyl 500 mg twice per day for 7 days.   · Seen again in your office 4/25/2022 at 16 weeks of gestation, was complaining of morning sickness, received treatment with vitamin B6 and Unisom. She said she was taking Belsomra for anxiety and depression (did not know she was pregnant), stopped. When seen today in our office she had no complaints. PHYSICAL EXAMINATION:    General Appearance:  Healthy looking, alert, no acute distress. Eyes:     No pallor, no icterus, no photophobia. Ears:     No ear drainage. Nose:     No nasal drainage, no paranasal sinus tenderness. Throat:   Mucosa moist, no oral thrush, no exudate. Neck:     No nuchal rigidity. Back:     No CVA tenderness. Abdomen:    Soft nontender. Extremities:    No pretibial pitting edema, no calf muscle tenderness. Skin:     No rashes, no lesions. /87, repeated BP: (!) 128/90 Weight: 267 lb 4 oz (121.2 kg)   Pulse: 110     Body mass index is 43.14 kg/m². Urine dipstick:  Glucose : Negative   Albumin:  Trace       An ultrasound evaluation was done in our office today. I reviewed the ultrasound pictures stored in the hard drive of the ultrasound machine with the patient. Please refer to the enclosed copy of the ultrasound report for further information. IMPRESSION:  1. A 14w1d intrauterine pregnancy. 2.  Morbid maternal obesity. 3. History of anxiety and depression, not requiring treatment at present (stopped Belsomra)  4. First trimester exposure to [facility]), stopped. 5. S/p myomectomy in 2012 in [place], followed by normal vaginal delivery 2013 with Dr. Destiny Fontenot. RECOMMENDATIONS/PLAN:  I discussed with the patient the following points:    1. The benefits and limitations of ultrasound in prenatal diagnosis. Some defects might not always be seen by ultrasound. Estimated incidence of these defects in the general population is 2- 4%. 2. No structural  anomalies are noted. Only genetic amniocentesis can rule out fetal chromosome anomalies. Normal ultrasound does not.    3. The size of her baby is appropriate for gestational age. 4. Based on her age and the absence of chromosomal markers by ultrasound today, the risk of chromosomal anomalies is small and does not indicate a need for an amniocentesis, a procedure that might cause pregnancy loss ( the risk of loss is quoted to be between 1:200 to 1:500). 5. An amniocentesis is indicated if fetal structural defects are seen or if the first Trimester,  second trimester hormonal screening test (quad screen) , or if the cell free DNA test NIPT: non-invasive prenatal test) showed an increase risk for Chromosomal anomalies. 6.  She declined the genetic amniocentesis. I discussed the cell free DNA test ( NIPT)  with the patient. I advised her that this a screening test not a diagnostic test.The test screens only for trisomy 21, 18 and 13. She understands that the cell free DNA is  a screening test, it does not replace the diagnostic genetic amniocentesis. She agreed to have the [place] test. It was ordered today. 7. Obesity is assosiated with an increased risk of developing gestational diabetes, and disturbance in the growth of her baby, such as Large for dates and small for Dates. Gestational diabetes should be ruled out with a two hour Glucose tolerance test. The test was ordered today. If negative it should be repeated at 26-28 weeks of gestation. 8. Exposure to different medications such as  Belsomra (Suvorexant), in the first trimester of pregnancy increases the likelihood of having a baby with birth defects. No structural anomalies are seen today by ultrasound . Not all birth defects, however, can be seen by ultrasound, and some defects might show on ultrasounds done later in pregnancy. 9. Suvorexant is classified as Category C medication in pregnancy.  Animal reproduction studies have shown an adverse effect on the fetus and there are no adequate and well-controlled studies in humans, but potential benefits may warrant use of the drug in pregnant women despite potential risks. 10. The patient is to continue to follow with you in your office for ongoing obstetric care. 11. The Ultrasound evaluation today was suboptimal because of the morbid maternal obesity and the early gestation. I recommend a repeat evaluation in our office in 4 to 6 weeks. Thank you again, doctor, for allowing us to be of service to your patient. If I can be of further assistance, please do not hesitate to call. Sincerely,        Patricia Loyola M.D., [address] [facility]      The total time in minutes spent reviewing medical records, reviewing imaging studies, performing ultrasonic imaging, reviewing laboratory testing, and documenting information was 40 minutes, of which, 50% of the time was spent in patient education, counseling, and coordinating care with the patient, her provider, and/or her family. I answered all of her questions to her satisfaction. Current encounter billing:  GA OFFICE CONSULTATION NEW/ESTAB PATIENT 40 MIN [[id number]]  US OB 14 Plus Weeks Single or First Gestation [[id number] Custom]    **This report has been created using voice recognition software.  It may contain minor errors     which are inherent in voice recognition technology**

## 2022-05-18 NOTE — PROGRESS NOTES
Pt here for pregnancy ultrasound  Pt states her due date is 11/15/2022  Pt very nauseated and not feeling well-dizzy and tired  Pt c/o low pelvic pressure

## 2022-05-19 ENCOUNTER — TELEPHONE (OUTPATIENT)
Dept: OBGYN CLINIC | Age: 37
End: 2022-05-19

## 2022-05-19 NOTE — TELEPHONE ENCOUNTER
Left message for pt to return call regarding 2 hr sugar ordered by Dr Eunice Pineda and if she was able to discuss with her insurance.

## 2022-05-25 ENCOUNTER — TELEPHONE (OUTPATIENT)
Dept: OBGYN CLINIC | Age: 37
End: 2022-05-25

## 2022-05-25 NOTE — TELEPHONE ENCOUNTER
After verifying name and , Panorama results given to pt. Pt did not want to know gender. She also states that she has not been able to get through to anyone at her insurance company to ask about 2hr glucose testing. She was encouraged to let us know so that the test could be ordered.

## 2022-05-26 ENCOUNTER — TELEPHONE (OUTPATIENT)
Dept: OBGYN CLINIC | Age: 37
End: 2022-05-26

## 2022-05-26 DIAGNOSIS — E66.01 MATERNAL MORBID OBESITY IN SECOND TRIMESTER, ANTEPARTUM (HCC): ICD-10-CM

## 2022-05-26 DIAGNOSIS — O99.810 ABNORMAL GLUCOSE TOLERANCE AFFECTING PREGNANCY, ANTEPARTUM: ICD-10-CM

## 2022-05-26 DIAGNOSIS — Z3A.14 14 WEEKS GESTATION OF PREGNANCY: Primary | ICD-10-CM

## 2022-05-26 DIAGNOSIS — O99.212 MATERNAL MORBID OBESITY IN SECOND TRIMESTER, ANTEPARTUM (HCC): ICD-10-CM

## 2022-05-26 NOTE — TELEPHONE ENCOUNTER
Pt called in stating that she did speak with her insurance company and they do not need a PA for 2hr glucose testing. Pt was informed that notification stated that she may be responsible for $27 She verbalized understanding.

## 2022-06-15 ENCOUNTER — ROUTINE PRENATAL (OUTPATIENT)
Dept: OBGYN CLINIC | Age: 37
End: 2022-06-15
Payer: COMMERCIAL

## 2022-06-15 ENCOUNTER — ANCILLARY PROCEDURE (OUTPATIENT)
Dept: OBGYN CLINIC | Age: 37
End: 2022-06-15
Payer: COMMERCIAL

## 2022-06-15 VITALS
DIASTOLIC BLOOD PRESSURE: 84 MMHG | HEART RATE: 72 BPM | BODY MASS INDEX: 43.43 KG/M2 | SYSTOLIC BLOOD PRESSURE: 135 MMHG | WEIGHT: 269.1 LBS

## 2022-06-15 DIAGNOSIS — E66.01 MATERNAL MORBID OBESITY IN SECOND TRIMESTER, ANTEPARTUM (HCC): Primary | ICD-10-CM

## 2022-06-15 DIAGNOSIS — O35.5XX0 SUSPECTED DAMAGE TO FETUS FROM DRUGS, AFFECTING MANAGEMENT OF MOTHER, SINGLE OR UNSPECIFIED FETUS: ICD-10-CM

## 2022-06-15 DIAGNOSIS — Z03.75 SUSPECTED SHORTENING OF CERVIX NOT FOUND: ICD-10-CM

## 2022-06-15 DIAGNOSIS — Z36.89 ENCOUNTER FOR FETAL ANATOMIC SURVEY: ICD-10-CM

## 2022-06-15 DIAGNOSIS — Z3A.18 18 WEEKS GESTATION OF PREGNANCY: ICD-10-CM

## 2022-06-15 DIAGNOSIS — O99.212 MATERNAL MORBID OBESITY IN SECOND TRIMESTER, ANTEPARTUM (HCC): Primary | ICD-10-CM

## 2022-06-15 LAB
GLUCOSE URINE, POC: NEGATIVE
PROTEIN UA: ABNORMAL

## 2022-06-15 PROCEDURE — G8417 CALC BMI ABV UP PARAM F/U: HCPCS | Performed by: OBSTETRICS & GYNECOLOGY

## 2022-06-15 PROCEDURE — 99212 OFFICE O/P EST SF 10 MIN: CPT | Performed by: OBSTETRICS & GYNECOLOGY

## 2022-06-15 PROCEDURE — 1036F TOBACCO NON-USER: CPT | Performed by: OBSTETRICS & GYNECOLOGY

## 2022-06-15 PROCEDURE — 99213 OFFICE O/P EST LOW 20 MIN: CPT | Performed by: OBSTETRICS & GYNECOLOGY

## 2022-06-15 PROCEDURE — 76811 OB US DETAILED SNGL FETUS: CPT | Performed by: OBSTETRICS & GYNECOLOGY

## 2022-06-15 PROCEDURE — G8427 DOCREV CUR MEDS BY ELIG CLIN: HCPCS | Performed by: OBSTETRICS & GYNECOLOGY

## 2022-06-15 PROCEDURE — 99213 OFFICE O/P EST LOW 20 MIN: CPT

## 2022-06-15 PROCEDURE — 76817 TRANSVAGINAL US OBSTETRIC: CPT | Performed by: OBSTETRICS & GYNECOLOGY

## 2022-06-15 NOTE — PROGRESS NOTES
Pt alphosno 97.9  Pt here for bpp  Pt voiced no fm  Pt voiced bad pelvic pain.   Pt denies lof vag bleeding or contractions  Pt would like rx for prenatal gummies

## 2022-06-15 NOTE — LETTER
6/15/22     RE:  Wanda Arechiga   : 1985       AGE: 31 y.o. REFERRING PHYSICIANs:                                               Asim Bunch      Dear Jacqui Mrs. Wanda Arechiga a 31 y.o.  Abdi Valero  is seen today on follow up in our office. REASON FOR APPOINTMENT:  · Follow-up on a morbidly obese patient with history of first trimester exposure to suvorexant (Belsomra)    MEDICATIONS:    [place] Gummies 2/day. Unisom once per day. For treatment of morning sickness. Vitamin B6 for treatment of morning sickness. INTERVAL HISTORY:  Mrs Wanda Arechiga had an uneventful course of pregnancy since her last visit to our office. When seen today in our office she was still complaining of morning sickness for which reason the 2-hour GTT was not done yet. PHYSICAL EXAMINATION:  General Appearance:  Healthy looking, alert, no acute distress. Eyes:     No pallor, no icterus, no photophobia. Ears:     No ear drainage. Nose:     No nasal drainage, no paranasal sinus tenderness. Throat:   Mucosa moist, no oral thrush, no exudate. Neck:     No nuchal rigidity. Back:     No CVA tenderness. Abdomen:    Soft nontender. Extremities:    No pretibial pitting edema, no calf muscle tenderness. Skin:     No rashes, no lesions. BP: 135/84 Weight: 269 lb 1.6 oz (122.1 kg)   Heart Rate: 72     Body mass index is 43.43 kg/m². Urine dipstick:  Glucose : Negative   Albumin:  1+       An ultrasound evaluation was done in our office today. I reviewed the ultrasound pictures stored in the hard drive of the ultrasound machine with the patient. Please refer to the enclosed copy of the ultrasound report for further information. Chart and Lab Work Review:    I reviewed with the patient the result of the:  · Cell free DNA test collected on 2020 that showed low probability of having baby with trisomy 24, 25 or 13. IMPRESSION:  1.  A  18w1d  intrauterine gestation. 2.  Morbid maternal obesity. 3. History of anxiety and depression, not requiring treatment at present (stopped Belsomra)  4. First trimester exposure to [facility]), stopped. 5. S/p myomectomy in 2012 in [place], followed by normal vaginal delivery 2013 with Dr. Amanda Randhawa. RECOMMENDATIONS/PLAN:  I discussed with the patient the following points:    1. The benefits and limitations of ultrasound in prenatal diagnosis. Some defects might not always be seen by ultrasound. Estimated incidence of these defects in the general population is 2- 4%. 2. No structural  anomalies are noted. Only genetic amniocentesis can rule out fetal chromosome anomalies. Normal ultrasound does not. 3. The size of her baby is appropriate for gestational age. 4. Based on her age and the absence of chromosomal markers by ultrasound today, the risk of chromosomal anomalies is small and does not indicate a need for an amniocentesis, a procedure that might cause pregnancy loss ( the risk of loss is quoted to be between 1:200 to 1:500). 5. An amniocentesis is indicated if fetal structural defects are seen or if the first Trimester,  second trimester hormonal screening test (quad screen) , or if the cell free DNA test NIPT: non-invasive prenatal test) showed an increase risk for Chromosomal anomalies. 6.  She was reassured by the result of the cell free DNA test. I explained to her that this a screening test not a diagnostic test. It does not replace the diagnostic genetic amniocentesis. It screens only for trisomy 24, 25 and 13.  7. Obesity is assosiated with an increased risk of developing gestational diabetes, and disturbance in the growth of her baby, such as Large for dates and small for Dates. Gestational diabetes should be ruled out with a two hour Glucose tolerance test. The test was ordered on her last visit, not done yet because of the morning sickness.  If negative it should be repeated at 26-28 weeks of gestation. 8. Exposure to different medications such as  Belsomra (Suvorexant), in the first trimester of pregnancy increases the likelihood of having a baby with birth defects. No structural anomalies are seen today by ultrasound . Not all birth defects, however, can be seen by ultrasound, and some defects might show on ultrasounds done later in pregnancy. 9. Suvorexant is classified as Category C medication in pregnancy. Animal reproduction studies have shown an adverse effect on the fetus and there are no adequate and well-controlled studies in humans, but potential benefits may warrant use of the drug in pregnant women despite potential risks. 10. The cervical length measurement today is reassuring. It is within normal limits. No funneling or shortening were noted. 11. Fetal well-being was confirmed today. The amount of fluid around baby is normal.  The Biophysical profile score of 8/8 is reassuring, and the umbilical artery Doppler studies are normal.  12. She should monitor fetal well-being at home by counting movements after dinner. Her baby should  move 10 times in 2 hours; otherwise, she should call your office immediately. She is also to call, if she develops any headaches, blurred vision, abdominal pain, bleeding, or spotting, which are signs of preeclampsia. 13. She is to continue to follow with you in your office for ongoing obstetric care. 14. I recommend follow-up ultrasound evaluation in our [facility] office in 6 weeks to check on fetal wellbeing anatomy and growth. Thank you again, doctor, for allowing us to be of service to your patient. If I can be of further assistance, please do not hesitate to call.       Sincerely,        Brenda Jha M.D., [address] [facility]    The total time in minutes spent reviewing medical records, reviewing imaging studies, performing ultrasonic imaging, reviewing laboratory testing, and documenting information was over 20  minutes, of which, 50% of the time was spent in patient education, counseling, and coordinating care with the patient, her provider, and/or her family. I answered all of her questions to her satisfaction. Current encounter billing:  [place] OFFICE OUTPATIENT VISIT 10-19 MINUTES [[id number]]  US OB Detail Fetal Anatomy Single or 1st [[id number] Custom]  US OB Transvaginal [id number] Custom]    **This report has been created using voice recognition software.  It may contain minor errors     which are inherent in voice recognition technology**

## 2022-06-15 NOTE — PROGRESS NOTES
6/15/22     RE:  Payam Noyola   : 1985       AGE: 31 y.o. REFERRING PHYSICIANs:                                               Venecia Alvarez      Dear DrsPina Mrs. Payam Noyola a 31 y.o.  Edward Vu  is seen today on follow up in our office. REASON FOR APPOINTMENT:  · Follow-up on a morbidly obese patient with history of first trimester exposure to suvorexant (Belsomra)    MEDICATIONS:    [place] Gummies 2/day. Unisom once per day. For treatment of morning sickness. Vitamin B6 for treatment of morning sickness. INTERVAL HISTORY:  Mrs Payam Noyola had an uneventful course of pregnancy since her last visit to our office. When seen today in our office she was still complaining of morning sickness for which reason the 2-hour GTT was not done yet. PHYSICAL EXAMINATION:  General Appearance:  Healthy looking, alert, no acute distress. Eyes:     No pallor, no icterus, no photophobia. Ears:     No ear drainage. Nose:     No nasal drainage, no paranasal sinus tenderness. Throat:   Mucosa moist, no oral thrush, no exudate. Neck:     No nuchal rigidity. Back:     No CVA tenderness. Abdomen:    Soft nontender. Extremities:    No pretibial pitting edema, no calf muscle tenderness. Skin:     No rashes, no lesions. BP: 135/84 Weight: 269 lb 1.6 oz (122.1 kg)   Heart Rate: 72     Body mass index is 43.43 kg/m². Urine dipstick:  Glucose : Negative   Albumin:  1+       An ultrasound evaluation was done in our office today. I reviewed the ultrasound pictures stored in the hard drive of the ultrasound machine with the patient. Please refer to the enclosed copy of the ultrasound report for further information. Chart and Lab Work Review:    I reviewed with the patient the result of the:  · Cell free DNA test collected on 2020 that showed low probability of having baby with trisomy 24, 25 or 13. IMPRESSION:  1.  A  18w1d  intrauterine gestation. 2.  Morbid maternal obesity. 3. History of anxiety and depression, not requiring treatment at present (stopped Belsomra)  4. First trimester exposure to [facility]), stopped. 5. S/p myomectomy in 2012 in [place], followed by normal vaginal delivery 2013 with Dr. Kassie Hernandez. RECOMMENDATIONS/PLAN:  I discussed with the patient the following points:    1. The benefits and limitations of ultrasound in prenatal diagnosis. Some defects might not always be seen by ultrasound. Estimated incidence of these defects in the general population is 2- 4%. 2. No structural  anomalies are noted. Only genetic amniocentesis can rule out fetal chromosome anomalies. Normal ultrasound does not. 3. The size of her baby is appropriate for gestational age. 4. Based on her age and the absence of chromosomal markers by ultrasound today, the risk of chromosomal anomalies is small and does not indicate a need for an amniocentesis, a procedure that might cause pregnancy loss ( the risk of loss is quoted to be between 1:200 to 1:500). 5. An amniocentesis is indicated if fetal structural defects are seen or if the first Trimester,  second trimester hormonal screening test (quad screen) , or if the cell free DNA test NIPT: non-invasive prenatal test) showed an increase risk for Chromosomal anomalies. 6.  She was reassured by the result of the cell free DNA test. I explained to her that this a screening test not a diagnostic test. It does not replace the diagnostic genetic amniocentesis. It screens only for trisomy 24, 25 and 13.  7. Obesity is assosiated with an increased risk of developing gestational diabetes, and disturbance in the growth of her baby, such as Large for dates and small for Dates. Gestational diabetes should be ruled out with a two hour Glucose tolerance test. The test was ordered on her last visit, not done yet because of the morning sickness.  If negative it should be repeated at 26-28 weeks of gestation. 8. Exposure to different medications such as  Belsomra (Suvorexant), in the first trimester of pregnancy increases the likelihood of having a baby with birth defects. No structural anomalies are seen today by ultrasound . Not all birth defects, however, can be seen by ultrasound, and some defects might show on ultrasounds done later in pregnancy. 9. Suvorexant is classified as Category C medication in pregnancy. Animal reproduction studies have shown an adverse effect on the fetus and there are no adequate and well-controlled studies in humans, but potential benefits may warrant use of the drug in pregnant women despite potential risks. 10. The cervical length measurement today is reassuring. It is within normal limits. No funneling or shortening were noted. 11. Fetal well-being was confirmed today. The amount of fluid around baby is normal.  The Biophysical profile score of 8/8 is reassuring, and the umbilical artery Doppler studies are normal.  12. She should monitor fetal well-being at home by counting movements after dinner. Her baby should  move 10 times in 2 hours; otherwise, she should call your office immediately. She is also to call, if she develops any headaches, blurred vision, abdominal pain, bleeding, or spotting, which are signs of preeclampsia. 13. She is to continue to follow with you in your office for ongoing obstetric care. 14. I recommend follow-up ultrasound evaluation in our [facility] office in 6 weeks to check on fetal wellbeing anatomy and growth. Thank you again, doctor, for allowing us to be of service to your patient. If I can be of further assistance, please do not hesitate to call.       Sincerely,        Alfonso Martinez M.D., [address] [facility]    The total time in minutes spent reviewing medical records, reviewing imaging studies, performing ultrasonic imaging, reviewing laboratory testing, and documenting information was over 20  minutes, of which, 50% of the time was spent in patient education, counseling, and coordinating care with the patient, her provider, and/or her family. I answered all of her questions to her satisfaction. Current encounter billing:  [place] OFFICE OUTPATIENT VISIT 10-19 MINUTES [[id number]]  US OB Detail Fetal Anatomy Single or 1st [[id number] Custom]  US OB Transvaginal [id number] Custom]    **This report has been created using voice recognition software.  It may contain minor errors     which are inherent in voice recognition technology**

## 2022-07-27 ENCOUNTER — HOSPITAL ENCOUNTER (OUTPATIENT)
Age: 37
Setting detail: OBSERVATION
Discharge: HOME | End: 2022-07-27
Attending: OBSTETRICS & GYNECOLOGY | Admitting: OBSTETRICS & GYNECOLOGY
Payer: COMMERCIAL

## 2022-07-27 ENCOUNTER — TELEPHONE (OUTPATIENT)
Dept: OBGYN CLINIC | Age: 37
End: 2022-07-27

## 2022-07-27 ENCOUNTER — ANCILLARY PROCEDURE (OUTPATIENT)
Dept: OBGYN CLINIC | Age: 37
End: 2022-07-27
Payer: COMMERCIAL

## 2022-07-27 ENCOUNTER — ROUTINE PRENATAL (OUTPATIENT)
Dept: OBGYN CLINIC | Age: 37
End: 2022-07-27
Payer: COMMERCIAL

## 2022-07-27 VITALS
BODY MASS INDEX: 43.01 KG/M2 | WEIGHT: 266.5 LBS | SYSTOLIC BLOOD PRESSURE: 119 MMHG | HEART RATE: 107 BPM | DIASTOLIC BLOOD PRESSURE: 84 MMHG

## 2022-07-27 DIAGNOSIS — O99.212 MATERNAL MORBID OBESITY IN SECOND TRIMESTER, ANTEPARTUM (HCC): Primary | ICD-10-CM

## 2022-07-27 DIAGNOSIS — O35.5XX0 SUSPECTED DAMAGE TO FETUS FROM DRUGS, AFFECTING MANAGEMENT OF MOTHER, SINGLE OR UNSPECIFIED FETUS: ICD-10-CM

## 2022-07-27 DIAGNOSIS — Z3A.24 24 WEEKS GESTATION OF PREGNANCY: ICD-10-CM

## 2022-07-27 DIAGNOSIS — E66.01 MATERNAL MORBID OBESITY IN SECOND TRIMESTER, ANTEPARTUM (HCC): Primary | ICD-10-CM

## 2022-07-27 DIAGNOSIS — O99.810 ABNORMAL GLUCOSE TOLERANCE AFFECTING PREGNANCY, ANTEPARTUM: ICD-10-CM

## 2022-07-27 DIAGNOSIS — O26.872 SHORT CERVIX, SECOND TRIMESTER: ICD-10-CM

## 2022-07-27 PROBLEM — Z03.71 SUSPECTED PROBLEM WITH AMNIOTIC CAVITY AND MEMBRANE NOT FOUND: Status: ACTIVE | Noted: 2022-07-27

## 2022-07-27 LAB
AMNISURE, POC: NEGATIVE
AMPHETAMINE SCREEN, URINE: NOT DETECTED
BACTERIA: ABNORMAL /HPF
BARBITURATE SCREEN URINE: NOT DETECTED
BENZODIAZEPINE SCREEN, URINE: NOT DETECTED
BILIRUBIN URINE: NEGATIVE
BLOOD, URINE: NEGATIVE
CANNABINOID SCREEN URINE: NOT DETECTED
CLARITY: ABNORMAL
COCAINE METABOLITE SCREEN URINE: NOT DETECTED
COLOR: YELLOW
EPITHELIAL CELLS, UA: ABNORMAL /HPF
FENTANYL SCREEN, URINE: NOT DETECTED
GLUCOSE URINE, POC: NEGATIVE
GLUCOSE URINE: NEGATIVE MG/DL
KETONES, URINE: 40 MG/DL
LEUKOCYTE ESTERASE, URINE: NEGATIVE
Lab: NORMAL
Lab: NORMAL
METHADONE SCREEN, URINE: NOT DETECTED
MUCUS: PRESENT /LPF
NEGATIVE QC PASS/FAIL: NORMAL
NITRITE, URINE: NEGATIVE
OPIATE SCREEN URINE: NOT DETECTED
OXYCODONE URINE: NOT DETECTED
PH UA: 6 (ref 5–9)
PHENCYCLIDINE SCREEN URINE: NOT DETECTED
POSITIVE QC PASS/FAIL: NORMAL
PROTEIN UA: ABNORMAL MG/DL
PROTEIN UA: POSITIVE
RBC UA: ABNORMAL /HPF (ref 0–2)
SPECIFIC GRAVITY UA: >=1.03 (ref 1–1.03)
UROBILINOGEN, URINE: 0.2 E.U./DL
WBC UA: ABNORMAL /HPF (ref 0–5)

## 2022-07-27 PROCEDURE — 76817 TRANSVAGINAL US OBSTETRIC: CPT | Performed by: OBSTETRICS & GYNECOLOGY

## 2022-07-27 PROCEDURE — G0378 HOSPITAL OBSERVATION PER HR: HCPCS

## 2022-07-27 PROCEDURE — 81002 URINALYSIS NONAUTO W/O SCOPE: CPT | Performed by: OBSTETRICS & GYNECOLOGY

## 2022-07-27 PROCEDURE — G8427 DOCREV CUR MEDS BY ELIG CLIN: HCPCS | Performed by: OBSTETRICS & GYNECOLOGY

## 2022-07-27 PROCEDURE — 99213 OFFICE O/P EST LOW 20 MIN: CPT | Performed by: NURSE PRACTITIONER

## 2022-07-27 PROCEDURE — 76816 OB US FOLLOW-UP PER FETUS: CPT | Performed by: OBSTETRICS & GYNECOLOGY

## 2022-07-27 PROCEDURE — 76819 FETAL BIOPHYS PROFIL W/O NST: CPT | Performed by: OBSTETRICS & GYNECOLOGY

## 2022-07-27 PROCEDURE — G8417 CALC BMI ABV UP PARAM F/U: HCPCS | Performed by: OBSTETRICS & GYNECOLOGY

## 2022-07-27 PROCEDURE — 76820 UMBILICAL ARTERY ECHO: CPT | Performed by: OBSTETRICS & GYNECOLOGY

## 2022-07-27 PROCEDURE — 81001 URINALYSIS AUTO W/SCOPE: CPT

## 2022-07-27 PROCEDURE — 99213 OFFICE O/P EST LOW 20 MIN: CPT | Performed by: OBSTETRICS & GYNECOLOGY

## 2022-07-27 PROCEDURE — 1036F TOBACCO NON-USER: CPT | Performed by: OBSTETRICS & GYNECOLOGY

## 2022-07-27 PROCEDURE — 80307 DRUG TEST PRSMV CHEM ANLYZR: CPT

## 2022-07-27 NOTE — PROGRESS NOTES
22     RE:  Fior Babcock   : 1985   AGE: 31 y.o. REFERRING PHYSICIANs:                                               Romy Prater      Dear Jacqui Mrs. Fior Babcock a 31 y.o.  Zackary Cowan  is seen today on follow up in our office. REASON FOR APPOINTMENT:  Follow-up on a morbidly obese patient with history of first trimester exposure to suvorexant (Belsomra)    MEDICATIONS:    [place] Gummies 2/day. Unisom once per day. For treatment of morning sickness. Vitamin B6 for treatment of morning sickness. INTERVAL HISTORY:  Mrs Fior Babcock had an uneventful course of pregnancy since her last visit to our office. When seen today in our office she was complaining of leaking fluid from her vagina. PHYSICAL EXAMINATION:  General Appearance:  Healthy looking, alert, no acute distress. Eyes:     No pallor, no icterus, no photophobia. Ears:     No ear drainage. Nose:     No nasal drainage, no paranasal sinus tenderness. Throat:   Mucosa moist, no oral thrush, no exudate. Neck:     No nuchal rigidity. Back:     No CVA tenderness. Abdomen:    Soft nontender. Extremities:    No pretibial pitting edema, no calf muscle tenderness. Skin:     No rashes, no lesions. BP: 119/84 Weight: 266 lb 8 oz (120.9 kg)   Heart Rate: (!) 107     Body mass index is 43.01 kg/m². Urine dipstick:  Glucose : Negative   Albumin:  Trace       An ultrasound evaluation was done in our office today. I reviewed the ultrasound pictures stored in the hard drive of the ultrasound machine with the patient. Please refer to the enclosed copy of the ultrasound report for further information. IMPRESSION:  A  24w1d  intrauterine gestation. Morbid maternal obesity. History of anxiety and depression, not requiring treatment at present (stopped Belsomra)  First trimester exposure to Belsomra [facility]), stopped.   S/p myomectomy in  in [facility] followed by normal vaginal delivery 2013 with Dr. Chava Hooker. RECOMMENDATIONS/PLAN:  I discussed with the patient the following points: The benefits and limitations of ultrasound in prenatal diagnosis. Some defects might not always be seen by ultrasound. Estimated incidence of these defects in the general population is 2- 4%. No structural  anomalies are noted. Only genetic amniocentesis can rule out fetal chromosome anomalies. Normal ultrasound does not. The size of her baby is appropriate for gestational age. Obesity is assosiated with an increased risk of developing gestational diabetes, and disturbance in the growth of her baby, such as Large for dates and small for Dates. Gestational diabetes should be ruled out with a two hour Glucose tolerance test. The test was ordered on previous visits to our office. Not done yet. She said that she is scheduled to have it done in your office on her next visit. She was encouraged to keep the appointment. Exposure to different medications such as  Belsomra (Suvorexant), in the first trimester of pregnancy increases the likelihood of having a baby with birth defects. No structural anomalies are seen today by ultrasound . Not all birth defects, however, can be seen by ultrasound, and some defects might show on ultrasounds done later in pregnancy. Suvorexant is classified as Category C medication in pregnancy. Animal reproduction studies have shown an adverse effect on the fetus and there are no adequate and well-controlled studies in humans, but potential benefits may warrant use of the drug in pregnant women despite potential risks. She was complaining of leaking fluid from her vagina for which reason a transvaginal ultrasound evaluation was initiated by the sonographer. It showed shortening of the cervix. The membrane was however seen intact covering the internal os. Rupture of membrane is unlikely in her case.   I explained to her that as she should be checked for rupture membranes anyway by tomorrow since transvaginal ultrasound was done today. She is to go to the labor unit tomorrow to check for rupture of membranes. She should also be checked for urinary tract infection. I explained to her that most of the time pregnant ladies leak urine during pregnancy (rather than rupture of membrane). Due to the shortening of the cervix I recommend going to  She should restrict her activity. She is not to lift more than 15Lb weight, and should not be on her feet more than 2hrs per day. She should abstain from sexual relations. I also ordered progesterone vaginal suppositories 200 mg every night. Fetal well-being was confirmed today. The amount of fluid around baby is normal.  The Biophysical profile score of 6/8 is reassuring, (absent breathing movement). The umbilical artery Doppler studies are normal.  She should monitor fetal well-being at home by counting movements after dinner. Her baby should  move 10 times in 2 hours; otherwise, she should call your office immediately. She is also to call, if she develops any headaches, blurred vision, abdominal pain, bleeding, or spotting, which are signs of preeclampsia. She is to continue to follow with you in your office for ongoing obstetric care. I recommend follow-up ultrasound evaluation in our [facility] office in 2 weeks to check on fetal wellbeing anatomy and growth. Thank you again, doctor, for allowing us to be of service to your patient. If I can be of further assistance, please do not hesitate to call. Sincerely,        Nica Cedeno M.D., [facility]    The total time in minutes spent reviewing medical records, reviewing imaging studies, performing ultrasonic imaging, reviewing laboratory testing, and documenting information was over 30  minutes, of which, 50% of the time was spent in patient education, counseling, and coordinating care with the patient, her provider, and/or her family.  I answered all of her questions to her satisfaction. Current encounter billing:  [place] OFFICE OUTPATIENT VISIT LOW MDM 20-30 MINUTES [[id number]]  US OB Follow Up Transabdominal Approach [[id number] Custom]  US Fetal Biophysical Profile WO Non Stress Testing [[id number] Custom]  US Doppler Fetal Umbilical Artery [[id number] Custom]  US OB Transvaginal [[id number] Custom]      **This report has been created using voice recognition software.  It may contain minor errors     which are inherent in voice recognition technology**

## 2022-07-27 NOTE — PROGRESS NOTES
24.1  from [facility] sent for r/o ROM and urine test.   Pt states for past month felt like she was wet and leaking something. Denies VB, +nausea, +fetal movement. Denies dysuria. Pt states she was told by Dr Malika Bird that she has shortened cervix and to start on progesterone supp. ..

## 2022-07-27 NOTE — PROGRESS NOTES
Discharge instructions reviewed with pt. Verbalizes understanding, all questions answered. Pt leaves unit ambulatory with all personal belongings.

## 2022-07-27 NOTE — PATIENT INSTRUCTIONS
Please arrive for your scheduled appointment at least 15 minutes early with your actual insurance card+ a photo ID. Also if you need any refills ordered or have questions, it may take up 48 hours to reply. Please allow ample time for your refills. Call me when you use last refill. Thank you for your cooperation. Call your primary obstetrician with bleeding, leaking of fluid, abdominal tenderness, headache, blurry vision, epigastric pain and increased urinary frequency. If you are experiencing an emergency and need immediate help, call 911 or go to go emergency room or labor and delivery. if you are sick, not feeling well or have an infectious process going on please reschedule your appointment by calling [phone]. Also if any family members are not feeling well, please do not bring them to your appointment. We appreciate your cooperation. We are doing this in order to protect our pregnant mothers+ their babies. if you are sick, not feeling well or have an infectious process going on please reschedule your appointment by calling [phone]. Also if any family members are not feeling well, please do not bring them to your appointment. We appreciate your cooperation. We are doing this in order to protect our pregnant mothers+ their babies.

## 2022-07-27 NOTE — PROGRESS NOTES
Pt here for pregnancy ultrasound  Pt c/o nausea/feeling lightheaded/dizzy-patient given water and cookies and encouraged to take Zofran that was prescribed for her  Pt feeling fetal movement

## 2022-07-27 NOTE — LETTER
22     RE:  Jodell Crigler   : 1985   AGE: 31 y.o. REFERRING PHYSICIANs:                                               Salena Falling MD Lovell Gottron      Dear Jacqui Mrs. Jodell Crigler a 31 y.o.  Belem Miranda  is seen today on follow up in our office. REASON FOR APPOINTMENT:  · Follow-up on a morbidly obese patient with history of first trimester exposure to suvorexant (Belsomra)    MEDICATIONS:    [place] Gummies 2/day. Unisom once per day. For treatment of morning sickness. Vitamin B6 for treatment of morning sickness. INTERVAL HISTORY:  Mrs Jodell Crigler had an uneventful course of pregnancy since her last visit to our office. When seen today in our office she was complaining of leaking fluid from her vagina. PHYSICAL EXAMINATION:  General Appearance:  Healthy looking, alert, no acute distress. Eyes:     No pallor, no icterus, no photophobia. Ears:     No ear drainage. Nose:     No nasal drainage, no paranasal sinus tenderness. Throat:   Mucosa moist, no oral thrush, no exudate. Neck:     No nuchal rigidity. Back:     No CVA tenderness. Abdomen:    Soft nontender. Extremities:    No pretibial pitting edema, no calf muscle tenderness. Skin:     No rashes, no lesions. BP: 119/84 Weight: 266 lb 8 oz (120.9 kg)   Heart Rate: (!) 107     Body mass index is 43.01 kg/m². Urine dipstick:  Glucose : Negative   Albumin:  Trace       An ultrasound evaluation was done in our office today. I reviewed the ultrasound pictures stored in the hard drive of the ultrasound machine with the patient. Please refer to the enclosed copy of the ultrasound report for further information. IMPRESSION:  1. A  24w1d  intrauterine gestation. 2.  Morbid maternal obesity. 3. History of anxiety and depression, not requiring treatment at present (stopped Belsomra)  4. First trimester exposure to Belsomra [facility]), stopped.   5. S/p myomectomy in  in [facility], followed by normal vaginal delivery 2013 with Dr. Doreen Bhakta. RECOMMENDATIONS/PLAN:  I discussed with the patient the following points:    1. The benefits and limitations of ultrasound in prenatal diagnosis. Some defects might not always be seen by ultrasound. Estimated incidence of these defects in the general population is 2- 4%. 2. No structural  anomalies are noted. Only genetic amniocentesis can rule out fetal chromosome anomalies. Normal ultrasound does not. 3. The size of her baby is appropriate for gestational age. 4. Obesity is assosiated with an increased risk of developing gestational diabetes, and disturbance in the growth of her baby, such as Large for dates and small for Dates. Gestational diabetes should be ruled out with a two hour Glucose tolerance test. The test was ordered on previous visits to our office. Not done yet. She said that she is scheduled to have it done in your office on her next visit. She was encouraged to keep the appointment. 5. Exposure to different medications such as  Belsomra (Suvorexant), in the first trimester of pregnancy increases the likelihood of having a baby with birth defects. No structural anomalies are seen today by ultrasound . Not all birth defects, however, can be seen by ultrasound, and some defects might show on ultrasounds done later in pregnancy. 6. Suvorexant is classified as Category C medication in pregnancy. Animal reproduction studies have shown an adverse effect on the fetus and there are no adequate and well-controlled studies in humans, but potential benefits may warrant use of the drug in pregnant women despite potential risks. 7. She was complaining of leaking fluid from her vagina for which reason a transvaginal ultrasound evaluation was initiated by the sonographer. It showed shortening of the cervix. The membrane was however seen intact covering the internal os. Rupture of membrane is unlikely in her case.   I explained to her that as she should be checked for rupture membranes anyway by tomorrow since transvaginal ultrasound was done today. She is to go to the labor unit tomorrow to check for rupture of membranes. She should also be checked for urinary tract infection. I explained to her that most of the time pregnant ladies leak urine during pregnancy (rather than rupture of membrane). 8. Due to the shortening of the cervix I recommend going to  She should restrict her activity. She is not to lift more than 15Lb weight, and should not be on her feet more than 2hrs per day. She should abstain from sexual relations. 9. I also ordered progesterone vaginal suppositories 200 mg every night. 10. Fetal well-being was confirmed today. The amount of fluid around baby is normal.  The Biophysical profile score of 6/8 is reassuring, (absent breathing movement). The umbilical artery Doppler studies are normal.  11. She should monitor fetal well-being at home by counting movements after dinner. Her baby should  move 10 times in 2 hours; otherwise, she should call your office immediately. She is also to call, if she develops any headaches, blurred vision, abdominal pain, bleeding, or spotting, which are signs of preeclampsia. 12. She is to continue to follow with you in your office for ongoing obstetric care. 13. I recommend follow-up ultrasound evaluation in our [facility] office in 2 weeks to check on fetal wellbeing anatomy and growth. Thank you again, doctor, for allowing us to be of service to your patient. If I can be of further assistance, please do not hesitate to call.       Sincerely,        Jeremias Nazario M.D., Lilly Ayala    The total time in minutes spent reviewing medical records, reviewing imaging studies, performing ultrasonic imaging, reviewing laboratory testing, and documenting information was over 30  minutes, of which, 50% of the time was spent in patient education, counseling, and coordinating care with the patient, her provider, and/or her family. I answered all of her questions to her satisfaction. Current encounter billing:  VT OFFICE OUTPATIENT VISIT LOW MDM 20-30 MINUTES [[id number]]  US OB Follow Up Transabdominal Approach [[id number] Custom]  US Fetal Biophysical Profile WO Non Stress Testing [67598 Custom]  US Doppler Fetal Umbilical Artery [[id number] Custom]  US OB Transvaginal [[id number] Custom]      **This report has been created using voice recognition software.  It may contain minor errors     which are inherent in voice recognition technology**

## 2022-07-27 NOTE — TELEPHONE ENCOUNTER
[facility] @ [address] given verbal order for Progesterone supp 200mg Q night 30 supp and 4 refills.   He states he will notifiy pt

## 2022-07-27 NOTE — DISCHARGE INSTRUCTIONS
Home Undelivered Discharge Instructions    After Discharge Orders:    Future Appointments   Date Time Provider Navi Francis   8/16/2022  9:00 AM PINKY Nair - CNP [facility]                     Diet:  normal diet as tolerated    Rest: normal activity as tolerated    Other instructions: Do kick counts once a day on your baby. Choose the time of day your baby is most active. Get in a comfortable lying or sitting position and time how long it takes to feel 10 kicks, twists, turns, swishes, or rolls.  Call your physician or midwife if there have not been 10 kicks in 2 hours    Call physician or midwife, return to Labor and Delivery, call 911, or go to the nearest Emergency Room if: increased leakage or fluid, contractions more than  6 per  1 hour, decreased fetal movement, persistent low back pain or cramping, bleeding from vaginal area, difficulty urinating, pain with urination, difficulty breathing, new calf pain, persistent headache, or vision change

## 2022-07-27 NOTE — H&P
Department of Obstetrics and Gynecology  Labor and Delivery  Nurse practitioner Triage Note      SUBJECTIVE:  Fito Waite is a 31 y.o. female, ,  Patient's last menstrual period was 2022 (approximate). ,Estimated Date of Delivery: 11/15/22, 24w1d, sent from [facility] office for rule out rupture of membranes and UTI. Pt reports lof. She denies vb, decreased fm, or regular ctx. Pregnancy complicated with short cervix, she is to start progesterone suppositories and activity restriction.      Prenatal course: stated above    MEDICAL HISTORY:      Diagnosis Date    Abnormal Pap smear of cervix     Complication of anesthesia     hard to wake up    Depression        SURGICAL HISTORY:      Procedure Laterality Date    APPENDECTOMY      LAPAROSCOPY      fibroids removed    TONSILLECTOMY      WISDOM TOOTH EXTRACTION         FAMILY HISTORY      Problem Relation Age of Onset    Asthma Mother     Hypertension Mother     Heart Attack Father     Hypertension Father        Medication prior to Admission:  Medications Prior to Admission: Progesterone 200 MG SUPP, Place 200 mg vaginally nightly (Patient not taking: Reported on 2022)  ondansetron (ZOFRAN) 4 MG tablet, Take 1 tablet by mouth every 8 hours as needed for Nausea or Vomiting  doxyLAMINE succinate (GNP SLEEP AID) 25 MG tablet, Take by mouth nightly  vitamin B-6 (PYRIDOXINE) 50 MG tablet, Take 50 mg by mouth daily  Prenatal MV & Min w/FA-DHA (PRENATAL GUMMIES) 0.18-25 MG CHEW, Take 1 tablet by mouth daily  acetaminophen (TYLENOL) 325 MG tablet, Take 650 mg by mouth every 6 hours as needed for Pain     ALLERGIES:  Lidocaine, Bactrim [sulfamethoxazole-trimethoprim], Procaine hcl, and Quaternium-15    Review of Systems:   Ears, nose, mouth, throat, and face: negative  Respiratory: negative  Cardiovascular: negative  Gastrointestinal: negative  Genitourinary:negative  Integument/breast: negative  Hematologic/lymphatic: negative  Musculoskeletal:negative  Neurological: negative  Behavioral/Psych: negative  Endocrine: negative  Allergic/Immunologic: negative    OBJECTIVE    Vitals:  LMP 01/01/2022 (Approximate)       NECK:  Supple, symmetrical, trachea midline, no adenopathy, thyroid symmetric, not enlarged and no tenderness, skin normal  LUNGS:  No increased work of breathing, good air exchange, clear to auscultation bilaterally, no crackles or wheezing  CARDIOVASCULAR:  normal S1 and S2  ABDOMEN:  soft, nontender    Cervix:           deferred    Membranes:  Intact (amnisure negative)    Fetal heart rate:         Baseline Heart Rate:  155        Accelerations:  present       Decelerations: absent       Variability:  moderate    Contraction frequency: 0 minutes        General Labs:    CBC:   Lab Results   Component Value Date/Time    WBC 9.1 12/17/2018 06:04 AM    RBC 4.11 12/17/2018 06:04 AM    HGB 11.7 12/17/2018 06:04 AM    HCT 37.2 12/17/2018 06:04 AM    MCV 90.5 12/17/2018 06:04 AM    RDW 14.0 12/17/2018 06:04 AM     12/17/2018 06:04 AM     CMP:    Lab Results   Component Value Date/Time     12/17/2018 06:04 AM    K 4.1 12/17/2018 06:04 AM     12/17/2018 06:04 AM    CO2 26 12/17/2018 06:04 AM    BUN 10 12/17/2018 06:04 AM    PROT 6.6 12/17/2018 06:04 AM     U/A:  No components found for: Ottie Danger, USPGRAV, UPH, UPROTEIN, UGLUCOSE, UKETONE, UBILI, UBLOOD, UNITRITE, UUROBIL, ULEUKEST, USQEPI, URENEPI, UWBC, URBC, UBACTERIA, UHYALINE        ASSESSMENT & PLAN:   Orders per m  Amnisure negative  Urinalysis pending  Update ob with results

## 2022-08-30 NOTE — PROGRESS NOTES
Diabetes Education scheduled for 8/31/22 at 10:00 am. 2400 calorie carbohydrate consistent meal plan recommended.     Anca Nelson MS, RD, LD

## 2022-08-31 ENCOUNTER — ROUTINE PRENATAL (OUTPATIENT)
Dept: OBGYN CLINIC | Age: 37
End: 2022-08-31
Payer: COMMERCIAL

## 2022-08-31 ENCOUNTER — ANCILLARY PROCEDURE (OUTPATIENT)
Dept: OBGYN CLINIC | Age: 37
End: 2022-08-31
Payer: COMMERCIAL

## 2022-08-31 ENCOUNTER — HOSPITAL ENCOUNTER (OUTPATIENT)
Dept: DIABETES SERVICES | Age: 37
Setting detail: THERAPIES SERIES
Discharge: HOME | End: 2022-08-31
Payer: COMMERCIAL

## 2022-08-31 VITALS
BODY MASS INDEX: 43.94 KG/M2 | SYSTOLIC BLOOD PRESSURE: 126 MMHG | DIASTOLIC BLOOD PRESSURE: 78 MMHG | HEART RATE: 108 BPM | WEIGHT: 272.25 LBS

## 2022-08-31 DIAGNOSIS — O99.212 MATERNAL MORBID OBESITY IN SECOND TRIMESTER, ANTEPARTUM (HCC): Primary | ICD-10-CM

## 2022-08-31 DIAGNOSIS — Z3A.29 29 WEEKS GESTATION OF PREGNANCY: ICD-10-CM

## 2022-08-31 DIAGNOSIS — E66.01 MATERNAL MORBID OBESITY IN SECOND TRIMESTER, ANTEPARTUM (HCC): Primary | ICD-10-CM

## 2022-08-31 DIAGNOSIS — O35.5XX0 SUSPECTED DAMAGE TO FETUS FROM DRUGS, AFFECTING MANAGEMENT OF MOTHER, SINGLE OR UNSPECIFIED FETUS: ICD-10-CM

## 2022-08-31 DIAGNOSIS — O24.410 DIET CONTROLLED GESTATIONAL DIABETES MELLITUS (GDM) IN THIRD TRIMESTER: ICD-10-CM

## 2022-08-31 DIAGNOSIS — O26.872 SHORT CERVIX, SECOND TRIMESTER: ICD-10-CM

## 2022-08-31 LAB
GLUCOSE URINE, POC: NEGATIVE
PROTEIN UA: POSITIVE

## 2022-08-31 PROCEDURE — 99213 OFFICE O/P EST LOW 20 MIN: CPT | Performed by: OBSTETRICS & GYNECOLOGY

## 2022-08-31 PROCEDURE — G0108 DIAB MANAGE TRN  PER INDIV: HCPCS

## 2022-08-31 PROCEDURE — G8427 DOCREV CUR MEDS BY ELIG CLIN: HCPCS | Performed by: OBSTETRICS & GYNECOLOGY

## 2022-08-31 PROCEDURE — 81002 URINALYSIS NONAUTO W/O SCOPE: CPT | Performed by: OBSTETRICS & GYNECOLOGY

## 2022-08-31 PROCEDURE — 1036F TOBACCO NON-USER: CPT | Performed by: OBSTETRICS & GYNECOLOGY

## 2022-08-31 PROCEDURE — 76819 FETAL BIOPHYS PROFIL W/O NST: CPT | Performed by: OBSTETRICS & GYNECOLOGY

## 2022-08-31 PROCEDURE — 76816 OB US FOLLOW-UP PER FETUS: CPT | Performed by: OBSTETRICS & GYNECOLOGY

## 2022-08-31 PROCEDURE — G8417 CALC BMI ABV UP PARAM F/U: HCPCS | Performed by: OBSTETRICS & GYNECOLOGY

## 2022-08-31 PROCEDURE — 76820 UMBILICAL ARTERY ECHO: CPT | Performed by: OBSTETRICS & GYNECOLOGY

## 2022-08-31 NOTE — PROGRESS NOTES
Pt here for ultrasound  Pt just started to take blood sugars yesterday and instructed to write down starting tomorrow and bring to office  Pt went to diabetic education today  Pt c/o cramping and pressure and having difficulty with right hip and back pain  Pt states good fetal movement

## 2022-08-31 NOTE — PROGRESS NOTES
22     RE:  Fito Waite   : 1985   AGE: 31 y.o. REFERRING PHYSICIANs:                                               Mahala Myers MD Leatha Paget      Dear Jacqui Mrs. Fito Waite a 31 y.o.  Albaro Bryson  is seen today on follow up in our office. REASON FOR APPOINTMENT:  Follow-up on a morbidly obese patient with gestational diabetes, history of first trimester exposure to suvorexant (Belsomra)    MEDICATIONS:    [place] Gummies 2/day. Unisom once per day. For treatment of morning sickness. Vitamin B6 for treatment of morning sickness. Progesterone vaginal suppositories 200 mg at bedtime daily. Iron supplement. INTERVAL HISTORY:  On Her last visit to our office, Mrs. Davon Feliciano:   was complaining of leaking fluid from her vagina she was sent to the labor unit of [facility] in [facility] rupture of membrane was ruled out she was reassured. Was found to have shortened cervix she was placed on restricted activity. Was diagnosed to have gestational diabetes 2 days ago. She started testing her sugars yesterday. She is scheduled to meet with the diabetic educator to initiate ADA diet today. When seen today in our office she had no complaints. She reported good fetal movements. She denied having any headache blurred vision or abdominal pain. PHYSICAL EXAMINATION:  General Appearance:  Healthy looking, alert, no acute distress. Eyes:     No pallor, no icterus, no photophobia. Ears:     No ear drainage. Nose:     No nasal drainage, no paranasal sinus tenderness. Throat:   Mucosa moist, no oral thrush, no exudate. Neck:     No nuchal rigidity. Back:     No CVA tenderness. Abdomen:    Soft nontender. Extremities:    No pretibial pitting edema, no calf muscle tenderness. Skin:     No rashes, no lesions. BP: 126/78 Weight: 272 lb 4 oz (123.5 kg)   Heart Rate: (!) 108     Body mass index is 43.94 kg/m².   Urine dipstick:  Glucose : Negative   Albumin:  Trace       An ultrasound evaluation was done in our office today. I reviewed the ultrasound pictures stored in the hard drive of the ultrasound machine with the patient. Please refer to the enclosed copy of the ultrasound report for further information. IMPRESSION:  A  29w1d  intrauterine gestation. Gestational diabetes. Short cervix on treatment with vaginal suppositories of progesterone. Morbid maternal obesity. History of anxiety and depression, not requiring treatment at present (stopped Belsomra)  First [facility] stopped. S/p myomectomy in  in [place], followed by normal vaginal delivery  with Dr. Milagros Ellis. RECOMMENDATIONS/PLAN:  I discussed with the patient the following points: The benefits and limitations of ultrasound in prenatal diagnosis. Some defects might not always be seen by ultrasound. Estimated incidence of these defects in the general population is 2- 4%. No structural  anomalies are noted. Only genetic amniocentesis can rule out fetal chromosome anomalies. Normal ultrasound does not. The size of her baby is appropriate for gestational age. The diagnosis of gestational diabetes was made. Her sugars should be well-controlled. Her fasting should be kept under 92, 2-hour postprandial should be kept under 120 mg/dl. She is to be started on an ADA diet as soon as possible. I explained to her that failure to control her sugar will result in an increased risk of developing  complications such as delayed maturation of the lungs, electrolyte imbalance, seizures, and jaundice. There is also an increased risk of delivering prematurely and an increased risk of having a large for date baby. Exposure to different medications such as  Belsomra (Suvorexant), in the first trimester of pregnancy increases the likelihood of having a baby with birth defects.  No structural anomalies are seen today by ultrasound . Not all birth defects, however, can be seen by ultrasound, and some defects might show on ultrasounds done later in pregnancy. Suvorexant is classified as Category C medication in pregnancy. Animal reproduction studies have shown an adverse effect on the fetus and there are no adequate and well-controlled studies in humans, but potential benefits may warrant use of the drug in pregnant women despite potential risks. She should continue to restrict her activity. She is not to lift more than 15Lb weight, and should not be on her feet more than 2hrs per day. She should abstain from sexual relations. I she should continue treatment with vaginal suppositories 200 mg every night. Fetal well-being was confirmed today. The amount of fluid around baby is normal.  The Biophysical profile score of 8/8 is reassuring. The umbilical artery dopplers showed elevated S/D ratio. There was no absences or reversal of end diastolic flow. She should monitor fetal well-being at home by counting movements after dinner. Her baby should  move 10 times in 2 hours; otherwise, she should call your office immediately. She is also to call, if she develops any headaches, blurred vision, abdominal pain, bleeding, or spotting, which are signs of preeclampsia. She is to continue to follow with you in your office for ongoing obstetric care. I recommend follow-up ultrasound evaluation in our [facility] office in 2 weeks to check on sugar control and on fetal wellbeing nd growth. Thank you again, doctor, for allowing us to be of service to your patient. If I can be of further assistance, please do not hesitate to call.       Sincerely,        Yadira Thomas M.D., [facility]    The total time in minutes spent reviewing medical records, reviewing imaging studies, performing ultrasonic imaging, reviewing laboratory testing, and documenting information was  30  minutes, of which, 50% of the time was spent in patient education, counseling, and coordinating care with the patient, her provider, and/or her family. I answered all of her questions to her satisfaction. Current encounter billing:  DE OFFICE OUTPATIENT VISIT LOW MDM 20-30 MINUTES [[id number]]  US OB Follow Up Transabdominal Approach [SVD627 Custom]  US Fetal Biophysical Profile WO Non Stress Testing [97955 Custom]  US Doppler Fetal Umbilical Artery [[id number] Custom]    **This report has been created using voice recognition software.  It may contain minor errors     which are inherent in voice recognition technology**

## 2022-08-31 NOTE — LETTER
22     RE:  Debra Jonas   : 1985   AGE: 31 y.o. REFERRING PHYSICIANs:                                               Florentino Hawkins      Dear Jacqui Mrs. Debra Jonas a 31 y.o.  Jolie Ruiz  is seen today on follow up in our office. REASON FOR APPOINTMENT:  · Follow-up on a morbidly obese patient with gestational diabetes, history of first trimester exposure to suvorexant (Belsomra)    MEDICATIONS:    [place] Gummies 2/day. Unisom once per day. For treatment of morning sickness. Vitamin B6 for treatment of morning sickness. Progesterone vaginal suppositories 200 mg at bedtime daily. Iron supplement. INTERVAL HISTORY:  On Her last visit to our office, Mrs. Yadira Nelson:  Ewa Friedman  was complaining of leaking fluid from her vagina she was sent to the labor unit of [facility] in [facility] rupture of membrane was ruled out she was reassured.  Was found to have shortened cervix she was placed on restricted activity.  Was diagnosed to have gestational diabetes 2 days ago. She started testing her sugars yesterday. She is scheduled to meet with the diabetic educator to initiate ADA diet today. When seen today in our office she had no complaints. She reported good fetal movements. She denied having any headache blurred vision or abdominal pain. PHYSICAL EXAMINATION:  General Appearance:  Healthy looking, alert, no acute distress. Eyes:     No pallor, no icterus, no photophobia. Ears:     No ear drainage. Nose:     No nasal drainage, no paranasal sinus tenderness. Throat:   Mucosa moist, no oral thrush, no exudate. Neck:     No nuchal rigidity. Back:     No CVA tenderness. Abdomen:    Soft nontender. Extremities:    No pretibial pitting edema, no calf muscle tenderness. Skin:     No rashes, no lesions. BP: 126/78 Weight: 272 lb 4 oz (123.5 kg)   Heart Rate: (!) 108     Body mass index is 43.94 kg/m².   Urine dipstick:  Glucose : Negative   Albumin:  Trace       An ultrasound evaluation was done in our office today. I reviewed the ultrasound pictures stored in the hard drive of the ultrasound machine with the patient. Please refer to the enclosed copy of the ultrasound report for further information. IMPRESSION:  1. A  29w1d  intrauterine gestation. 2. Gestational diabetes. 3. Short cervix on treatment with vaginal suppositories of progesterone. 4.  Morbid maternal obesity. 5. History of anxiety and depression, not requiring treatment at present (stopped Belsomra)  6. First trimester exposure to Belsomra [facility]), stopped. 7. S/p myomectomy in  in [place], followed by normal vaginal delivery  with Dr. Ginny Huff. RECOMMENDATIONS/PLAN:  I discussed with the patient the following points:    1. The benefits and limitations of ultrasound in prenatal diagnosis. Some defects might not always be seen by ultrasound. Estimated incidence of these defects in the general population is 2- 4%. 2. No structural  anomalies are noted. Only genetic amniocentesis can rule out fetal chromosome anomalies. Normal ultrasound does not. 3. The size of her baby is appropriate for gestational age. 4. The diagnosis of gestational diabetes was made. Her sugars should be well-controlled. Her fasting should be kept under 92, 2-hour postprandial should be kept under 120 mg/dl. She is to be started on an ADA diet as soon as possible. I explained to her that failure to control her sugar will result in an increased risk of developing  complications such as delayed maturation of the lungs, electrolyte imbalance, seizures, and jaundice. There is also an increased risk of delivering prematurely and an increased risk of having a large for date baby.    5. Exposure to different medications such as  Belsomra (Suvorexant), in the first trimester of pregnancy increases the likelihood of having a baby with birth defects. No structural anomalies are seen today by ultrasound . Not all birth defects, however, can be seen by ultrasound, and some defects might show on ultrasounds done later in pregnancy. 6. Suvorexant is classified as Category C medication in pregnancy. Animal reproduction studies have shown an adverse effect on the fetus and there are no adequate and well-controlled studies in humans, but potential benefits may warrant use of the drug in pregnant women despite potential risks. 7. She should continue to restrict her activity. She is not to lift more than 15Lb weight, and should not be on her feet more than 2hrs per day. She should abstain from sexual relations. 8. I she should continue treatment with vaginal suppositories 200 mg every night. 9. Fetal well-being was confirmed today. The amount of fluid around baby is normal.  The Biophysical profile score of 8/8 is reassuring. 10. The umbilical artery dopplers showed elevated S/D ratio. There was no absences or reversal of end diastolic flow. 11. She should monitor fetal well-being at home by counting movements after dinner. Her baby should  move 10 times in 2 hours; otherwise, she should call your office immediately. She is also to call, if she develops any headaches, blurred vision, abdominal pain, bleeding, or spotting, which are signs of preeclampsia. 12. She is to continue to follow with you in your office for ongoing obstetric care. 13. I recommend follow-up ultrasound evaluation in our [facility] office in 2 weeks to check on sugar control and on fetal wellbeing nd growth. Thank you again, doctor, for allowing us to be of service to your patient. If I can be of further assistance, please do not hesitate to call.       Sincerely,        Presley Bell M.D., [address] [facility]    The total time in minutes spent reviewing medical records, reviewing imaging studies, performing ultrasonic imaging, reviewing laboratory testing, and documenting information was  27 minutes, of which, 50% of the time was spent in patient education, counseling, and coordinating care with the patient, her provider, and/or her family. I answered all of her questions to her satisfaction. Current encounter billing:  ID OFFICE OUTPATIENT VISIT LOW MDM 20-30 MINUTES [[id number]]  US OB Follow Up Transabdominal Approach [[id number] Custom]  US Fetal Biophysical Profile WO Non Stress Testing [98628 Custom]  US Doppler Fetal Umbilical Artery [[id number] Custom]    **This report has been created using voice recognition software.  It may contain minor errors     which are inherent in voice recognition technology**

## 2022-08-31 NOTE — PROGRESS NOTES
Diabetes Self-Management Education Record    Participant Name: Alannah Jarvis  Referring Provider: Lucy Anthony MD  Assessment/Evaluation Ratings:  1=Needs Instruction   4=Demonstrates Understanding/Competency  2=Needs Review   NC=Not Covered    3=Comprehends Key Points  N/A=Not Applicable  Topics/Learning Objectives Pre-session Assess Date:  Instructor initials/date   Instr. Date  8/31/22 PC  Instructor initials/date Follow-up Post- session Eval Comments   Diabetes disease process & Treatment process:   -Define type of diabetes in simple terms.  - Describe the ABCs of  diabetes management  -Identify own type of diabetes  -Identify lifestyle changes/treatment options  -other:  1 [] All     [x]  []  [x]  []  []  3 8/31/22 PC  Pt with GDM  Family hx with father  Due date 11/9/22    Developing strategies for Healthy coping/psychosocial issues:    -Describe feelings about living with diabetes  -Identify coping strategies and sources of stress  -Identify support needed & support network available  -Complete PAID-5 Diabetes questionnaire NA [] All     []  []  []    []              Prevention, detection & treatment of Chronic complications:    -Identify the prevention, detection and treatment for complications including immunizations, preventive eye, foot, dental and renal exams as indicated per the participant's duration of diabetes and health status.  -Define the natural course of diabetes and the relationship of blood glucose levels to long term complications of diabetes.   NA [] All     []            []      Prevention, detection & treatment of acute complications:    -State the causes,signs & symptoms of hyper & hypoglycemia, and prevention & treatment strategies.   -Describe sick day guidelines  DKA /indications for ketone testing &  when to call physician  NA [] All     []      []     8/31/22 PC  Denies             -Identify severe weather/situation crisis  & diabetes supplies management  []      Using medications safely:   -State effects of diabetes medicines on blood glucose levels;  -List diabetes medication taken, action & side effects NA [] All     []  []   8/31/22 PC  No DM medications at this time  To call if Dr starts medication   Insulin/Injectables/glucagon  -Name appropriate injection sites; proper storage; supplies needed;  NA   []       Demonstrate proper technique  []      Monitoring blood glucose, interpreting and using results:   -Identify the purpose of testing   -Identify recommended & personal blood glucose targets & HgbA1C target levels  -State the Importance of logging blood glucose levels for pattern recognition;   -State benefits of reading/using pt generated health data  -Verbalize safe lancet disposal 2 [x] All     []  []    []  []  []  3 8/31/22 PC  Pt already testing . Goals reviewed and to call Dr if over goal  Log sheets given and to take to Dr visits.   -Demonstrate proper testing technique  []      Incorporating physical activity into lifestyle:   -State effect of exercise on blood glucose levels;   -State benefits of regular exercise;   -Define safety considerations/food choices if needed.  -Describe contraindications/maintenance of activity.  1 [] All     []  []    []  []   8/31/22 PC  On bed rest  To follow Dr orders for activity   Incorporating nutritional management into lifestyle:   -Describe effect of type, amount & timing of food on blood glucose  -Describe methods for preparing and planning   healthy meals  -Correctly read food labels for nutritional values  -Name 3 foods high in Carbohydrate  -Plan a sample 4 carbohydrate-controlled meal using Diabetes Plate Method  -Verbalized ability to measure and count carbohydrate gram servings using food labels and carbohydrate food list.    -Plan a carbohydrate-controlled meal based on individual needs/preferences from a Registered Dietitian.   1 [] All       [x]    [x]    [x]  []      [x]        []  3   8/31/22 PC  RD was not available at the time of appointment. RD did provide guideance for appropriate meal plan for her to follow and this was followed, written out and explained to pt. She was able to verbal basic concepts of prenatal nutrition and importance of diet in pregnancy. She is nauseated \"daily\" and reviewed intervention she could try and to let her Dr. Farrah Ly. Prevention of food-borne illness and to avoid saccharin while pregnant reviewed. Reviewed 2400 kcal meal plan with rationale. Pt able to verbalize snacks and meals she could eat. She will be breastfeeding and need to continue meal plan reviewed. Reviewed importance of fiber and fluids while pregnant. Able to read food label to portion CHO. Fats and Protein also reviewed. Gave her a Tri-fold with CHO, Fats, and Protein portions. CHO distribution: 3/2/3/2/4/2  Pt verbalizes understanding of meal plan and knows to call for any questions. Has contact information. PC                 Developing strategies for problem solving to promote health/change behavior. -Identify 7 self-care behaviors; Personal health risk factors; Benefits, challenges & strategies for behavioral change and set an individualized goal selection.  1       []  3  8/31/22 PC  [x]Nutrition  [x]Monitoring  []Exercise  []Medication  []Other     Identified Barriers to learning/adherence to self management plan:    None  []  other    Instruction Method:  Lecture/Discussion and Handouts    Supporting Education Materials/Equipment Provided: Gestational Pathway Booklet   []Hebrew materials       [] services     []Other:      Encounter Type Date Attended Start Time End Time Comments No Show Dates   Assessment          Session 1         Session 2        1:1 DSMES          In person Follow-up         Gestational Diabetes 8/31/22 PC 1000 1100      Individual MNT        Meter Instrx        Insulin Instrx           Additional Comments: [] Pt seen individually due to Covid-19 Safety precautions and no group session available.         Date:   Follow-up goal attainment based on patients initial DSMES goal    Dr Notified by [] EMR []Fax        []Post class Hgb A1C  []Medication compliance   []Plate method/meal plan compliance   []Able to state the number of Carbohydrate servings eaten at B,L,D   []Testing blood glucose as prescribed by PCP   []Exercise Routine   []Other:   []Other:     []Patient lost to follow-up  Dr Notified by []EMR []Fax

## 2022-08-31 NOTE — PATIENT INSTRUCTIONS
Please arrive for your scheduled appointment at least 15 minutes early with your actual insurance card+ a photo ID. Also if you need any refills ordered or have questions, it may take up 48 hours to reply. Please allow ample time for your refills. Call me when you use last refill. Thank you for your cooperation. if you are sick, not feeling well or have an infectious process going on please reschedule your appointment by calling [phone]. Also if any family members are not feeling well, please do not bring them to your appointment. We appreciate your cooperation. We are doing this in order to protect our pregnant mothers+ their babies. if you are sick, not feeling well or have an infectious process going on please reschedule your appointment by calling [phone]. Also if any family members are not feeling well, please do not bring them to your appointment. We appreciate your cooperation. We are doing this in order to protect our pregnant mothers+ their babies.

## 2022-09-13 ENCOUNTER — ANCILLARY PROCEDURE (OUTPATIENT)
Dept: OBGYN CLINIC | Age: 37
End: 2022-09-13
Payer: COMMERCIAL

## 2022-09-13 ENCOUNTER — ROUTINE PRENATAL (OUTPATIENT)
Dept: OBGYN CLINIC | Age: 37
End: 2022-09-13
Payer: COMMERCIAL

## 2022-09-13 VITALS
BODY MASS INDEX: 43.5 KG/M2 | HEART RATE: 104 BPM | DIASTOLIC BLOOD PRESSURE: 77 MMHG | WEIGHT: 269.5 LBS | SYSTOLIC BLOOD PRESSURE: 113 MMHG

## 2022-09-13 DIAGNOSIS — E66.01 MATERNAL MORBID OBESITY IN SECOND TRIMESTER, ANTEPARTUM (HCC): ICD-10-CM

## 2022-09-13 DIAGNOSIS — O24.414 INSULIN CONTROLLED GESTATIONAL DIABETES MELLITUS (GDM) IN THIRD TRIMESTER: Primary | ICD-10-CM

## 2022-09-13 DIAGNOSIS — Z3A.31 31 WEEKS GESTATION OF PREGNANCY: ICD-10-CM

## 2022-09-13 DIAGNOSIS — O35.5XX0 SUSPECTED DAMAGE TO FETUS FROM DRUGS, AFFECTING MANAGEMENT OF MOTHER, SINGLE OR UNSPECIFIED FETUS: ICD-10-CM

## 2022-09-13 DIAGNOSIS — O26.872 SHORT CERVIX, SECOND TRIMESTER: ICD-10-CM

## 2022-09-13 DIAGNOSIS — O99.212 MATERNAL MORBID OBESITY IN SECOND TRIMESTER, ANTEPARTUM (HCC): ICD-10-CM

## 2022-09-13 LAB
GLUCOSE URINE, POC: NEGATIVE
PROTEIN UA: POSITIVE

## 2022-09-13 PROCEDURE — 81002 URINALYSIS NONAUTO W/O SCOPE: CPT | Performed by: OBSTETRICS & GYNECOLOGY

## 2022-09-13 PROCEDURE — 1036F TOBACCO NON-USER: CPT | Performed by: OBSTETRICS & GYNECOLOGY

## 2022-09-13 PROCEDURE — 76815 OB US LIMITED FETUS(S): CPT | Performed by: OBSTETRICS & GYNECOLOGY

## 2022-09-13 PROCEDURE — G8427 DOCREV CUR MEDS BY ELIG CLIN: HCPCS | Performed by: OBSTETRICS & GYNECOLOGY

## 2022-09-13 PROCEDURE — G8417 CALC BMI ABV UP PARAM F/U: HCPCS | Performed by: OBSTETRICS & GYNECOLOGY

## 2022-09-13 PROCEDURE — 76819 FETAL BIOPHYS PROFIL W/O NST: CPT | Performed by: OBSTETRICS & GYNECOLOGY

## 2022-09-13 PROCEDURE — 76820 UMBILICAL ARTERY ECHO: CPT | Performed by: OBSTETRICS & GYNECOLOGY

## 2022-09-13 PROCEDURE — 99213 OFFICE O/P EST LOW 20 MIN: CPT | Performed by: OBSTETRICS & GYNECOLOGY

## 2022-09-13 RX ORDER — INSULIN GLARGINE 100 [IU]/ML
6 INJECTION, SOLUTION SUBCUTANEOUS NIGHTLY
Qty: 5 ADJUSTABLE DOSE PRE-FILLED PEN SYRINGE | Refills: 3 | Status: ON HOLD
Start: 2022-09-13 | End: 2022-11-04 | Stop reason: HOSPADM

## 2022-09-13 NOTE — PROGRESS NOTES
22     RE:  Kenan Goodman   : 1985   AGE: 31 y.o. REFERRING PHYSICIANs:                                               Jj Kelly      Dear Jacqui Mrs. Kenan Goodman a 31 y.o.  Sana Deras  is seen today on follow up in our office. REASON FOR APPOINTMENT:  Follow-up on a morbidly obese patient with gestational diabetes, history of first trimester exposure to suvorexant (Belsomra)    MEDICATIONS:    [place] Gummies 2/day. Unisom once per day for treatment of morning sickness. Vitamin B6 for treatment of morning sickness. Progesterone vaginal suppositories 200 mg at bedtime daily. Iron supplement. INTERVAL HISTORY:  Mrs Kenan Goodman had an uneventful course of pregnancy since her last visit to our office. When seen today in our office she had no complaints. PHYSICAL EXAMINATION:  General Appearance:  Healthy looking, alert, no acute distress. Eyes:     No pallor, no icterus, no photophobia. Ears:     No ear drainage. Nose:     No nasal drainage, no paranasal sinus tenderness. Throat:   Mucosa moist, no oral thrush, no exudate. Neck:     No nuchal rigidity. Back:     No CVA tenderness. Abdomen:    Soft nontender. Extremities:    No pretibial pitting edema, no calf muscle tenderness. Skin:     No rashes, no lesions. BP: 113/77 Weight: 269 lb 8 oz (122.2 kg)   Heart Rate: (!) 104     Body mass index is 43.5 kg/m². Urine dipstick:  Glucose : Negative   Albumin:  Trace       An ultrasound evaluation was done in our office today. I reviewed the ultrasound pictures stored in the hard drive of the ultrasound machine with the patient. Please refer to the enclosed copy of the ultrasound report for further information.     Chart and Lab Work Review:  Review of her log book shows:  Elevated fasting sugars (above 92 mg/dl)  Adequate post prandial sugar control (her 2hr pp are under 120 mg/dl)    IMPRESSION:  A  31w0d  intrauterine gestation. Gestational diabetes. Short cervix on treatment with vaginal suppositories of progesterone. Morbid maternal obesity. History of anxiety and depression, not requiring treatment at present (stopped Belsomra)  First trimester exposure to Belsomra [facility]), stopped. S/p myomectomy in  in [place], followed by normal vaginal delivery  with Dr. Lexy Hinkle. RECOMMENDATIONS/PLAN:  I discussed with the patient the following points: The benefits and limitations of ultrasound in prenatal diagnosis. Some defects might not always be seen by ultrasound. Estimated incidence of these defects in the general population is 2- 4%. No structural  anomalies are noted. Only genetic amniocentesis can rule out fetal chromosome anomalies. Normal ultrasound does not. The size of her baby is appropriate for gestational age. Her sugars should be well-controlled. Her fasting should be kept under 92 mg/dl, 2-hour postprandial should be kept under 120. She is to stay on the  ADA diet, and  insulin is needed. She was given Lantus 6 units at bedtime sq. She should go up daily by 2 units until next days fasting is less than 92 mg/dl. Poor sugar controI is associated with an increased risk of developing  complications such as delayed maturation of the lungs, electrolyte imbalance, seizures, and jaundice. There is also an increased risk of delivering prematurely and an increased risk of having a large for date baby. Exposure to different medications such as  Belsomra (Suvorexant), in the first trimester of pregnancy increases the likelihood of having a baby with birth defects. No structural anomalies are seen today by ultrasound . Not all birth defects, however, can be seen by ultrasound, and some defects might show on ultrasounds done later in pregnancy. Suvorexant is classified as Category C medication in pregnancy.  Animal reproduction studies have shown an adverse effect on the fetus and there are no adequate and well-controlled studies in humans, but potential benefits may warrant use of the drug in pregnant women despite potential risks. She should continue to restrict her activity. She is not to lift more than 15Lb weight, and should not be on her feet more than 2hrs per day. She should abstain from sexual relations. She should continue treatment with vaginal suppositories 200 mg every night. Fetal well-being was confirmed today. The amount of fluid around baby is normal.  The Biophysical profile score of 8/8 is reassuring, and the umbilical artery Doppler studies are normal.  She should monitor fetal well-being at home by counting movements after dinner. Her baby should  move 10 times in 2 hours; otherwise, she should call your office immediately. She is also to call, if she develops any headaches, blurred vision, abdominal pain, bleeding, or spotting, which are signs of preeclampsia. She is to continue to follow with you in your office for ongoing obstetric care. I recommend follow-up ultrasound evaluation in our [facility] office in 2 weeks to check on sugar control and on fetal wellbeing and growth. Thank you again, doctor, for allowing us to be of service to your patient. If I can be of further assistance, please do not hesitate to call. Sincerely,        Les Monsalve M.D., [facility]    The total time in minutes spent reviewing medical records, reviewing imaging studies, performing ultrasonic imaging, reviewing laboratory testing, and documenting information was  30  minutes, of which, 50% of the time was spent in patient education, counseling, and coordinating care with the patient, her provider, and/or her family. I answered all of her questions to her satisfaction.     Current encounter billing:  [place] OFFICE OUTPATIENT VISIT LOW MDM 20-30 MINUTES [[id number]]  US OB 1 or More Fetus Limited [80240 Custom]  US Fetal Biophysical Profile WO Non Stress Testing [22054 Custom]  US Doppler Fetal Umbilical Artery [[id number] Custom]    **This report has been created using voice recognition software.  It may contain minor errors     which are inherent in voice recognition technology**

## 2022-09-13 NOTE — LETTER
22     RE:  Corinna Vazquez   : 1985   AGE: 31 y.o. REFERRING PHYSICIANs:                                               Venita Shi      Dear Drs. Mrs. Corinna Vazquez a 31 y.o.    is seen today on follow up in our office. REASON FOR APPOINTMENT:  · Follow-up on a morbidly obese patient with gestational diabetes, history of first trimester exposure to suvorexant (Belsomra)    MEDICATIONS:     [place] Gummies 2/day.  Unisom once per day for treatment of morning sickness.  Vitamin B6 for treatment of morning sickness.  Progesterone vaginal suppositories 200 mg at bedtime daily.  Iron supplement. INTERVAL HISTORY:  Mrs Corinna Vazquez had an uneventful course of pregnancy since her last visit to our office. When seen today in our office she had no complaints. PHYSICAL EXAMINATION:  General Appearance:  Healthy looking, alert, no acute distress. Eyes:     No pallor, no icterus, no photophobia. Ears:     No ear drainage. Nose:     No nasal drainage, no paranasal sinus tenderness. Throat:   Mucosa moist, no oral thrush, no exudate. Neck:     No nuchal rigidity. Back:     No CVA tenderness. Abdomen:    Soft nontender. Extremities:    No pretibial pitting edema, no calf muscle tenderness. Skin:     No rashes, no lesions. BP: 113/77 Weight: 269 lb 8 oz (122.2 kg)   Heart Rate: (!) 104     Body mass index is 43.5 kg/m². Urine dipstick:  Glucose : Negative   Albumin:  Trace       An ultrasound evaluation was done in our office today. I reviewed the ultrasound pictures stored in the hard drive of the ultrasound machine with the patient. Please refer to the enclosed copy of the ultrasound report for further information. Chart and Lab Work Review:  Review of her log book shows:  Elevated fasting sugars (above 92 mg/dl)  Adequate post prandial sugar control (her 2hr pp are under 120 mg/dl)    IMPRESSION:  1.  A 31w0d  intrauterine gestation. 2. Gestational diabetes. 3. Short cervix on treatment with vaginal suppositories of progesterone. 4.  Morbid maternal obesity. 5. History of anxiety and depression, not requiring treatment at present (stopped Belsomra)  6. First trimester exposure to Belsomra [facility]), stopped. 7. S/p myomectomy in  in [place], followed by normal vaginal delivery  with Dr. Baldo Sharma. RECOMMENDATIONS/PLAN:  I discussed with the patient the following points:    1. The benefits and limitations of ultrasound in prenatal diagnosis. Some defects might not always be seen by ultrasound. Estimated incidence of these defects in the general population is 2- 4%. 2. No structural  anomalies are noted. Only genetic amniocentesis can rule out fetal chromosome anomalies. Normal ultrasound does not. 3. The size of her baby is appropriate for gestational age. 4. Her sugars should be well-controlled. Her fasting should be kept under 92 mg/dl, 2-hour postprandial should be kept under 120. She is to stay on the  ADA diet, and  insulin is needed. 5. She was given Lantus 6 units at bedtime sq. She should go up daily by 2 units until next days fasting is less than 92 mg/dl. 6. Poor sugar controI is associated with an increased risk of developing  complications such as delayed maturation of the lungs, electrolyte imbalance, seizures, and jaundice. There is also an increased risk of delivering prematurely and an increased risk of having a large for date baby. 7. Exposure to different medications such as  Belsomra (Suvorexant), in the first trimester of pregnancy increases the likelihood of having a baby with birth defects. No structural anomalies are seen today by ultrasound . Not all birth defects, however, can be seen by ultrasound, and some defects might show on ultrasounds done later in pregnancy. 8. Suvorexant is classified as Category C medication in pregnancy.  Animal reproduction studies have shown an adverse effect on the fetus and there are no adequate and well-controlled studies in humans, but potential benefits may warrant use of the drug in pregnant women despite potential risks. 9. She should continue to restrict her activity. She is not to lift more than 15Lb weight, and should not be on her feet more than 2hrs per day. She should abstain from sexual relations. 10. She should continue treatment with vaginal suppositories 200 mg every night. 11. Fetal well-being was confirmed today. The amount of fluid around baby is normal.  The Biophysical profile score of 8/8 is reassuring, and the umbilical artery Doppler studies are normal.  12. She should monitor fetal well-being at home by counting movements after dinner. Her baby should  move 10 times in 2 hours; otherwise, she should call your office immediately. She is also to call, if she develops any headaches, blurred vision, abdominal pain, bleeding, or spotting, which are signs of preeclampsia. 13. She is to continue to follow with you in your office for ongoing obstetric care. 14. I recommend follow-up ultrasound evaluation in our [facility] office in 2 weeks to check on sugar control and on fetal wellbeing and growth. Thank you again, doctor, for allowing us to be of service to your patient. If I can be of further assistance, please do not hesitate to call. Sincerely,        Yadira Thomas M.D., [address] [facility]    The total time in minutes spent reviewing medical records, reviewing imaging studies, performing ultrasonic imaging, reviewing laboratory testing, and documenting information was  30  minutes, of which, 50% of the time was spent in patient education, counseling, and coordinating care with the patient, her provider, and/or her family. I answered all of her questions to her satisfaction.     Current encounter billing:  [place] OFFICE OUTPATIENT VISIT LOW MDM 20-30 MINUTES [[id number]]  US OB 1 or More Fetus Limited [[id number] Custom]  US Fetal Biophysical Profile WO Non Stress Testing [id number] Custom]  US Doppler Fetal Umbilical Artery [[id number] Custom]    **This report has been created using voice recognition software.  It may contain minor errors     which are inherent in voice recognition technology**

## 2022-09-13 NOTE — PATIENT INSTRUCTIONS
Please arrive for your scheduled appointment at least 15 minutes early with your actual insurance card+ a photo ID. Also if you need any refills ordered or have questions, it may take up 48 hours to reply. Please allow ample time for your refills. Call me when you use last refill. Thank you for your cooperation. Call your primary obstetrician with bleeding, leaking of fluid, abdominal tenderness, headache, blurry vision, epigastric pain and increased urinary frequency. If you are experiencing an emergency and need immediate help, call 911 or go to go emergency room or labor and delivery. Do kick counts after dinner. Call your primary obstetrician if less than 10 kicks in 2 hours after dinner. Call your primary obstetrician with bleeding, leaking of fluid, abdominal tenderness, headache, blurry vision, epigastric pain and increased urinary frequency. if you are sick, not feeling well or have an infectious process going on please reschedule your appointment by calling [phone]. Also if any family members are not feeling well, please do not bring them to your appointment. We appreciate your cooperation. We are doing this in order to protect our pregnant mothers+ their babies. if you are sick, not feeling well or have an infectious process going on please reschedule your appointment by calling [phone]. Also if any family members are not feeling well, please do not bring them to your appointment. We appreciate your cooperation. We are doing this in order to protect our pregnant mothers+ their babies.

## 2022-09-13 NOTE — PROGRESS NOTES
Pt here for pregnancy ultrasound   Pt denies any bleeding but some cramping  Blood sugars scanned into media  Pt states good fetal movement

## 2022-09-27 ENCOUNTER — ANCILLARY PROCEDURE (OUTPATIENT)
Dept: OBGYN CLINIC | Age: 37
End: 2022-09-27
Payer: COMMERCIAL

## 2022-09-27 ENCOUNTER — ROUTINE PRENATAL (OUTPATIENT)
Dept: OBGYN CLINIC | Age: 37
End: 2022-09-27
Payer: COMMERCIAL

## 2022-09-27 VITALS
SYSTOLIC BLOOD PRESSURE: 125 MMHG | HEART RATE: 104 BPM | BODY MASS INDEX: 44.26 KG/M2 | DIASTOLIC BLOOD PRESSURE: 82 MMHG | WEIGHT: 274.2 LBS

## 2022-09-27 DIAGNOSIS — E66.01 MATERNAL MORBID OBESITY IN SECOND TRIMESTER, ANTEPARTUM (HCC): ICD-10-CM

## 2022-09-27 DIAGNOSIS — O26.872 SHORT CERVIX, SECOND TRIMESTER: ICD-10-CM

## 2022-09-27 DIAGNOSIS — O99.212 MATERNAL MORBID OBESITY IN SECOND TRIMESTER, ANTEPARTUM (HCC): ICD-10-CM

## 2022-09-27 DIAGNOSIS — O24.414 INSULIN CONTROLLED GESTATIONAL DIABETES MELLITUS (GDM) IN THIRD TRIMESTER: Primary | ICD-10-CM

## 2022-09-27 DIAGNOSIS — O35.5XX0 SUSPECTED DAMAGE TO FETUS FROM DRUGS, AFFECTING MANAGEMENT OF MOTHER, SINGLE OR UNSPECIFIED FETUS: ICD-10-CM

## 2022-09-27 DIAGNOSIS — Z3A.33 33 WEEKS GESTATION OF PREGNANCY: ICD-10-CM

## 2022-09-27 LAB
GLUCOSE URINE, POC: NEGATIVE
PROTEIN UA: POSITIVE

## 2022-09-27 PROCEDURE — G8417 CALC BMI ABV UP PARAM F/U: HCPCS | Performed by: OBSTETRICS & GYNECOLOGY

## 2022-09-27 PROCEDURE — 99213 OFFICE O/P EST LOW 20 MIN: CPT | Performed by: OBSTETRICS & GYNECOLOGY

## 2022-09-27 PROCEDURE — 81002 URINALYSIS NONAUTO W/O SCOPE: CPT | Performed by: OBSTETRICS & GYNECOLOGY

## 2022-09-27 PROCEDURE — 76820 UMBILICAL ARTERY ECHO: CPT | Performed by: OBSTETRICS & GYNECOLOGY

## 2022-09-27 PROCEDURE — 76816 OB US FOLLOW-UP PER FETUS: CPT | Performed by: OBSTETRICS & GYNECOLOGY

## 2022-09-27 PROCEDURE — 76819 FETAL BIOPHYS PROFIL W/O NST: CPT | Performed by: OBSTETRICS & GYNECOLOGY

## 2022-09-27 PROCEDURE — G8427 DOCREV CUR MEDS BY ELIG CLIN: HCPCS | Performed by: OBSTETRICS & GYNECOLOGY

## 2022-09-27 PROCEDURE — 1036F TOBACCO NON-USER: CPT | Performed by: OBSTETRICS & GYNECOLOGY

## 2022-09-27 NOTE — LETTER
machine with the patient. Please refer to the enclosed copy of the ultrasound report for further information. Chart and Lab Work Review:  Review of her log book shows:  Elevated fasting sugars (above 92 mg/dl)  Adequate post prandial sugar control (her 2hr pp are under 120 mg/dl)    IMPRESSION:  1. A  33w0d  intrauterine gestation. 2. Gestational diabetes. 3. Short cervix on treatment with vaginal suppositories of progesterone. 4.  Morbid maternal obesity. 5. History of anxiety and depression, not requiring treatment at present (stopped Belsomra)  6. First trimester exposure to Belsomra [facility]), stopped. 7. S/p myomectomy in  in [place], followed by normal vaginal delivery  with Dr. Ramiro Meléndez. RECOMMENDATIONS/PLAN:  I discussed with the patient the following points:    1. The benefits and limitations of ultrasound in prenatal diagnosis. Some defects might not always be seen by ultrasound. Estimated incidence of these defects in the general population is 2- 4%. 2. No structural  anomalies are noted. Only genetic amniocentesis can rule out fetal chromosome anomalies. Normal ultrasound does not. 3. The size of her baby is appropriate for gestational age. 4. Her sugars should be well-controlled. Her fasting should be kept under 92 mg/dl, 2-hour postprandial should be kept under 120. She is to stay on the  ADA diet, and  insulin. 5. She should continue to go up daily by 2 units on the insulin glargine until next days fasting is less than 92 mg/dl. 6. Poor sugar controI is associated with an increased risk of developing  complications such as delayed maturation of the lungs, electrolyte imbalance, seizures, and jaundice. There is also an increased risk of delivering prematurely and an increased risk of having a large for date baby.    7. Exposure to different medications such as  Belsomra (Suvorexant), in the first trimester of pregnancy increases the likelihood of having a baby with birth defects. No structural anomalies are seen today by ultrasound . Not all birth defects, however, can be seen by ultrasound, and some defects might show on ultrasounds done later in pregnancy. 8. Suvorexant is classified as Category C medication in pregnancy. Animal reproduction studies have shown an adverse effect on the fetus and there are no adequate and well-controlled studies in humans, but potential benefits may warrant use of the drug in pregnant women despite potential risks. 9. She should continue to restrict her activity. She is not to lift more than 15Lb weight, and should not be on her feet more than 2hrs per day. She should abstain from sexual relations. 10. She should continue treatment with vaginal suppositories 200 mg every night. 11. Fetal well-being was confirmed today. The amount of fluid around baby is normal.  The Biophysical profile score of 8/8 is reassuring, and the umbilical artery Doppler studies are normal.  12. She should monitor fetal well-being at home by counting movements after dinner. Her baby should  move 10 times in 2 hours; otherwise, she should call your office immediately. She is also to call, if she develops any headaches, blurred vision, abdominal pain, bleeding, or spotting, which are signs of preeclampsia. 13. She is to continue to follow with you in your office for ongoing obstetric care, and continue to be monitored in your office with nonstress test every 3 to 4 days for remainder of pregnancy  14. I recommend follow-up ultrasound evaluation in our [facility] office in 2 weeks to check on sugar control and on fetal wellbeing and growth. Thank you again, doctor, for allowing us to be of service to your patient. If I can be of further assistance, please do not hesitate to call.       Sincerely,        Yadira Thomas M.D., [address] [facility]    The total time in minutes spent reviewing medical records, reviewing imaging studies, performing ultrasonic imaging, reviewing laboratory testing, and documenting information was  25  minutes, of which, 50% of the time was spent in patient education, counseling, and coordinating care with the patient, her provider, and/or her family. I answered all of her questions to her satisfaction. Current encounter billing:  [place] OFFICE OUTPATIENT VISIT LOW MDM 20-30 MINUTES [[id number]]  US OB Follow Up Transabdominal Approach [[id number] Custom]  US Fetal Biophysical Profile WO Non Stress Testing [[id number] Custom]  US Doppler Fetal Umbilical Artery [[id number] Custom]    **This report has been created using voice recognition software.  It may contain minor errors     which are inherent in voice recognition technology**

## 2022-09-27 NOTE — PATIENT INSTRUCTIONS
Please arrive for your scheduled appointment at least 15 minutes early with your actual insurance card+ a photo ID. Also if you need any refills ordered or have questions, it may take up 48 hours to reply. Please allow ample time for your refills. Call me when you use last refill. Thank you for your cooperation. You might be having an NST at your next appt. Please eat a large snack or breakfast before coming to office. Thank you Call your primary obstetrician with bleeding, leaking of fluid, abdominal tenderness, headache, blurry vision, epigastric pain and increased urinary frequency. Assisted with pelvic exam, pap smear obtained, labeled  and delivered to lab.

## 2022-09-27 NOTE — PROGRESS NOTES
22     RE:  Julia Kwon   : 1985   AGE: 31 y.o. REFERRING PHYSICIANs:                                               Mark Anthony Alves      Dear DrsPina Mrs. Julia Kwon a 31 y.o.  Robert Wilson  is seen today on follow up in our office. REASON FOR APPOINTMENT:  Follow-up on a morbidly obese patient with gestational diabetes, history of first trimester exposure to suvorexant (Belsomra)    MEDICATIONS:    [place] Gummies 2/day. Unisom once per day for treatment of morning sickness. Vitamin B6 for treatment of morning sickness. Progesterone vaginal suppositories 200 mg at bedtime daily. Iron supplement. Insulin Glargine 14 units subcu at bedtime. INTERVAL HISTORY:  Mrs Julia Kwon had an uneventful course of pregnancy since her last visit to our office. When seen today in our office she had no complaints. She said that she started using insulin Lantus 5 days ago (had problems acquiring the medication), and she has been going up daily by 2 units as instructed. She also said that she was scheduled to start having the nonstress test in your office this Friday. PHYSICAL EXAMINATION:  General Appearance:  Healthy looking, alert, no acute distress. Eyes:     No pallor, no icterus, no photophobia. Ears:     No ear drainage. Nose:     No nasal drainage, no paranasal sinus tenderness. Throat:   Mucosa moist, no oral thrush, no exudate. Neck:     No nuchal rigidity. Back:     No CVA tenderness. Abdomen:    Soft nontender. Extremities:    No pretibial pitting edema, no calf muscle tenderness. Skin:     No rashes, no lesions. BP: 125/82 Weight: 274 lb 3.2 oz (124.4 kg)   Heart Rate: (!) 104     Body mass index is 44.26 kg/m². Urine dipstick:  Glucose : Negative   Albumin:  Trace       An ultrasound evaluation was done in our office today.    I reviewed the ultrasound pictures stored in the hard drive of the ultrasound machine with the patient. Please refer to the enclosed copy of the ultrasound report for further information. Chart and Lab Work Review:  Review of her log book shows:  Elevated fasting sugars (above 92 mg/dl)  Adequate post prandial sugar control (her 2hr pp are under 120 mg/dl)    IMPRESSION:  A  33w0d  intrauterine gestation. Gestational diabetes. Short cervix on treatment with vaginal suppositories of progesterone. Morbid maternal obesity. History of anxiety and depression, not requiring treatment at present (stopped Belsomra)  First trimester exposure to Belsomra [facility]), stopped. S/p myomectomy in  in [place], followed by normal vaginal delivery  with Dr. Ramiro Meléndez. RECOMMENDATIONS/PLAN:  I discussed with the patient the following points: The benefits and limitations of ultrasound in prenatal diagnosis. Some defects might not always be seen by ultrasound. Estimated incidence of these defects in the general population is 2- 4%. No structural  anomalies are noted. Only genetic amniocentesis can rule out fetal chromosome anomalies. Normal ultrasound does not. The size of her baby is appropriate for gestational age. Her sugars should be well-controlled. Her fasting should be kept under 92 mg/dl, 2-hour postprandial should be kept under 120. She is to stay on the  ADA diet, and  insulin. She should continue to go up daily by 2 units on the insulin glargine until next days fasting is less than 92 mg/dl. Poor sugar controI is associated with an increased risk of developing  complications such as delayed maturation of the lungs, electrolyte imbalance, seizures, and jaundice. There is also an increased risk of delivering prematurely and an increased risk of having a large for date baby. Exposure to different medications such as  Belsomra (Suvorexant), in the first trimester of pregnancy increases the likelihood of having a baby with birth defects.  No structural anomalies are seen today by ultrasound . Not all birth defects, however, can be seen by ultrasound, and some defects might show on ultrasounds done later in pregnancy. Suvorexant is classified as Category C medication in pregnancy. Animal reproduction studies have shown an adverse effect on the fetus and there are no adequate and well-controlled studies in humans, but potential benefits may warrant use of the drug in pregnant women despite potential risks. She should continue to restrict her activity. She is not to lift more than 15Lb weight, and should not be on her feet more than 2hrs per day. She should abstain from sexual relations. She should continue treatment with vaginal suppositories 200 mg every night. Fetal well-being was confirmed today. The amount of fluid around baby is normal.  The Biophysical profile score of 8/8 is reassuring, and the umbilical artery Doppler studies are normal.  She should monitor fetal well-being at home by counting movements after dinner. Her baby should  move 10 times in 2 hours; otherwise, she should call your office immediately. She is also to call, if she develops any headaches, blurred vision, abdominal pain, bleeding, or spotting, which are signs of preeclampsia. She is to continue to follow with you in your office for ongoing obstetric care, and continue to be monitored in your office with nonstress test every 3 to 4 days for remainder of pregnancy  I recommend follow-up ultrasound evaluation in our [facility] office in 2 weeks to check on sugar control and on fetal wellbeing and growth. Thank you again, doctor, for allowing us to be of service to your patient. If I can be of further assistance, please do not hesitate to call.       Sincerely,        Agustín Jackson M.D., [address] [facility]    The total time in minutes spent reviewing medical records, reviewing imaging studies, performing ultrasonic imaging, reviewing laboratory testing, and documenting information was  25  minutes, of which,

## 2022-10-18 ENCOUNTER — ANCILLARY PROCEDURE (OUTPATIENT)
Dept: OBGYN CLINIC | Age: 37
End: 2022-10-18
Payer: COMMERCIAL

## 2022-10-18 ENCOUNTER — ROUTINE PRENATAL (OUTPATIENT)
Dept: OBGYN CLINIC | Age: 37
End: 2022-10-18
Payer: COMMERCIAL

## 2022-10-18 VITALS
SYSTOLIC BLOOD PRESSURE: 137 MMHG | WEIGHT: 272 LBS | BODY MASS INDEX: 43.9 KG/M2 | HEART RATE: 102 BPM | DIASTOLIC BLOOD PRESSURE: 87 MMHG

## 2022-10-18 DIAGNOSIS — O24.414 INSULIN CONTROLLED GESTATIONAL DIABETES MELLITUS (GDM) IN THIRD TRIMESTER: Primary | ICD-10-CM

## 2022-10-18 DIAGNOSIS — O35.5XX0 SUSPECTED DAMAGE TO FETUS FROM DRUGS, AFFECTING MANAGEMENT OF MOTHER, SINGLE OR UNSPECIFIED FETUS: ICD-10-CM

## 2022-10-18 DIAGNOSIS — E66.01 MATERNAL MORBID OBESITY IN SECOND TRIMESTER, ANTEPARTUM (HCC): ICD-10-CM

## 2022-10-18 DIAGNOSIS — Z3A.36 36 WEEKS GESTATION OF PREGNANCY: ICD-10-CM

## 2022-10-18 DIAGNOSIS — O13.3 PIH (PREGNANCY INDUCED HYPERTENSION), THIRD TRIMESTER: ICD-10-CM

## 2022-10-18 DIAGNOSIS — O99.212 MATERNAL MORBID OBESITY IN SECOND TRIMESTER, ANTEPARTUM (HCC): ICD-10-CM

## 2022-10-18 LAB
GLUCOSE URINE, POC: NEGATIVE
PROTEIN UA: POSITIVE

## 2022-10-18 PROCEDURE — 76816 OB US FOLLOW-UP PER FETUS: CPT | Performed by: OBSTETRICS & GYNECOLOGY

## 2022-10-18 PROCEDURE — 99213 OFFICE O/P EST LOW 20 MIN: CPT | Performed by: OBSTETRICS & GYNECOLOGY

## 2022-10-18 PROCEDURE — 81002 URINALYSIS NONAUTO W/O SCOPE: CPT | Performed by: OBSTETRICS & GYNECOLOGY

## 2022-10-18 PROCEDURE — G8417 CALC BMI ABV UP PARAM F/U: HCPCS | Performed by: OBSTETRICS & GYNECOLOGY

## 2022-10-18 PROCEDURE — 76818 FETAL BIOPHYS PROFILE W/NST: CPT | Performed by: OBSTETRICS & GYNECOLOGY

## 2022-10-18 PROCEDURE — 1036F TOBACCO NON-USER: CPT | Performed by: OBSTETRICS & GYNECOLOGY

## 2022-10-18 PROCEDURE — 76820 UMBILICAL ARTERY ECHO: CPT | Performed by: OBSTETRICS & GYNECOLOGY

## 2022-10-18 PROCEDURE — G8427 DOCREV CUR MEDS BY ELIG CLIN: HCPCS | Performed by: OBSTETRICS & GYNECOLOGY

## 2022-10-18 PROCEDURE — G8484 FLU IMMUNIZE NO ADMIN: HCPCS | Performed by: OBSTETRICS & GYNECOLOGY

## 2022-10-18 NOTE — PROGRESS NOTES
NST on 0923  Pt given remote marker to denote movement. Baseline 140's with accelerations/no decelerations/no contractions. NST complete at 0947  Read reactive by Dr. Taylor Montilla

## 2022-10-18 NOTE — LETTER
10/18/22     RE:  Zach Diaz   : 1985   AGE: 31 y.o. REFERRING PHYSICIANs:                                               Ayanna Nielsen      Dear Yohannes. Mrs. Zach Diaz a 31 y.o.    is seen today on follow up in our office. REASON FOR APPOINTMENT:  · Follow-up on a morbidly obese patient with gestational diabetes, history of first trimester exposure to suvorexant (Belsomra)    MEDICATIONS:     [place] Gummies 2/day.  Unisom once per day for treatment of morning sickness.  Vitamin B6 for treatment of morning sickness.  Progesterone vaginal suppositories 200 mg at bedtime daily.  Iron supplement.  Insulin Glargine 26 units subcu at bedtime. INTERVAL HISTORY:  Mrs Zach Diaz had an uneventful course of pregnancy since her last visit to our office. When seen today in our office she had no complaints. She reported good fetal movements. She denied having any headache blurred vision or abdominal pain. PHYSICAL EXAMINATION:  General Appearance:  Healthy looking, alert, no acute distress. Eyes:     No pallor, no icterus, no photophobia. Ears:     No ear drainage. Nose:     No nasal drainage, no paranasal sinus tenderness. Throat:   Mucosa moist, no oral thrush, no exudate. Neck:     No nuchal rigidity. Back:     No CVA tenderness. Abdomen:    Soft nontender. Extremities:    No pretibial pitting edema, no calf muscle tenderness. Skin:     No rashes, no lesions. BP: (!) 139/92, Repeated  BP: 137/87 Weight: 272 lb (123.4 kg)   Heart Rate: (!) 102     Body mass index is 43.9 kg/m². Urine dipstick:  Glucose : Negative   Albumin:  Trace       An ultrasound evaluation was done in our office today. I reviewed the ultrasound pictures stored in the hard drive of the ultrasound machine with the patient. Please refer to the enclosed copy of the ultrasound report for further information.     Chart and Lab Work Review:  Review of her log book shows: Adequate sugar control with fasting sugars under 92 and 2hr pp under 120 mg/dl. IMPRESSION:  1. A  36w0d  intrauterine gestation. 2. Pregnancy-induced hypertension. 3. Gestational diabetes. 4. Excessive fetal growth. 5. Short cervix on treatment with vaginal suppositories of progesterone. 6.  Morbid maternal obesity. 7. History of anxiety and depression, not requiring treatment at present (stopped Belsomra)  8. First trimester exposure to Belsomra [facility]), stopped. 9. S/p myomectomy in  in [place], followed by normal vaginal delivery  with Dr. Courtney Larios. RECOMMENDATIONS/PLAN:  I discussed with the patient the following points:    1. The benefits and limitations of ultrasound in prenatal diagnosis. Some defects might not always be seen by ultrasound. Estimated incidence of these defects in the general population is 2- 4%. 2. No structural  anomalies are noted. Only genetic amniocentesis can rule out fetal chromosome anomalies. Normal ultrasound does not. 3. The size of her baby is at the 90th percentile for gestational age. 4. Her sugars are well-controlled. Her fasting should be kept under 92 mg/dl, 2-hour postprandial should be kept under 120. She is to stay on the  ADA diet, and  insulin. If needed, she should continue to go up daily by 2 units on the insulin glargine until next days fasting is less than 92 mg/dl. 5. Poor sugar controI is associated with an increased risk of developing  complications such as delayed maturation of the lungs, electrolyte imbalance, seizures, and jaundice. There is also an increased risk of delivering prematurely and an increased risk of having a large for date baby. 6. Exposure to different medications such as  Belsomra (Suvorexant), in the first trimester of pregnancy increases the likelihood of having a baby with birth defects. No structural anomalies are seen today by ultrasound . Not all birth defects, however, can be seen by ultrasound, and some defects might show on ultrasounds done later in pregnancy. 7. Suvorexant is classified as Category C medication in pregnancy. Animal reproduction studies have shown an adverse effect on the fetus and there are no adequate and well-controlled studies in humans, but potential benefits may warrant use of the drug in pregnant women despite potential risks. 8. She should continue to restrict her activity. She is not to lift more than 15Lb weight, and should not be on her feet more than 2hrs per day. She should abstain from sexual relations. 9. Fetal well-being was confirmed today. The amount of fluid around baby is normal.  The Biophysical profile score of 10/10 is reassuring, and the umbilical artery Doppler studies are normal.  10. She should monitor fetal well-being at home by counting movements after dinner. Her baby should  move 10 times in 2 hours; otherwise, she should call your office immediately. She is also to call, if she develops any headaches, blurred vision, abdominal pain, bleeding, or spotting, which are signs of preeclampsia. 11. She is to continue to follow with you in your office for ongoing obstetric care, and continue to be monitored in your office with nonstress test every Friday for remainder of pregnancy  12. Should be monitored in our [facility] office every Tuesday with BPP Dopplers for remainder of pregnancy. Thank you again, doctor, for allowing us to be of service to your patient. If I can be of further assistance, please do not hesitate to call.       Sincerely,        Elizabeth Caballero M.D., [address] [facility]    The total time in minutes spent reviewing medical records, reviewing imaging studies, performing ultrasonic imaging, reviewing laboratory testing, and documenting information was  25  minutes, of which, 50% of the time was spent in patient education, counseling, and coordinating care with the patient, her provider, and/or her family. I answered all of her questions to her satisfaction. Current encounter billing:  AL OFFICE OUTPATIENT VISIT LOW MDM 20-30 MINUTES [[id number]]  US OB Follow Up Transabdominal Approach [WRM617 Custom]  US Fetal Biophysical Profile WO Non Stress Testing [[id number] Custom]  US Doppler Fetal Umbilical Artery [[id number] Custom]    **This report has been created using voice recognition software. It may contain minor errors     which are inherent in voice recognition technology**                NON STRESS TEST INTERPRETATION    10/18/22    RE:  Luz Maria Matthews   : 1985   AGE: 31 y.o. GESTATIONAL AGE:  36w0d    DIAGNOSIS:     Pregnancy-induced hypertension. Gestational diabetes. INDICATION:  Pregnancy-induced hypertension.     TIME ON:  9:23 AM      TIME OFF:  9:47 AM      RESULT:   REACTIVE      FHR Baseline Rate:   140 bpm    PERIODIC CHANGES:    · Accelerations present, variability moderate, no decelerations noted    COMMENTS:      She is to continue having NST's every 3-4 days, and BPP with umbilical artery doppler studies once per week    Javier Davis MD

## 2022-10-18 NOTE — PROGRESS NOTES
10/18/22     RE:  Megan Marshall   : 1985   AGE: 31 y.o. REFERRING PHYSICIANs:                                               Artemio King      Dear Drs. Mrs. Megan Marshall a 31 y.o.  Cortez Rhodes  is seen today on follow up in our office. REASON FOR APPOINTMENT:  Follow-up on a morbidly obese patient with gestational diabetes, history of first trimester exposure to suvorexant (Belsomra)    MEDICATIONS:    [place] Gummies 2/day. Unisom once per day for treatment of morning sickness. Vitamin B6 for treatment of morning sickness. Progesterone vaginal suppositories 200 mg at bedtime daily. Iron supplement. Insulin Glargine 26 units subcu at bedtime. INTERVAL HISTORY:  Mrs Megan Marshall had an uneventful course of pregnancy since her last visit to our office. When seen today in our office she had no complaints. She reported good fetal movements. She denied having any headache blurred vision or abdominal pain. PHYSICAL EXAMINATION:  General Appearance:  Healthy looking, alert, no acute distress. Eyes:     No pallor, no icterus, no photophobia. Ears:     No ear drainage. Nose:     No nasal drainage, no paranasal sinus tenderness. Throat:   Mucosa moist, no oral thrush, no exudate. Neck:     No nuchal rigidity. Back:     No CVA tenderness. Abdomen:    Soft nontender. Extremities:    No pretibial pitting edema, no calf muscle tenderness. Skin:     No rashes, no lesions. BP: (!) 139/92, Repeated  BP: 137/87 Weight: 272 lb (123.4 kg)   Heart Rate: (!) 102     Body mass index is 43.9 kg/m². Urine dipstick:  Glucose : Negative   Albumin:  Trace       An ultrasound evaluation was done in our office today. I reviewed the ultrasound pictures stored in the hard drive of the ultrasound machine with the patient. Please refer to the enclosed copy of the ultrasound report for further information.     Chart and Lab Work Review:  Review of her log book shows: Adequate sugar control with fasting sugars under 92 and 2hr pp under 120 mg/dl. IMPRESSION:  A  36w0d  intrauterine gestation. Pregnancy-induced hypertension. Gestational diabetes. Excessive fetal growth. Short cervix on treatment with vaginal suppositories of progesterone. Morbid maternal obesity. History of anxiety and depression, not requiring treatment at present (stopped Belsomra)  First trimester exposure to Belsomra [facility]), stopped. S/p myomectomy in  in [place], followed by normal vaginal delivery  with Dr. Danilo Rocha. RECOMMENDATIONS/PLAN:  I discussed with the patient the following points: The benefits and limitations of ultrasound in prenatal diagnosis. Some defects might not always be seen by ultrasound. Estimated incidence of these defects in the general population is 2- 4%. No structural  anomalies are noted. Only genetic amniocentesis can rule out fetal chromosome anomalies. Normal ultrasound does not. The size of her baby is at the 90th percentile for gestational age. Her sugars are well-controlled. Her fasting should be kept under 92 mg/dl, 2-hour postprandial should be kept under 120. She is to stay on the  ADA diet, and  insulin. If needed, she should continue to go up daily by 2 units on the insulin glargine until next days fasting is less than 92 mg/dl. Poor sugar controI is associated with an increased risk of developing  complications such as delayed maturation of the lungs, electrolyte imbalance, seizures, and jaundice. There is also an increased risk of delivering prematurely and an increased risk of having a large for date baby. Exposure to different medications such as  Belsomra (Suvorexant), in the first trimester of pregnancy increases the likelihood of having a baby with birth defects. No structural anomalies are seen today by ultrasound .   Not all birth defects, however, can be seen by ultrasound, and some defects might show on ultrasounds done later in pregnancy. Suvorexant is classified as Category C medication in pregnancy. Animal reproduction studies have shown an adverse effect on the fetus and there are no adequate and well-controlled studies in humans, but potential benefits may warrant use of the drug in pregnant women despite potential risks. She should continue to restrict her activity. She is not to lift more than 15Lb weight, and should not be on her feet more than 2hrs per day. She should abstain from sexual relations. Fetal well-being was confirmed today. The amount of fluid around baby is normal.  The Biophysical profile score of 10/10 is reassuring, and the umbilical artery Doppler studies are normal.  She should monitor fetal well-being at home by counting movements after dinner. Her baby should  move 10 times in 2 hours; otherwise, she should call your office immediately. She is also to call, if she develops any headaches, blurred vision, abdominal pain, bleeding, or spotting, which are signs of preeclampsia. She is to continue to follow with you in your office for ongoing obstetric care, and continue to be monitored in your office with nonstress test every Friday for remainder of pregnancy  Should be monitored in our [facility] office every Tuesday with BPP Dopplers for remainder of pregnancy. Thank you again, doctor, for allowing us to be of service to your patient. If I can be of further assistance, please do not hesitate to call. Sincerely,        Desi Nelson M.D., [address] [facility]    The total time in minutes spent reviewing medical records, reviewing imaging studies, performing ultrasonic imaging, reviewing laboratory testing, and documenting information was  25  minutes, of which, 50% of the time was spent in patient education, counseling, and coordinating care with the patient, her provider, and/or her family. I answered all of her questions to her satisfaction.     Current encounter billing:  [place] OFFICE OUTPATIENT VISIT LOW MDM 20-30 MINUTES [[id number]]  US OB Follow Up Transabdominal Approach [DXC255 Custom]  US Fetal Biophysical Profile WO Non Stress Testing [[id number] Custom]  US Doppler Fetal Umbilical Artery [[id number] Custom]    **This report has been created using voice recognition software. It may contain minor errors     which are inherent in voice recognition technology**                NON STRESS TEST INTERPRETATION    10/18/22    RE:  Raquel Hernandez   : 1985   AGE: 31 y.o. GESTATIONAL AGE:  36w0d    DIAGNOSIS:     Pregnancy-induced hypertension. Gestational diabetes. INDICATION:  Pregnancy-induced hypertension.     TIME ON:  9:23 AM      TIME OFF:  9:47 AM      RESULT:   REACTIVE      FHR Baseline Rate:   140 bpm    PERIODIC CHANGES:    Accelerations present, variability moderate, no decelerations noted    COMMENTS:      She is to continue having NST's every 3-4 days, and BPP with umbilical artery doppler studies once per week    Elham Biggs MD

## 2022-10-20 ENCOUNTER — FOLLOWUP TELEPHONE ENCOUNTER (OUTPATIENT)
Dept: DIABETES SERVICES | Age: 37
End: 2022-10-20

## 2022-10-20 NOTE — LETTER
[facility])  - Diabetes Education    10/20/2022     Re:     Ac Carreon  :  1985    Dear Dr. Perla Ervin,             Thank you for referring your patient, Ac Carreon, for diabetes education. Your patient has completed their personalized comprehensive diabetes education plan on the following topics: Disease Process, Healthy Eating, Exercise, Monitoring glucose, Acute/chronic complications, Lifestyle and healthy coping, Diabetes distress and support. Ac Carreon education goal  was: Healthy Eating and monitoring  Her outcome was lost to follow-up. Thank you for referring this patient to our program.  Please do not hesitate to call if you have any questions at ( (SEGEOVANNA or CHASE) or [phone] ([address] [place] [address]).         Sincerely,    []  Rigoberto BECK  []  Magalie Rojo RDN LD  []  Henry Alonzo RDN LD  []  Chani Villaseñor MS, RDN, LD  [x]  HAYDEN Sebastian  []  Hector Orta RDN [address] [place] [address] Rd Diabetes Education Department  [employer] Recognized DSMES Program

## 2022-10-20 NOTE — PROGRESS NOTES
Diabetes Self-Management Education Record    Participant Name: Mauricio Amador  Referring Provider: Bryant Mathews MD  Assessment/Evaluation Ratings:  1=Needs Instruction   4=Demonstrates Understanding/Competency  2=Needs Review   NC=Not Covered    3=Comprehends Key Points  N/A=Not Applicable  Topics/Learning Objectives Pre-session Assess Date:  Instructor initials/date   Instr. Date  8/31/22 PC  Instructor initials/date Follow-up Post- session Eval Comments   Diabetes disease process & Treatment process:   -Define type of diabetes in simple terms.  - Describe the ABCs of  diabetes management  -Identify own type of diabetes  -Identify lifestyle changes/treatment options  -other:  1 [] All     [x]  []  [x]  []  []  3 8/31/22 PC  Pt with GDM  Family hx with father  Due date 11/9/22    Developing strategies for Healthy coping/psychosocial issues:    -Describe feelings about living with diabetes  -Identify coping strategies and sources of stress  -Identify support needed & support network available  -Complete PAID-5 Diabetes questionnaire NA [] All     []  []  []    []              Prevention, detection & treatment of Chronic complications:    -Identify the prevention, detection and treatment for complications including immunizations, preventive eye, foot, dental and renal exams as indicated per the participant's duration of diabetes and health status.  -Define the natural course of diabetes and the relationship of blood glucose levels to long term complications of diabetes.   NA [] All     []            []      Prevention, detection & treatment of acute complications:    -State the causes,signs & symptoms of hyper & hypoglycemia, and prevention & treatment strategies.   -Describe sick day guidelines  DKA /indications for ketone testing &  when to call physician  NA [] All     []      []     8/31/22 PC  Denies             -Identify severe weather/situation crisis  & diabetes supplies management  []      Using medications safely:   -State effects of diabetes medicines on blood glucose levels;  -List diabetes medication taken, action & side effects NA [] All     []  []   8/31/22 PC  No DM medications at this time  To call if Dr starts medication   Insulin/Injectables/glucagon  -Name appropriate injection sites; proper storage; supplies needed;  NA   []       Demonstrate proper technique  []      Monitoring blood glucose, interpreting and using results:   -Identify the purpose of testing   -Identify recommended & personal blood glucose targets & HgbA1C target levels  -State the Importance of logging blood glucose levels for pattern recognition;   -State benefits of reading/using pt generated health data  -Verbalize safe lancet disposal 2 [x] All     []  []    []  []  []  3 8/31/22 PC  Pt already testing . Goals reviewed and to call Dr if over goal  Log sheets given and to take to Dr visits.   -Demonstrate proper testing technique  []      Incorporating physical activity into lifestyle:   -State effect of exercise on blood glucose levels;   -State benefits of regular exercise;   -Define safety considerations/food choices if needed.  -Describe contraindications/maintenance of activity.  1 [] All     []  []    []  []   8/31/22 PC  On bed rest  To follow Dr orders for activity   Incorporating nutritional management into lifestyle:   -Describe effect of type, amount & timing of food on blood glucose  -Describe methods for preparing and planning   healthy meals  -Correctly read food labels for nutritional values  -Name 3 foods high in Carbohydrate  -Plan a sample 4 carbohydrate-controlled meal using Diabetes Plate Method  -Verbalized ability to measure and count carbohydrate gram servings using food labels and carbohydrate food list.    -Plan a carbohydrate-controlled meal based on individual needs/preferences from a Registered Dietitian.   1 [] All       [x]    [x]    [x]  []      [x]        []  3   8/31/22 PC  RD was not available at the time of appointment. RD did provide guideance for appropriate meal plan for her to follow and this was followed, written out and explained to pt. She was able to verbal basic concepts of prenatal nutrition and importance of diet in pregnancy. She is nauseated \"daily\" and reviewed intervention she could try and to let her Dr. Eden Talley. Prevention of food-borne illness and to avoid saccharin while pregnant reviewed. Reviewed 2400 kcal meal plan with rationale. Pt able to verbalize snacks and meals she could eat. She will be breastfeeding and need to continue meal plan reviewed. Reviewed importance of fiber and fluids while pregnant. Able to read food label to portion CHO. Fats and Protein also reviewed. Gave her a Tri-fold with CHO, Fats, and Protein portions. CHO distribution: 3/2/3/2/4/2  Pt verbalizes understanding of meal plan and knows to call for any questions. Has contact information. PC                 Developing strategies for problem solving to promote health/change behavior. -Identify 7 self-care behaviors; Personal health risk factors; Benefits, challenges & strategies for behavioral change and set an individualized goal selection.  1       []  3  8/31/22 PC  [x]Nutrition  [x]Monitoring  []Exercise  []Medication  []Other     Identified Barriers to learning/adherence to self management plan:    None  []  other    Instruction Method:  Lecture/Discussion and Handouts    Supporting Education Materials/Equipment Provided: Gestational Pathway Booklet   []Slovak materials       [] services     []Other:      Encounter Type Date Attended Start Time End Time Comments No Show Dates   Assessment          Session 1         Session 2        1:1 DSMES          In person Follow-up         Gestational Diabetes 8/31/22 PC 1000 1100      Individual MNT        Meter Instrx        Insulin Instrx           Additional Comments: [] Pt seen individually due to Covid-19 Safety precautions and no group session available.         Date:   Follow-up goal attainment based on patients initial DSMES goal    Dr Notified by [] EMR []Fax        []Post class Hgb A1C  []Medication compliance   []Plate method/meal plan compliance   []Able to state the number of Carbohydrate servings eaten at B,L,D   []Testing blood glucose as prescribed by PCP   []Exercise Routine   []Other:   []Other:     [x]Patient lost to follow-up  10/20/22 PRICE Robins Notified by [x]EMR []Fax

## 2022-10-21 ENCOUNTER — HOSPITAL ENCOUNTER (OUTPATIENT)
Age: 37
Setting detail: OBSERVATION
Discharge: HOME | End: 2022-10-21
Attending: OBSTETRICS & GYNECOLOGY | Admitting: OBSTETRICS & GYNECOLOGY
Payer: COMMERCIAL

## 2022-10-21 VITALS
HEART RATE: 98 BPM | DIASTOLIC BLOOD PRESSURE: 81 MMHG | WEIGHT: 275 LBS | TEMPERATURE: 98.2 F | RESPIRATION RATE: 16 BRPM | SYSTOLIC BLOOD PRESSURE: 132 MMHG | HEIGHT: 65 IN | BODY MASS INDEX: 45.82 KG/M2

## 2022-10-21 PROBLEM — F07.81 POST-CONCUSSION VERTIGO: Status: RESOLVED | Noted: 2019-02-12 | Resolved: 2022-10-21

## 2022-10-21 PROBLEM — R20.0 NUMBNESS AND TINGLING IN LEFT ARM: Status: RESOLVED | Noted: 2018-12-17 | Resolved: 2022-10-21

## 2022-10-21 PROBLEM — O24.414 INSULIN CONTROLLED GESTATIONAL DIABETES MELLITUS (GDM) DURING PREGNANCY: Status: ACTIVE | Noted: 2022-10-21

## 2022-10-21 PROBLEM — Z03.71 SUSPECTED PROBLEM WITH AMNIOTIC CAVITY AND MEMBRANE NOT FOUND: Status: RESOLVED | Noted: 2022-07-27 | Resolved: 2022-10-21

## 2022-10-21 PROBLEM — R42 POST-CONCUSSION VERTIGO: Status: RESOLVED | Noted: 2019-02-12 | Resolved: 2022-10-21

## 2022-10-21 PROBLEM — Z98.890 HISTORY OF MYOMECTOMY: Status: ACTIVE | Noted: 2022-10-21

## 2022-10-21 PROBLEM — M54.50 ACUTE LOW BACK PAIN: Status: RESOLVED | Noted: 2018-12-17 | Resolved: 2022-10-21

## 2022-10-21 PROBLEM — Z3A.36 36 WEEKS GESTATION OF PREGNANCY: Status: ACTIVE | Noted: 2022-10-21

## 2022-10-21 PROBLEM — O26.879 SHORT CERVIX AFFECTING PREGNANCY: Status: ACTIVE | Noted: 2022-10-21

## 2022-10-21 PROBLEM — R20.2 NUMBNESS AND TINGLING IN LEFT ARM: Status: RESOLVED | Noted: 2018-12-17 | Resolved: 2022-10-21

## 2022-10-21 PROBLEM — T14.90XA TRAUMA: Status: RESOLVED | Noted: 2018-12-16 | Resolved: 2022-10-21

## 2022-10-21 LAB — SARS-COV-2, NAAT: NOT DETECTED

## 2022-10-21 PROCEDURE — 87635 SARS-COV-2 COVID-19 AMP PRB: CPT

## 2022-10-21 PROCEDURE — G0378 HOSPITAL OBSERVATION PER HR: HCPCS

## 2022-10-21 PROCEDURE — 99219 PR INITIAL OBSERVATION CARE/DAY 50 MINUTES: CPT | Performed by: ADVANCED PRACTICE MIDWIFE

## 2022-10-21 NOTE — H&P
Department of Obstetrics and Gynecology  Midwife Obstetrics History and Physical  Admission H and P / Observation Initial Evaluation        CHIEF COMPLAINT:  Prolonged monitoring for decreased fetal movement in office    HISTORY OF PRESENT ILLNESS:  Mauricio Amador is a 31 y.o. female , Patient's last menstrual period was 2022 (approximate). ,  at 36w3d. Presents to L&D with  As above. Baby has moved 1-2 times since triage admission. This is not usually baby's active time of day. 2. Insulin dependent DM: Takes 26U at [place] [address]. Fastings 70-80, Acs 90-100s. Last ate breakfast sandwich at 1030. Has not checked sugar yet. 3. Productive cough X 2 weeks ago, now still having dry non productive cough with some wheezing. Son also had cold last week and today is home with fever. Negative home covid test 3 weeks ago. Prenatals  not available.     OB History          2    Para   1    Term   1            AB        Living   1         SAB        IAB        Ectopic        Molar        Multiple        Live Births   1                Estimated Due Date: determined by: LMP=9weeks scan      Pregnancy complicated by:   Patient Active Problem List   Diagnosis Code    History of myomectomy - followed by  with Dr Sena Turcios F64.638    Insulin controlled gestational diabetes mellitus (GDM) during pregnancy O24.414    Short cervix affecting pregnancy - on vaginal progesterone O26.879     PAST OB HISTORY  OB History          2    Para   1    Term   1            AB        Living   1         SAB        IAB        Ectopic        Molar        Multiple        Live Births   1                Past Medical History:        Diagnosis Date    Abnormal Pap smear of cervix     Acute low back pain     Complication of anesthesia     hard to wake up    Concussion wth loss of consciousness of 30 minutes or less     Contusion of multiple sites of left leg     Depression     Insulin controlled gestational diabetes mellitus (GDM) during pregnancy 10/21/2022    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/2019     Specifically no history of asthma. Past Surgical History:        Procedure Laterality Date    APPENDECTOMY      LAPAROSCOPY      fibroids removed    TONSILLECTOMY      WISDOM TOOTH EXTRACTION       Specifically no history of anesthesia reactions or problems. No history of bleeding or trouble healing / recovering from surgery. Social History:    TOBACCO:   reports that she has never smoked. She has never used smokeless tobacco.  ETOH:   reports no history of alcohol use. DRUGS:   reports no history of drug use. Family History:       Problem Relation Age of Onset    Asthma Mother     Hypertension Mother     Heart Attack Father     Hypertension Father      Specifically no family history of bleeding problems or anesthesia reactions. Medications Prior to Admission:  Medications Prior to Admission: ondansetron (ZOFRAN) 4 MG tablet, Take 1 tablet by mouth every 8 hours as needed for Nausea or Vomiting  Blood Glucose Monitoring Suppl (ONETOUCH VERIO REFLECT) w/Device KIT, use as directed  insulin glargine (LANTUS SOLOSTAR) 100 UNIT/ML injection pen, Inject 6 Units into the skin nightly Go up daily on lantus by 2 units until next day fasting sugar is 92 mg/dl or less.   Insulin Pen Needle 31G X 5 MM MISC, 1 each by Does not apply route daily  Blood Glucose Monitoring Suppl (FREESTYLE LITE) ESAU, 1 Device by Does not apply route daily  blood glucose test strips (FREESTYLE LITE) strip, 1 each by In Vitro route 4 times daily  FreeStyle Lancets MISC, 1 each by Does not apply route 4 times daily  ferrous sulfate (IRON 325) 325 (65 Fe) MG tablet, Take 1 tablet by mouth 2 times daily  Suppository Base (SPG SUPPOSI-BASE) PLLT,   Progesterone 200 MG SUPP, Place 200 mg vaginally nightly  doxyLAMINE succinate (GNP SLEEP AID) 25 MG tablet, Take by mouth nightly  vitamin B-6 (PYRIDOXINE) 50 MG tablet, Take 50 mg by mouth daily  Prenatal MV & Min w/FA-DHA (PRENATAL GUMMIES) 0.18-25 MG CHEW, Take 1 tablet by mouth daily  acetaminophen (TYLENOL) 325 MG tablet, Take 650 mg by mouth every 6 hours as needed for Pain     Allergies:  Lidocaine, Bactrim [sulfamethoxazole-trimethoprim], Procaine hcl, and Quaternium-15    Prenatal Labs  Blood type: O positive  Antibody screen: negative    Hemoglobin:   Lab Results   Component Value Date    HGB 11.7 12/17/2018      Hematocrit:   Lab Results   Component Value Date    HCT 37.2 12/17/2018      Platelets:   Rubella: Immune  RPR: NR  Hepatitis B Surface Antigen: neg  HIV:neg  Hep C:    Gonorrhea: neg  Chlamydia: neg  Group B Strep: unkown    Fetal Biometry at last U/S on 10/18  BPD 85.6 mm 34w 4d 18% Hadlock  .6 mm 36w 2d 56% Lisandra  .4 mm 33w 5d 17% Lisandra  .7 mm 40w 0d >99% Hadlock  Femur 69.4 mm 35w 4d 35% Hadlock  HC / AC 0.86  Fetal Weight Calcula? ?on:  EFW 3,291 g 90% Marcia  EFW (lb,oz) 7 lb 4 oz    COVID-19:   Vaccination status:   2 vaccines no boosters  No results found for: COVID19 denies Hx covid infection    TDAP vaccination status: not done  Flu Vaccination status: not done    REVIEW OF SYSTEMS:          CONSTITUTIONAL :      No fever, no chills   HEENT :                         Headache absent,   visual disturbances absent  CARDIOVASCULAR :    No chest pain, no palpitations, no edema   RESPIRATORY :            No pain, no shortness of breath . Lungs clear to all fields but limited inspiration causes non productive cough. ABDOMEN/Uterus: soft NT  GASTROINTESTINAL : No N/V, no D/C,    abdominal pain absent   GENITOURINARY :      Dysuria   absent,   hematuria absent,   urinary frequency absent  Vaginal bleeding absent  Vaginal discharge absent  MUSCULOSKELETAL:  No myalgia,   back pain absent  NEUROLOGICAL :    No migraine, no seizures.    INTEGUMENTARY: Denies lesions or rashes, Edema none    Pertinent positives and negatives addressed in HPI, other systems reviewed and negative      PHYSICAL EXAM:    /81   Pulse 98   Temp 98.2 °F (36.8 °C) (Oral)   Resp 16   Ht 5' 5\" (1.651 m)   Wt 275 lb (124.7 kg)   LMP 2022 (Approximate)   BMI 45.76 kg/m²     General appearance:  awake, alert, cooperative, no apparent distress, and appears stated age  Neurologic:  Awake, alert, oriented to name, place and time. Ambulatory to unit    Lungs:  Respirations  pt feels need to cough with almost every deep inhale  Abdomen:   soft, gravid, non-tender,    Fetal position vertex by Leopold's   EFW LGA  : CVA tenderness absent  EXTREMITIES:   Fetal heart rate:  Baseline Heart Rate 130   Variability moderate   Accelerations Present   Decelerations absent    Membranes:  Intact       Impression:    31 y.o.  36w3d   2, GBS: unknown  3. Fetal Heart Tracing category: 1  4. 3 week cough with sick contact at home      Discussed with Dr Ap Ruiz:   Continue to monitor. Covid test ordered.              Electronically signed by PINKY Patel CNM on 10/21/2022 at 12:07 PM

## 2022-10-21 NOTE — PROGRESS NOTES
36.3 weeks presents from Dr. Dennie Listen office for prolonged monitoring. Pt denies lof, vb or ctxs. States good fetal movement. Pt is IR GDM. EFM applied.

## 2022-10-21 NOTE — DISCHARGE INSTRUCTIONS
Home Undelivered Discharge Instructions    After Discharge Orders: Follow up with physician on your next scheduled appointment            Diet:  normal diet as tolerated    Rest: on left side, normal activity as tolerated, no sex, no douching and no tub baths    Other instructions: Do kick counts once a day on your baby. Choose the time of day your baby is most active. Get in a comfortable lying or sitting position and time how long it takes to feel 10 kicks, twists, turns, swishes, or rolls.  Call your physician or midwife if there have not been 10 kicks in 2 hours    Call physician or midwife, return to Labor and Delivery, call 911, or go to the nearest Emergency Room if: increased leakage or fluid, contractions more than  6 per  1 hour, decreased fetal movement, persistent low back pain or cramping, bleeding from vaginal area, difficulty urinating, pain with urination, difficulty breathing, new calf pain, persistent headache or vision change

## 2022-10-21 NOTE — PROGRESS NOTES
Tracing reactive per Dr. Ortiz Frames. Dr Ewa Quiroz updated. Pt ok to be discharged home at this time.

## 2022-10-25 ENCOUNTER — ANCILLARY PROCEDURE (OUTPATIENT)
Dept: OBGYN CLINIC | Age: 37
End: 2022-10-25
Payer: COMMERCIAL

## 2022-10-25 ENCOUNTER — ROUTINE PRENATAL (OUTPATIENT)
Dept: OBGYN CLINIC | Age: 37
End: 2022-10-25
Payer: COMMERCIAL

## 2022-10-25 VITALS
HEART RATE: 102 BPM | BODY MASS INDEX: 46.84 KG/M2 | WEIGHT: 281.5 LBS | SYSTOLIC BLOOD PRESSURE: 145 MMHG | DIASTOLIC BLOOD PRESSURE: 96 MMHG

## 2022-10-25 DIAGNOSIS — O35.5XX0 SUSPECTED DAMAGE TO FETUS FROM DRUGS, AFFECTING MANAGEMENT OF MOTHER, SINGLE OR UNSPECIFIED FETUS: ICD-10-CM

## 2022-10-25 DIAGNOSIS — O99.212 MATERNAL MORBID OBESITY IN SECOND TRIMESTER, ANTEPARTUM (HCC): ICD-10-CM

## 2022-10-25 DIAGNOSIS — Z3A.37 37 WEEKS GESTATION OF PREGNANCY: ICD-10-CM

## 2022-10-25 DIAGNOSIS — Z98.890 HISTORY OF MYOMECTOMY: ICD-10-CM

## 2022-10-25 DIAGNOSIS — O13.3 PIH (PREGNANCY INDUCED HYPERTENSION), THIRD TRIMESTER: Primary | ICD-10-CM

## 2022-10-25 DIAGNOSIS — O24.414 INSULIN CONTROLLED GESTATIONAL DIABETES MELLITUS (GDM) IN THIRD TRIMESTER: ICD-10-CM

## 2022-10-25 DIAGNOSIS — O36.63X0 EXCESSIVE FETAL GROWTH AFFECTING MANAGEMENT OF MOTHER IN SINGLETON PREGNANCY IN THIRD TRIMESTER, ANTEPARTUM: ICD-10-CM

## 2022-10-25 DIAGNOSIS — E66.01 MATERNAL MORBID OBESITY IN SECOND TRIMESTER, ANTEPARTUM (HCC): ICD-10-CM

## 2022-10-25 LAB
GLUCOSE URINE, POC: NEGATIVE
PROTEIN UA: ABNORMAL

## 2022-10-25 PROCEDURE — 99213 OFFICE O/P EST LOW 20 MIN: CPT | Performed by: OBSTETRICS & GYNECOLOGY

## 2022-10-25 PROCEDURE — G8417 CALC BMI ABV UP PARAM F/U: HCPCS | Performed by: OBSTETRICS & GYNECOLOGY

## 2022-10-25 PROCEDURE — 76815 OB US LIMITED FETUS(S): CPT | Performed by: OBSTETRICS & GYNECOLOGY

## 2022-10-25 PROCEDURE — 1036F TOBACCO NON-USER: CPT | Performed by: OBSTETRICS & GYNECOLOGY

## 2022-10-25 PROCEDURE — 76820 UMBILICAL ARTERY ECHO: CPT | Performed by: OBSTETRICS & GYNECOLOGY

## 2022-10-25 PROCEDURE — G8484 FLU IMMUNIZE NO ADMIN: HCPCS | Performed by: OBSTETRICS & GYNECOLOGY

## 2022-10-25 PROCEDURE — 76818 FETAL BIOPHYS PROFILE W/NST: CPT | Performed by: OBSTETRICS & GYNECOLOGY

## 2022-10-25 PROCEDURE — 81002 URINALYSIS NONAUTO W/O SCOPE: CPT | Performed by: OBSTETRICS & GYNECOLOGY

## 2022-10-25 PROCEDURE — G8427 DOCREV CUR MEDS BY ELIG CLIN: HCPCS | Performed by: OBSTETRICS & GYNECOLOGY

## 2022-10-25 NOTE — PROGRESS NOTES
Pt here for pregnancy ultrasound   Pt states good fetal movement  Pt denies any bleeding/cramping/lof  Pt did not bring her blood sugars with her today

## 2022-10-25 NOTE — PATIENT INSTRUCTIONS
Please arrive for your scheduled appointment at least 15 minutes early with your actual insurance card+ a photo ID. Also if you need any refills ordered or have questions, it may take up 48 hours to reply. Please allow ample time for your refills. Call me when you use last refill. Thank you for your cooperation. You might be having an NST at your next appt. Please eat a large snack or breakfast before coming to office. Thank you Call your primary obstetrician with bleeding, leaking of fluid, abdominal tenderness, headache, blurry vision, epigastric pain and increased urinary frequency. Do kick counts after dinner. Call your primary obstetrician if less than 10 kicks in 2 hours after dinner. Call your primary obstetrician with bleeding, leaking of fluid, abdominal tenderness, headache, blurry vision, epigastric pain and increased urinary frequency. if you are sick, not feeling well or have an infectious process going on please reschedule your appointment by calling [phone]. Also if any family members are not feeling well, please do not bring them to your appointment. We appreciate your cooperation. We are doing this in order to protect our pregnant mothers+ their babies. if you are sick, not feeling well or have an infectious process going on please reschedule your appointment by calling [phone]. Also if any family members are not feeling well, please do not bring them to your appointment. We appreciate your cooperation. We are doing this in order to protect our pregnant mothers+ their babies.

## 2022-10-25 NOTE — PROGRESS NOTES
10/25/22     RE:  Aamir Rice   : 1985   AGE: 31 y.o. REFERRING PHYSICIANs:                                               Stephen Alfredo      Dear Jacqui Mrs. Aamir Rice a 31 y.o.  Radha Rubin  is seen today on follow up in our office. REASON FOR APPOINTMENT:  Follow-up on a morbidly obese patient with gestational diabetes, history of first trimester exposure to suvorexant (Belsomra)    MEDICATIONS:    [place] Gummies 2/day. Unisom once per day for treatment of morning sickness. Vitamin B6 for treatment of morning sickness. Iron supplement. Insulin Glargine 26 units subcu at bedtime. MEDICATIONS:    Prenatal Vitamins    INTERVAL HISTORY:  Since her last visit to our office, Ms Aamir Rice    Had a nonstress test 10/21/2022 that was nonreactive, she was sent to the labor unit of [facility] in [facility] for further monitoring. She was evaluated by Dr. Diana Art, was reassured and sent home. When seen today in our office she had no complaints. She reported good fetal movements. She denied having headache blurred vision or abdominal pain. PHYSICAL EXAMINATION:  General Appearance:  Healthy looking, alert, no acute distress. Eyes:     No pallor, no icterus, no photophobia. Ears:     No ear drainage. Nose:     No nasal drainage, no paranasal sinus tenderness. Throat:   Mucosa moist, no oral thrush, no exudate. Neck:     No nuchal rigidity. Back:     No CVA tenderness. Abdomen:    Soft nontender. Extremities:    No pretibial pitting edema, no calf muscle tenderness. Skin:     No rashes, no lesions. BP: (!) 146/93, Repeated BP: (!) 137/93 Weight: 281 lb 8 oz (127.7 kg)   Heart Rate: (!) 102     Body mass index is 46.84 kg/m². Urine dipstick:  Glucose : Negative   Albumin:  1+       An ultrasound evaluation was done in our office today.    I reviewed the ultrasound pictures stored in the hard drive of the ultrasound machine with the patient. Please refer to the enclosed copy of the ultrasound report for further information. Chart and Lab Work Review:    She did not bring her log book. She said that she has adequate sugar control with fasting sugars under 92 and 2hr pp under 120 mg/dl. IMPRESSION:  A  37w0d  intrauterine gestation. Pregnancy-induced hypertension. Gestational diabetes. Excessive fetal growth. Short cervix on treatment with vaginal suppositories of progesterone. Morbid maternal obesity. History of anxiety and depression, not requiring treatment at present (stopped Belsomra)  First trimester exposure to [facility]), stopped. S/p myomectomy in  in [place], followed by normal vaginal delivery  with Dr. Sommer Doe. RECOMMENDATIONS/PLAN:  I discussed with the patient the following points: The size of her baby is at the [address] [facility] percentile for gestational age. Her sugars are well-controlled. Her fasting should be kept under 92 mg/dl, 2-hour postprandial should be kept under 120. She is to stay on the  ADA diet, and  insulin. If needed, she should continue to go up daily by 2 units on the insulin glargine until next days fasting is less than 92 mg/dl. Poor sugar controI is associated with an increased risk of developing  complications such as delayed maturation of the lungs, electrolyte imbalance, seizures, and jaundice. There is also an increased risk of delivering prematurely and an increased risk of having a large for date baby. She should continue to restrict her activity. She is not to lift more than 15Lb weight, and should not be on her feet more than 2hrs per day. She should abstain from sexual relations. Fetal well-being was confirmed today.   The amount of fluid around baby is normal.  The Biophysical profile score of 10/10 is reassuring, and the umbilical artery Doppler studies are normal.  She should monitor fetal well-being at home by counting movements after dinner. Her baby should  move 10 times in 2 hours; otherwise, she should call your office immediately. She is also to call, if she develops any headaches, blurred vision, abdominal pain, bleeding, or spotting, which are signs of preeclampsia. She is to continue to follow with you in your office for ongoing obstetric care, and continue to be monitored in your office with nonstress test every Friday for remainder of pregnancy  Due to the multiple comorbidities (PIH, gestational diabetes, morbid obesity) I recommend delivery at 45 completed weeks or earlier if there is evidence of fetal jeopardy. Thank you again, doctor, for allowing us to be of service to your patient. If I can be of further assistance, please do not hesitate to call. Sincerely,        Tray Silva M.D., Nolberto Brito    The total time in minutes spent reviewing medical records, reviewing imaging studies, performing ultrasonic imaging, reviewing laboratory testing, and documenting information was  25  minutes, of which, 50% of the time was spent in patient education, counseling, and coordinating care with the patient, her provider, and/or her family. I answered all of her questions to her satisfaction. Current encounter billing:  VT OFFICE OUTPATIENT VISIT LOW MDM 20-30 MINUTES [[id number]]  US OB 1 or More Fetus Limited [24031 Custom]  Biophysical profile [OBO12 Custom]  US Doppler Fetal Umbilical Artery [[id number] Custom]    **This report has been created using voice recognition software. It may contain minor errors     which are inherent in voice recognition technology**                NON STRESS TEST INTERPRETATION    10/25/22    RE:  Luz Maria Matthews   : 1985   AGE: 31 y.o. GESTATIONAL AGE:  37w0d    DIAGNOSIS:     Pregnancy-induced hypertension. Gestational diabetes. INDICATION:  Pregnancy-induced hypertension.     TIME ON:  3:47 PM      TIME OFF:  4:08 PM      RESULT:   REACTIVE      FHR Baseline Rate:   130 bpm    PERIODIC CHANGES:    Accelerations present, variability moderate, no decelerations noted    COMMENTS:      She is to continue to follow with you in your office for ongoing obstetric care, and continue to be monitored in your office with nonstress test every Friday for remainder of pregnancy  Due to the multiple comorbidities (PIH, gestational diabetes, morbid obesity) I recommend delivery at 45 completed weeks or earlier if there is evidence of fetal jeopardy.       Yonatan Thompson MD

## 2022-10-25 NOTE — PROGRESS NOTES
nst started @1547. Pt instructed to sreedhar fetal movement  FHTs 130s with accels present, moderate varibility and no decels  Ended @ 1618.   Reactive per Dr Daina Preciado

## 2022-10-25 NOTE — LETTER
10/25/22     RE:  Courtney Raygoza   : 1985   AGE: 31 y.o. REFERRING PHYSICIANs:                                               Sweetie Tello      Dear Jacqui Mrs. Courtney Raygoza a 31 y.o.  Derek Harvey  is seen today on follow up in our office. REASON FOR APPOINTMENT:  · Follow-up on a morbidly obese patient with gestational diabetes, history of first trimester exposure to suvorexant (Belsomra)    MEDICATIONS:     [place] Gummies 2/day.  Unisom once per day for treatment of morning sickness.  Vitamin B6 for treatment of morning sickness.  Iron supplement.  Insulin Glargine 26 units subcu at bedtime. MEDICATIONS:    Prenatal Vitamins    INTERVAL HISTORY:  Since her last visit to our office, Ms Lila Philippe Had a nonstress test 10/21/2022 that was nonreactive, she was sent to the labor unit of [facility] in [facility] for further monitoring. She was evaluated by Dr. Saadia Lemons, was reassured and sent home. When seen today in our office she had no complaints. She reported good fetal movements. She denied having headache blurred vision or abdominal pain. PHYSICAL EXAMINATION:  General Appearance:  Healthy looking, alert, no acute distress. Eyes:     No pallor, no icterus, no photophobia. Ears:     No ear drainage. Nose:     No nasal drainage, no paranasal sinus tenderness. Throat:   Mucosa moist, no oral thrush, no exudate. Neck:     No nuchal rigidity. Back:     No CVA tenderness. Abdomen:    Soft nontender. Extremities:    No pretibial pitting edema, no calf muscle tenderness. Skin:     No rashes, no lesions. BP: (!) 146/93, Repeated BP: (!) 137/93 Weight: 281 lb 8 oz (127.7 kg)   Heart Rate: (!) 102     Body mass index is 46.84 kg/m². Urine dipstick:  Glucose : Negative   Albumin:  1+       An ultrasound evaluation was done in our office today.    I reviewed the ultrasound pictures stored in the hard drive of the ultrasound machine with the patient. Please refer to the enclosed copy of the ultrasound report for further information. Chart and Lab Work Review:    She did not bring her log book. She said that she has adequate sugar control with fasting sugars under 92 and 2hr pp under 120 mg/dl. IMPRESSION:  1. A  37w0d  intrauterine gestation. 2. Pregnancy-induced hypertension. 3. Gestational diabetes. 4. Excessive fetal growth. 5. Short cervix on treatment with vaginal suppositories of progesterone. 6.  Morbid maternal obesity. 7. History of anxiety and depression, not requiring treatment at present (stopped Belsomra)  8. First trimester exposure to [facility]), stopped. 9. S/p myomectomy in  in [place], followed by normal vaginal delivery  with Dr. Nava Simon. RECOMMENDATIONS/PLAN:  I discussed with the patient the following points:    1. The size of her baby is at the [address] [facility] percentile for gestational age. 2. Her sugars are well-controlled. Her fasting should be kept under 92 mg/dl, 2-hour postprandial should be kept under 120. She is to stay on the  ADA diet, and  insulin. If needed, she should continue to go up daily by 2 units on the insulin glargine until next days fasting is less than 92 mg/dl. 3. Poor sugar controI is associated with an increased risk of developing  complications such as delayed maturation of the lungs, electrolyte imbalance, seizures, and jaundice. There is also an increased risk of delivering prematurely and an increased risk of having a large for date baby. 4. She should continue to restrict her activity. She is not to lift more than 15Lb weight, and should not be on her feet more than 2hrs per day. She should abstain from sexual relations. 5. Fetal well-being was confirmed today.   The amount of fluid around baby is normal.  The Biophysical profile score of 10/10 is reassuring, and the umbilical artery Doppler studies are normal.  6. She should monitor fetal well-being at home by counting movements after dinner. Her baby should  move 10 times in 2 hours; otherwise, she should call your office immediately. She is also to call, if she develops any headaches, blurred vision, abdominal pain, bleeding, or spotting, which are signs of preeclampsia. 7. She is to continue to follow with you in your office for ongoing obstetric care, and continue to be monitored in your office with nonstress test every Friday for remainder of pregnancy  8. Due to the multiple comorbidities (PIH, gestational diabetes, morbid obesity) I recommend delivery at 45 completed weeks or earlier if there is evidence of fetal jeopardy. Thank you again, doctor, for allowing us to be of service to your patient. If I can be of further assistance, please do not hesitate to call. Sincerely,        Damien Alvarez M.D., [facility]    The total time in minutes spent reviewing medical records, reviewing imaging studies, performing ultrasonic imaging, reviewing laboratory testing, and documenting information was  25  minutes, of which, 50% of the time was spent in patient education, counseling, and coordinating care with the patient, her provider, and/or her family. I answered all of her questions to her satisfaction. Current encounter billing:  [place] OFFICE OUTPATIENT VISIT LOW MDM 20-30 MINUTES [[id number]]  US OB 1 or More Fetus Limited [39004 Custom]  Biophysical profile [OBO12 Custom]  US Doppler Fetal Umbilical Artery [[id number] Custom]    **This report has been created using voice recognition software. It may contain minor errors     which are inherent in voice recognition technology**                NON STRESS TEST INTERPRETATION    10/25/22    RE:  Carolann Paulson   : 1985   AGE: 31 y.o. GESTATIONAL AGE:  37w0d    DIAGNOSIS:     Pregnancy-induced hypertension. Gestational diabetes. INDICATION:  Pregnancy-induced hypertension.     TIME ON:  3:47 PM      TIME OFF:  4:08 PM      RESULT:   REACTIVE      FHR Baseline Rate:   130 bpm    PERIODIC CHANGES:    · Accelerations present, variability moderate, no decelerations noted    COMMENTS:       She is to continue to follow with you in your office for ongoing obstetric care, and continue to be monitored in your office with nonstress test every Friday for remainder of pregnancy   Due to the multiple comorbidities (PIH, gestational diabetes, morbid obesity) I recommend delivery at 45 completed weeks or earlier if there is evidence of fetal jeopardy.       Chandrika Malone MD

## 2022-11-01 ENCOUNTER — ANCILLARY PROCEDURE (OUTPATIENT)
Dept: OBGYN CLINIC | Age: 37
End: 2022-11-01
Payer: COMMERCIAL

## 2022-11-01 ENCOUNTER — APPOINTMENT (OUTPATIENT)
Dept: GENERAL RADIOLOGY | Age: 37
End: 2022-11-01
Payer: COMMERCIAL

## 2022-11-01 ENCOUNTER — ANESTHESIA EVENT (OUTPATIENT)
Dept: LABOR AND DELIVERY | Age: 37
End: 2022-11-01
Payer: COMMERCIAL

## 2022-11-01 ENCOUNTER — HOSPITAL ENCOUNTER (INPATIENT)
Age: 37
LOS: 3 days | Discharge: HOME | End: 2022-11-04
Attending: OBSTETRICS & GYNECOLOGY | Admitting: OBSTETRICS & GYNECOLOGY
Payer: COMMERCIAL

## 2022-11-01 ENCOUNTER — ANESTHESIA (OUTPATIENT)
Dept: LABOR AND DELIVERY | Age: 37
End: 2022-11-01
Payer: COMMERCIAL

## 2022-11-01 ENCOUNTER — ROUTINE PRENATAL (OUTPATIENT)
Dept: OBGYN CLINIC | Age: 37
End: 2022-11-01
Payer: COMMERCIAL

## 2022-11-01 VITALS
BODY MASS INDEX: 47.59 KG/M2 | WEIGHT: 286 LBS | SYSTOLIC BLOOD PRESSURE: 162 MMHG | DIASTOLIC BLOOD PRESSURE: 97 MMHG | HEART RATE: 104 BPM

## 2022-11-01 DIAGNOSIS — Z98.890 HISTORY OF MYOMECTOMY: Primary | ICD-10-CM

## 2022-11-01 DIAGNOSIS — O26.879 SHORT CERVIX AFFECTING PREGNANCY: ICD-10-CM

## 2022-11-01 DIAGNOSIS — O24.414 INSULIN CONTROLLED GESTATIONAL DIABETES MELLITUS (GDM) IN THIRD TRIMESTER: ICD-10-CM

## 2022-11-01 DIAGNOSIS — G89.18 POST-OP PAIN: Primary | ICD-10-CM

## 2022-11-01 DIAGNOSIS — Z3A.38 38 WEEKS GESTATION OF PREGNANCY: ICD-10-CM

## 2022-11-01 PROBLEM — O13.3 GESTATIONAL HYPERTENSION, THIRD TRIMESTER: Status: ACTIVE | Noted: 2022-11-01

## 2022-11-01 PROBLEM — B34.9 ACUTE BRONCHOSPASM DUE TO VIRAL INFECTION: Status: ACTIVE | Noted: 2022-11-01

## 2022-11-01 PROBLEM — O12.13 PROTEINURIA AFFECTING PREGNANCY IN THIRD TRIMESTER: Status: ACTIVE | Noted: 2022-11-01

## 2022-11-01 PROBLEM — O13.1 PIH (PREGNANCY INDUCED HYPERTENSION), FIRST TRIMESTER: Status: ACTIVE | Noted: 2022-11-01

## 2022-11-01 PROBLEM — R06.00 ACUTE DYSPNEA: Status: ACTIVE | Noted: 2022-11-01

## 2022-11-01 PROBLEM — J98.01 ACUTE BRONCHOSPASM DUE TO VIRAL INFECTION: Status: ACTIVE | Noted: 2022-11-01

## 2022-11-01 LAB
ABO/RH: NORMAL
ADENOVIRUS BY PCR: NOT DETECTED
ALBUMIN SERPL-MCNC: 3.1 G/DL (ref 3.5–5.2)
ALP BLD-CCNC: 206 U/L (ref 35–104)
ALT SERPL-CCNC: 14 U/L (ref 0–32)
AMPHETAMINE SCREEN, URINE: NOT DETECTED
ANION GAP SERPL CALCULATED.3IONS-SCNC: 13 MMOL/L (ref 7–16)
ANTIBODY SCREEN: NORMAL
APTT: 28.7 SEC (ref 24.5–35.1)
AST SERPL-CCNC: 25 U/L (ref 0–31)
BARBITURATE SCREEN URINE: NOT DETECTED
BENZODIAZEPINE SCREEN, URINE: NOT DETECTED
BILIRUB SERPL-MCNC: 0.3 MG/DL (ref 0–1.2)
BORDETELLA PARAPERTUSSIS BY PCR: NOT DETECTED
BORDETELLA PERTUSSIS BY PCR: NOT DETECTED
BUN BLDV-MCNC: 9 MG/DL (ref 6–20)
CALCIUM SERPL-MCNC: 9.4 MG/DL (ref 8.6–10.2)
CANNABINOID SCREEN URINE: NOT DETECTED
CHLAMYDOPHILIA PNEUMONIAE BY PCR: NOT DETECTED
CHLORIDE BLD-SCNC: 104 MMOL/L (ref 98–107)
CO2: 20 MMOL/L (ref 22–29)
COCAINE METABOLITE SCREEN URINE: NOT DETECTED
CORONAVIRUS 229E BY PCR: NOT DETECTED
CORONAVIRUS HKU1 BY PCR: NOT DETECTED
CORONAVIRUS NL63 BY PCR: NOT DETECTED
CORONAVIRUS OC43 BY PCR: NOT DETECTED
CREAT SERPL-MCNC: 0.8 MG/DL (ref 0.5–1)
CREATININE URINE: 383 MG/DL (ref 29–226)
FENTANYL SCREEN, URINE: NOT DETECTED
FIBRINOGEN: 612 MG/DL (ref 200–400)
GFR SERPL CREATININE-BSD FRML MDRD: >60 ML/MIN/1.73
GLUCOSE BLD-MCNC: 92 MG/DL (ref 74–99)
GLUCOSE URINE, POC: NEGATIVE
HCT VFR BLD CALC: 33.3 % (ref 34–48)
HEMOGLOBIN: 10.6 G/DL (ref 11.5–15.5)
HUMAN METAPNEUMOVIRUS BY PCR: NOT DETECTED
HUMAN RHINOVIRUS/ENTEROVIRUS BY PCR: NOT DETECTED
INFLUENZA A BY PCR: NOT DETECTED
INFLUENZA A BY PCR: NOT DETECTED
INFLUENZA B BY PCR: NOT DETECTED
INFLUENZA B BY PCR: NOT DETECTED
INR BLD: 0.9
Lab: NORMAL
MCH RBC QN AUTO: 27.6 PG (ref 26–35)
MCHC RBC AUTO-ENTMCNC: 31.8 % (ref 32–34.5)
MCV RBC AUTO: 86.7 FL (ref 80–99.9)
METER GLUCOSE: 80 MG/DL (ref 74–99)
METHADONE SCREEN, URINE: NOT DETECTED
MYCOPLASMA PNEUMONIAE BY PCR: NOT DETECTED
OPIATE SCREEN URINE: NOT DETECTED
OXYCODONE URINE: NOT DETECTED
PARAINFLUENZA VIRUS 1 BY PCR: NOT DETECTED
PARAINFLUENZA VIRUS 2 BY PCR: NOT DETECTED
PARAINFLUENZA VIRUS 3 BY PCR: NOT DETECTED
PARAINFLUENZA VIRUS 4 BY PCR: NOT DETECTED
PDW BLD-RTO: 18.2 FL (ref 11.5–15)
PHENCYCLIDINE SCREEN URINE: NOT DETECTED
PLATELET # BLD: 216 E9/L (ref 130–450)
PMV BLD AUTO: 11.7 FL (ref 7–12)
POTASSIUM SERPL-SCNC: 4.3 MMOL/L (ref 3.5–5)
PROTEIN PROTEIN: 252 MG/DL (ref 0–12)
PROTEIN UA: ABNORMAL
PROTEIN/CREAT RATIO: 0.7
PROTEIN/CREAT RATIO: 0.7 (ref 0–0.2)
PROTHROMBIN TIME: 10 SEC (ref 9.3–12.4)
RBC # BLD: 3.84 E12/L (ref 3.5–5.5)
RESPIRATORY SYNCYTIAL VIRUS BY PCR: NOT DETECTED
SARS-COV-2, NAAT: NOT DETECTED
SARS-COV-2, PCR: NOT DETECTED
SODIUM BLD-SCNC: 137 MMOL/L (ref 132–146)
TOTAL PROTEIN: 6.3 G/DL (ref 6.4–8.3)
WBC # BLD: 9.9 E9/L (ref 4.5–11.5)

## 2022-11-01 PROCEDURE — 81002 URINALYSIS NONAUTO W/O SCOPE: CPT | Performed by: OBSTETRICS & GYNECOLOGY

## 2022-11-01 PROCEDURE — 80307 DRUG TEST PRSMV CHEM ANLYZR: CPT

## 2022-11-01 PROCEDURE — 76815 OB US LIMITED FETUS(S): CPT | Performed by: OBSTETRICS & GYNECOLOGY

## 2022-11-01 PROCEDURE — 86900 BLOOD TYPING SEROLOGIC ABO: CPT

## 2022-11-01 PROCEDURE — 3609079900 HC CESAREAN SECTION: Performed by: OBSTETRICS & GYNECOLOGY

## 2022-11-01 PROCEDURE — 86850 RBC ANTIBODY SCREEN: CPT

## 2022-11-01 PROCEDURE — 6360000002 HC RX W HCPCS: Performed by: OBSTETRICS & GYNECOLOGY

## 2022-11-01 PROCEDURE — 76821 MIDDLE CEREBRAL ARTERY ECHO: CPT | Performed by: OBSTETRICS & GYNECOLOGY

## 2022-11-01 PROCEDURE — 3700000001 HC ADD 15 MINUTES (ANESTHESIA): Performed by: OBSTETRICS & GYNECOLOGY

## 2022-11-01 PROCEDURE — 82570 ASSAY OF URINE CREATININE: CPT

## 2022-11-01 PROCEDURE — 99221 1ST HOSP IP/OBS SF/LOW 40: CPT | Performed by: OBSTETRICS & GYNECOLOGY

## 2022-11-01 PROCEDURE — 6370000000 HC RX 637 (ALT 250 FOR IP): Performed by: ANESTHESIOLOGY

## 2022-11-01 PROCEDURE — 2500000003 HC RX 250 WO HCPCS: Performed by: NURSE ANESTHETIST, CERTIFIED REGISTERED

## 2022-11-01 PROCEDURE — 88307 TISSUE EXAM BY PATHOLOGIST: CPT

## 2022-11-01 PROCEDURE — 76818 FETAL BIOPHYS PROFILE W/NST: CPT | Performed by: OBSTETRICS & GYNECOLOGY

## 2022-11-01 PROCEDURE — G8484 FLU IMMUNIZE NO ADMIN: HCPCS | Performed by: OBSTETRICS & GYNECOLOGY

## 2022-11-01 PROCEDURE — 6370000000 HC RX 637 (ALT 250 FOR IP): Performed by: FAMILY MEDICINE

## 2022-11-01 PROCEDURE — 87502 INFLUENZA DNA AMP PROBE: CPT

## 2022-11-01 PROCEDURE — 99213 OFFICE O/P EST LOW 20 MIN: CPT | Performed by: OBSTETRICS & GYNECOLOGY

## 2022-11-01 PROCEDURE — 2709999900 HC NON-CHARGEABLE SUPPLY: Performed by: OBSTETRICS & GYNECOLOGY

## 2022-11-01 PROCEDURE — 84156 ASSAY OF PROTEIN URINE: CPT

## 2022-11-01 PROCEDURE — 1220000001 HC SEMI PRIVATE L&D R&B

## 2022-11-01 PROCEDURE — 0202U NFCT DS 22 TRGT SARS-COV-2: CPT

## 2022-11-01 PROCEDURE — 85027 COMPLETE CBC AUTOMATED: CPT

## 2022-11-01 PROCEDURE — 80053 COMPREHEN METABOLIC PANEL: CPT

## 2022-11-01 PROCEDURE — 2580000003 HC RX 258: Performed by: NURSE ANESTHETIST, CERTIFIED REGISTERED

## 2022-11-01 PROCEDURE — 6370000000 HC RX 637 (ALT 250 FOR IP): Performed by: OBSTETRICS & GYNECOLOGY

## 2022-11-01 PROCEDURE — 85610 PROTHROMBIN TIME: CPT

## 2022-11-01 PROCEDURE — 87635 SARS-COV-2 COVID-19 AMP PRB: CPT

## 2022-11-01 PROCEDURE — 3700000000 HC ANESTHESIA ATTENDED CARE: Performed by: OBSTETRICS & GYNECOLOGY

## 2022-11-01 PROCEDURE — 76820 UMBILICAL ARTERY ECHO: CPT | Performed by: OBSTETRICS & GYNECOLOGY

## 2022-11-01 PROCEDURE — 82962 GLUCOSE BLOOD TEST: CPT

## 2022-11-01 PROCEDURE — 7100000001 HC PACU RECOVERY - ADDTL 15 MIN: Performed by: OBSTETRICS & GYNECOLOGY

## 2022-11-01 PROCEDURE — 86901 BLOOD TYPING SEROLOGIC RH(D): CPT

## 2022-11-01 PROCEDURE — 85730 THROMBOPLASTIN TIME PARTIAL: CPT

## 2022-11-01 PROCEDURE — G8417 CALC BMI ABV UP PARAM F/U: HCPCS | Performed by: OBSTETRICS & GYNECOLOGY

## 2022-11-01 PROCEDURE — 7100000000 HC PACU RECOVERY - FIRST 15 MIN: Performed by: OBSTETRICS & GYNECOLOGY

## 2022-11-01 PROCEDURE — G8427 DOCREV CUR MEDS BY ELIG CLIN: HCPCS | Performed by: OBSTETRICS & GYNECOLOGY

## 2022-11-01 PROCEDURE — 85384 FIBRINOGEN ACTIVITY: CPT

## 2022-11-01 PROCEDURE — 1036F TOBACCO NON-USER: CPT | Performed by: OBSTETRICS & GYNECOLOGY

## 2022-11-01 PROCEDURE — 99223 1ST HOSP IP/OBS HIGH 75: CPT | Performed by: FAMILY MEDICINE

## 2022-11-01 PROCEDURE — 71045 X-RAY EXAM CHEST 1 VIEW: CPT

## 2022-11-01 PROCEDURE — 6360000002 HC RX W HCPCS: Performed by: NURSE ANESTHETIST, CERTIFIED REGISTERED

## 2022-11-01 PROCEDURE — 6360000002 HC RX W HCPCS: Performed by: ANESTHESIOLOGY

## 2022-11-01 PROCEDURE — 2500000003 HC RX 250 WO HCPCS: Performed by: OBSTETRICS & GYNECOLOGY

## 2022-11-01 PROCEDURE — 36415 COLL VENOUS BLD VENIPUNCTURE: CPT

## 2022-11-01 PROCEDURE — 2580000003 HC RX 258: Performed by: OBSTETRICS & GYNECOLOGY

## 2022-11-01 RX ORDER — MAGNESIUM SULFATE IN WATER 40 MG/ML
INJECTION, SOLUTION INTRAVENOUS
Status: DISPENSED
Start: 2022-11-01 | End: 2022-11-02

## 2022-11-01 RX ORDER — SODIUM CHLORIDE 0.9 % (FLUSH) 0.9 %
5-40 SYRINGE (ML) INJECTION PRN
Status: DISCONTINUED | OUTPATIENT
Start: 2022-11-01 | End: 2022-11-04 | Stop reason: HOSPADM

## 2022-11-01 RX ORDER — PHYTONADIONE 1 MG/.5ML
INJECTION, EMULSION INTRAMUSCULAR; INTRAVENOUS; SUBCUTANEOUS
Status: DISPENSED
Start: 2022-11-01 | End: 2022-11-02

## 2022-11-01 RX ORDER — FENTANYL CITRATE 50 UG/ML
25 INJECTION, SOLUTION INTRAMUSCULAR; INTRAVENOUS EVERY 5 MIN PRN
Status: DISCONTINUED | OUTPATIENT
Start: 2022-11-01 | End: 2022-11-02 | Stop reason: HOSPADM

## 2022-11-01 RX ORDER — MODIFIED LANOLIN
OINTMENT (GRAM) TOPICAL
Status: DISCONTINUED | OUTPATIENT
Start: 2022-11-01 | End: 2022-11-04 | Stop reason: HOSPADM

## 2022-11-01 RX ORDER — SODIUM CHLORIDE, SODIUM LACTATE, POTASSIUM CHLORIDE, CALCIUM CHLORIDE 600; 310; 30; 20 MG/100ML; MG/100ML; MG/100ML; MG/100ML
INJECTION, SOLUTION INTRAVENOUS CONTINUOUS
Status: DISCONTINUED | OUTPATIENT
Start: 2022-11-01 | End: 2022-11-01

## 2022-11-01 RX ORDER — HYDRALAZINE HYDROCHLORIDE 20 MG/ML
10 INJECTION INTRAMUSCULAR; INTRAVENOUS
Status: ACTIVE | OUTPATIENT
Start: 2022-11-01 | End: 2022-11-02

## 2022-11-01 RX ORDER — SODIUM CHLORIDE 9 MG/ML
INJECTION, SOLUTION INTRAVENOUS PRN
Status: DISCONTINUED | OUTPATIENT
Start: 2022-11-01 | End: 2022-11-04 | Stop reason: HOSPADM

## 2022-11-01 RX ORDER — ONDANSETRON 2 MG/ML
4 INJECTION INTRAMUSCULAR; INTRAVENOUS EVERY 6 HOURS PRN
Status: ACTIVE | OUTPATIENT
Start: 2022-11-01 | End: 2022-11-02

## 2022-11-01 RX ORDER — ONDANSETRON 2 MG/ML
4 INJECTION INTRAMUSCULAR; INTRAVENOUS EVERY 6 HOURS PRN
Status: DISCONTINUED | OUTPATIENT
Start: 2022-11-01 | End: 2022-11-01

## 2022-11-01 RX ORDER — TRISODIUM CITRATE DIHYDRATE AND CITRIC ACID MONOHYDRATE 500; 334 MG/5ML; MG/5ML
30 SOLUTION ORAL ONCE
Status: COMPLETED | OUTPATIENT
Start: 2022-11-01 | End: 2022-11-01

## 2022-11-01 RX ORDER — MAGNESIUM SULFATE HEPTAHYDRATE 40 MG/ML
4000 INJECTION, SOLUTION INTRAVENOUS ONCE
Status: COMPLETED | OUTPATIENT
Start: 2022-11-01 | End: 2022-11-01

## 2022-11-01 RX ORDER — LABETALOL HYDROCHLORIDE 5 MG/ML
40 INJECTION, SOLUTION INTRAVENOUS
Status: COMPLETED | OUTPATIENT
Start: 2022-11-01 | End: 2022-11-01

## 2022-11-01 RX ORDER — OXYCODONE HYDROCHLORIDE 5 MG/1
10 TABLET ORAL EVERY 4 HOURS PRN
Status: DISCONTINUED | OUTPATIENT
Start: 2022-11-01 | End: 2022-11-04 | Stop reason: HOSPADM

## 2022-11-01 RX ORDER — SODIUM CHLORIDE 9 MG/ML
INJECTION, SOLUTION INTRAVENOUS PRN
Status: DISCONTINUED | OUTPATIENT
Start: 2022-11-01 | End: 2022-11-02 | Stop reason: HOSPADM

## 2022-11-01 RX ORDER — SODIUM CHLORIDE 0.9 % (FLUSH) 0.9 %
5-40 SYRINGE (ML) INJECTION EVERY 12 HOURS SCHEDULED
Status: DISCONTINUED | OUTPATIENT
Start: 2022-11-01 | End: 2022-11-02 | Stop reason: HOSPADM

## 2022-11-01 RX ORDER — ALBUTEROL SULFATE 90 UG/1
2 AEROSOL, METERED RESPIRATORY (INHALATION) EVERY 4 HOURS
Status: DISCONTINUED | OUTPATIENT
Start: 2022-11-01 | End: 2022-11-02

## 2022-11-01 RX ORDER — IBUPROFEN 800 MG/1
800 TABLET ORAL EVERY 8 HOURS PRN
Status: DISCONTINUED | OUTPATIENT
Start: 2022-11-01 | End: 2022-11-04 | Stop reason: HOSPADM

## 2022-11-01 RX ORDER — DOCUSATE SODIUM 100 MG/1
100 CAPSULE, LIQUID FILLED ORAL 2 TIMES DAILY
Status: DISCONTINUED | OUTPATIENT
Start: 2022-11-01 | End: 2022-11-04 | Stop reason: HOSPADM

## 2022-11-01 RX ORDER — ERYTHROMYCIN 5 MG/G
OINTMENT OPHTHALMIC
Status: DISPENSED
Start: 2022-11-01 | End: 2022-11-02

## 2022-11-01 RX ORDER — MORPHINE SULFATE 1 MG/ML
INJECTION, SOLUTION EPIDURAL; INTRATHECAL; INTRAVENOUS PRN
Status: DISCONTINUED | OUTPATIENT
Start: 2022-11-01 | End: 2022-11-01 | Stop reason: SDUPTHER

## 2022-11-01 RX ORDER — SODIUM CHLORIDE, SODIUM LACTATE, POTASSIUM CHLORIDE, AND CALCIUM CHLORIDE .6; .31; .03; .02 G/100ML; G/100ML; G/100ML; G/100ML
1000 INJECTION, SOLUTION INTRAVENOUS ONCE
Status: COMPLETED | OUTPATIENT
Start: 2022-11-01 | End: 2022-11-01

## 2022-11-01 RX ORDER — SODIUM CHLORIDE 0.9 % (FLUSH) 0.9 %
5-40 SYRINGE (ML) INJECTION EVERY 12 HOURS SCHEDULED
Status: DISCONTINUED | OUTPATIENT
Start: 2022-11-01 | End: 2022-11-04 | Stop reason: HOSPADM

## 2022-11-01 RX ORDER — LABETALOL HYDROCHLORIDE 5 MG/ML
20 INJECTION, SOLUTION INTRAVENOUS
Status: COMPLETED | OUTPATIENT
Start: 2022-11-01 | End: 2022-11-01

## 2022-11-01 RX ORDER — PHENYLEPHRINE HCL IN 0.9% NACL 1 MG/10 ML
SYRINGE (ML) INTRAVENOUS PRN
Status: DISCONTINUED | OUTPATIENT
Start: 2022-11-01 | End: 2022-11-01 | Stop reason: SDUPTHER

## 2022-11-01 RX ORDER — SODIUM CHLORIDE, SODIUM LACTATE, POTASSIUM CHLORIDE, CALCIUM CHLORIDE 600; 310; 30; 20 MG/100ML; MG/100ML; MG/100ML; MG/100ML
INJECTION, SOLUTION INTRAVENOUS CONTINUOUS
Status: DISCONTINUED | OUTPATIENT
Start: 2022-11-01 | End: 2022-11-04 | Stop reason: HOSPADM

## 2022-11-01 RX ORDER — LABETALOL HYDROCHLORIDE 5 MG/ML
INJECTION, SOLUTION INTRAVENOUS
Status: DISPENSED
Start: 2022-11-01 | End: 2022-11-02

## 2022-11-01 RX ORDER — PROCHLORPERAZINE EDISYLATE 5 MG/ML
5 INJECTION INTRAMUSCULAR; INTRAVENOUS
Status: COMPLETED | OUTPATIENT
Start: 2022-11-01 | End: 2022-11-01

## 2022-11-01 RX ORDER — OXYCODONE HYDROCHLORIDE 5 MG/1
TABLET ORAL
Status: DISPENSED
Start: 2022-11-01 | End: 2022-11-02

## 2022-11-01 RX ORDER — SODIUM CHLORIDE 0.9 % (FLUSH) 0.9 %
5-40 SYRINGE (ML) INJECTION PRN
Status: DISCONTINUED | OUTPATIENT
Start: 2022-11-01 | End: 2022-11-02 | Stop reason: HOSPADM

## 2022-11-01 RX ORDER — BUPIVACAINE HYDROCHLORIDE 7.5 MG/ML
INJECTION, SOLUTION INTRASPINAL PRN
Status: DISCONTINUED | OUTPATIENT
Start: 2022-11-01 | End: 2022-11-01 | Stop reason: SDUPTHER

## 2022-11-01 RX ORDER — ACETAMINOPHEN 500 MG
1000 TABLET ORAL EVERY 8 HOURS PRN
Status: DISCONTINUED | OUTPATIENT
Start: 2022-11-01 | End: 2022-11-04 | Stop reason: HOSPADM

## 2022-11-01 RX ORDER — DIPHENHYDRAMINE HYDROCHLORIDE 50 MG/ML
25 INJECTION INTRAMUSCULAR; INTRAVENOUS EVERY 6 HOURS PRN
Status: ACTIVE | OUTPATIENT
Start: 2022-11-01 | End: 2022-11-02

## 2022-11-01 RX ORDER — CALCIUM GLUCONATE 94 MG/ML
1000 INJECTION, SOLUTION INTRAVENOUS PRN
Status: DISCONTINUED | OUTPATIENT
Start: 2022-11-01 | End: 2022-11-04 | Stop reason: HOSPADM

## 2022-11-01 RX ORDER — FENTANYL CITRATE 50 UG/ML
50 INJECTION, SOLUTION INTRAMUSCULAR; INTRAVENOUS EVERY 5 MIN PRN
Status: DISCONTINUED | OUTPATIENT
Start: 2022-11-01 | End: 2022-11-02 | Stop reason: HOSPADM

## 2022-11-01 RX ORDER — OXYCODONE HYDROCHLORIDE 5 MG/1
5 TABLET ORAL EVERY 4 HOURS PRN
Status: ACTIVE | OUTPATIENT
Start: 2022-11-01 | End: 2022-11-02

## 2022-11-01 RX ORDER — SODIUM CHLORIDE, SODIUM LACTATE, POTASSIUM CHLORIDE, CALCIUM CHLORIDE 600; 310; 30; 20 MG/100ML; MG/100ML; MG/100ML; MG/100ML
INJECTION, SOLUTION INTRAVENOUS CONTINUOUS PRN
Status: DISCONTINUED | OUTPATIENT
Start: 2022-11-01 | End: 2022-11-01 | Stop reason: SDUPTHER

## 2022-11-01 RX ORDER — OXYCODONE HYDROCHLORIDE 5 MG/1
5 TABLET ORAL EVERY 4 HOURS PRN
Status: DISCONTINUED | OUTPATIENT
Start: 2022-11-01 | End: 2022-11-04 | Stop reason: HOSPADM

## 2022-11-01 RX ORDER — SODIUM CHLORIDE 9 MG/ML
INJECTION, SOLUTION INTRAVENOUS PRN
Status: DISCONTINUED | OUTPATIENT
Start: 2022-11-01 | End: 2022-11-01

## 2022-11-01 RX ORDER — ONDANSETRON 2 MG/ML
INJECTION INTRAMUSCULAR; INTRAVENOUS PRN
Status: DISCONTINUED | OUTPATIENT
Start: 2022-11-01 | End: 2022-11-01 | Stop reason: SDUPTHER

## 2022-11-01 RX ORDER — LABETALOL HYDROCHLORIDE 5 MG/ML
80 INJECTION, SOLUTION INTRAVENOUS
Status: COMPLETED | OUTPATIENT
Start: 2022-11-01 | End: 2022-11-01

## 2022-11-01 RX ORDER — OXYCODONE HYDROCHLORIDE 5 MG/1
10 TABLET ORAL EVERY 4 HOURS PRN
Status: DISPENSED | OUTPATIENT
Start: 2022-11-01 | End: 2022-11-02

## 2022-11-01 RX ORDER — SODIUM CHLORIDE 0.9 % (FLUSH) 0.9 %
10 SYRINGE (ML) INJECTION PRN
Status: DISCONTINUED | OUTPATIENT
Start: 2022-11-01 | End: 2022-11-01

## 2022-11-01 RX ORDER — DIPHENHYDRAMINE HCL 25 MG
25 TABLET ORAL EVERY 6 HOURS PRN
Status: ACTIVE | OUTPATIENT
Start: 2022-11-01 | End: 2022-11-02

## 2022-11-01 RX ORDER — SODIUM CHLORIDE 0.9 % (FLUSH) 0.9 %
10 SYRINGE (ML) INJECTION EVERY 12 HOURS SCHEDULED
Status: DISCONTINUED | OUTPATIENT
Start: 2022-11-01 | End: 2022-11-01

## 2022-11-01 RX ADMIN — LABETALOL HYDROCHLORIDE 80 MG: 5 INJECTION INTRAVENOUS at 22:16

## 2022-11-01 RX ADMIN — ONDANSETRON 4 MG: 2 INJECTION INTRAMUSCULAR; INTRAVENOUS at 18:15

## 2022-11-01 RX ADMIN — FENTANYL CITRATE 50 MCG: 50 INJECTION, SOLUTION INTRAMUSCULAR; INTRAVENOUS at 20:10

## 2022-11-01 RX ADMIN — MORPHINE SULFATE 0.15 MG: 1 INJECTION, SOLUTION EPIDURAL; INTRATHECAL; INTRAVENOUS at 18:19

## 2022-11-01 RX ADMIN — Medication 250 ML: at 18:38

## 2022-11-01 RX ADMIN — Medication 100 MCG: at 18:31

## 2022-11-01 RX ADMIN — ALBUTEROL SULFATE 2 PUFF: 90 AEROSOL, METERED RESPIRATORY (INHALATION) at 17:53

## 2022-11-01 RX ADMIN — Medication 100 MCG: at 18:44

## 2022-11-01 RX ADMIN — MAGNESIUM SULFATE HEPTAHYDRATE 4000 MG: 40 INJECTION, SOLUTION INTRAVENOUS at 21:10

## 2022-11-01 RX ADMIN — SODIUM CITRATE AND CITRIC ACID MONOHYDRATE 30 ML: 500; 334 SOLUTION ORAL at 17:49

## 2022-11-01 RX ADMIN — PROCHLORPERAZINE EDISYLATE 5 MG: 5 INJECTION INTRAMUSCULAR; INTRAVENOUS at 20:08

## 2022-11-01 RX ADMIN — SODIUM CHLORIDE, POTASSIUM CHLORIDE, SODIUM LACTATE AND CALCIUM CHLORIDE: 600; 310; 30; 20 INJECTION, SOLUTION INTRAVENOUS at 18:10

## 2022-11-01 RX ADMIN — LABETALOL HYDROCHLORIDE 40 MG: 5 INJECTION INTRAVENOUS at 21:43

## 2022-11-01 RX ADMIN — SODIUM CHLORIDE, POTASSIUM CHLORIDE, SODIUM LACTATE AND CALCIUM CHLORIDE 1000 ML: 600; 310; 30; 20 INJECTION, SOLUTION INTRAVENOUS at 17:45

## 2022-11-01 RX ADMIN — SODIUM CHLORIDE, POTASSIUM CHLORIDE, SODIUM LACTATE AND CALCIUM CHLORIDE: 600; 310; 30; 20 INJECTION, SOLUTION INTRAVENOUS at 18:39

## 2022-11-01 RX ADMIN — OXYCODONE 5 MG: 5 TABLET ORAL at 21:56

## 2022-11-01 RX ADMIN — BUPIVACAINE HYDROCHLORIDE 1.6 ML: 7.5 INJECTION, SOLUTION SUBARACHNOID at 18:19

## 2022-11-01 RX ADMIN — CEFAZOLIN 3000 MG: 10 INJECTION, POWDER, FOR SOLUTION INTRAVENOUS at 17:47

## 2022-11-01 RX ADMIN — LABETALOL HYDROCHLORIDE 20 MG: 5 INJECTION INTRAVENOUS at 21:08

## 2022-11-01 RX ADMIN — Medication 100 MCG: at 18:23

## 2022-11-01 ASSESSMENT — PAIN SCALES - GENERAL
PAINLEVEL_OUTOF10: 6
PAINLEVEL_OUTOF10: 7

## 2022-11-01 ASSESSMENT — PAIN DESCRIPTION - DESCRIPTORS
DESCRIPTORS: ACHING
DESCRIPTORS: ACHING

## 2022-11-01 ASSESSMENT — LIFESTYLE VARIABLES: SMOKING_STATUS: 0

## 2022-11-01 ASSESSMENT — PAIN DESCRIPTION - LOCATION
LOCATION: ABDOMEN
LOCATION: ABDOMEN

## 2022-11-01 NOTE — LETTER
[facility]  [address] [place] [address]  Phone: [phone]  Fax: [phone]           Pedro Akhtar MD      November 1, 2022     Patient: Yessica Voss   MR Number: [id number]   YOB: 1985   Date of Visit: 11/1/2022       Dear Dr. Sanon Loge: Thank you for referring Bebe Rosales to me for evaluation/treatment. Below are the relevant portions of my assessment and plan of care. If you have questions, please do not hesitate to call me. I look forward to following Nolvia Ro along with you. Sincerely,        Pedro Akhtar MD    CC providers:   Tera Encarnacion DO  [address] [place] [address]

## 2022-11-01 NOTE — PROGRESS NOTES
Pt here for pregnancy ultrasound  Pt denies any bleeding/cramping/lof  Pt states good fetal movement  Pt denies any headache/blurred vision/epigastric pain

## 2022-11-01 NOTE — PROGRESS NOTES
Patient sent from [facility] 38 weeks with elevated BP and visible shortness of breath. Baby hard to monitor due to patient unable to tolerate laying down due to shortness of breath. Patient placed on pulse ox and bp obtained.

## 2022-11-01 NOTE — PATIENT INSTRUCTIONS

## 2022-11-01 NOTE — PROGRESS NOTES
[facility]  [address] [place] [address].  [address] [place] [address]. [address]  Ph: [phone] Fax: [phone]  2022     RE: Sandra Pierre   89   Dear / Dr Carlos Carrera: We saw  Ms. Muhammad    for consultation and ultrasound  in the office at  [place], [place]  on          SUMMARY: REASSURING EXAM TODAY. PRECAUTIONS REVIEWED  To L&D for eval- recommend delivery             OB History 35yo  2. Para 1@ 38w0d   Decreased fetal movement    Risk Factors Pregnancy induced Hypertension. Morbid maternal obesity. First trimester exposure to [facility]). S/p myomectomy in ,followed by normal vaginal delivery    Gestational Diabetes        ~Note -    BP  139-162 Sys//  Caren  DSUA 2+ proteinuria, neg glucosuria today  11# weight gain past 10 days      The patient had a nonstress test performed today which was reactive. There was moderate variability with accelerations     The patient had a detailed ultrasound performed today which was reassuring . A detailed report is included in the EMR under the imaging tab from today's date. 1.Vertex female at 36w 0d. Est FW ~ 8-8.5lbs  2. Amniotic fluid appeared normal.   3. BPP score of 93/02.  4. Umbilical artery dopplers were within normal limits. 5. MCA dopplers show MoM of 1.20      Elevated blood pressures today, instructed to go to the labor unit of [facility] in [facility] for further evaluation with preeclampsia work-up and further monitoring.  Delivery recommended     Guillermo Bravo MD  Maternal Fetal Medicine
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
## 2022-11-01 NOTE — CONSULTS
[address] for Medical Management      Reason for Consult:  chest pains and SOB     History of Present Illness:  Patient is a 32 yo female,  with an [address] of 11/15/22. She presented to her [facility] appointment and her BP was high. She was advised to present to  L & D floor at [facility]. Patient's BP was improved on L & D to 140/90. She was noted to have proteinuria. Her LFTs and Platelets are normal.  Her BS was 92. Fibrinogen level was 612. Dorota Raw is anticipated ~ 18:30. Patient reports that she developed Gestational HTN and Diabetes ~ 3 months ago. She has not had issues with either with or without being pregnancy. She delivered 9 years ago a son via  and had no major problems during the pregnancy. She denies HA, visual problems. She reports chills, green phlegm, chronic nausea and vomiting throughout this pregnant. She reports a cough with intermittent phlegm production. Her son has been sick every since school started (2022) having cold like symptoms, fever, hoarseness and vomiting. It takes awhile to get better. When he returns to school the cycle starts all over again. Patient got sick from her son. For the past two weeks she has cycled with chills, cough, shortness of breath and intermittent phlegm production. Lately her symptoms have worsened. She feels as if there is fluid on her lungs. She noticed peripheral ankle edema within the past week. She has difficulty breathing. Sleeping has been okay but other times difficult. [place] and can ambulate without worsening dyspnea. The dyspnea depends on her activity. She has been advised bedrest for a couple of hours a day. She was tested for COVID 19 two weeks ago and was negative. She has received two COVID 19 vaccines (Claudy Pester). She has not gotten the Flu vaccine or TDaP. She reports her son lives with her. There are no pets. She is a [employer].   There is no recent travel. She takes Prenatal gummies, Vit B6, Unisom, Iron and Lantus 26 units at bedtime/night. She is allergic to Lidocaine, Bactrim (-> exfoliative rash), Procaine HCL and Quaternium 15. She denies tobacco use, vaping, alcohol or illicit drug use inclusive of Marijuana. She has had an Appendectomy, Myomectomy, T & A and Dental surgery. FH is positive for HTN, Hyperlipidemia, DM, Pre-eclampsia (patient's Mother) and an infant brother who  from [address]. Informant(s) for H&P: Patient    REVIEW OF SYSTEMS:  A comprehensive review of systems was negative except for: what is in the HPI      PMH:  Past Medical History:   Diagnosis Date    Abnormal Pap smear of cervix     Acute low back pain     Complication of anesthesia     hard to wake up    Concussion wt loss of consciousness of 30 minutes or less     Contusion of multiple sites of left leg     Depression     Gestational hypertension, third trimester 2022    Insulin controlled gestational diabetes mellitus (GDM) during pregnancy 10/21/2022    Post-concussion vertigo 2019       Surgical History:  Past Surgical History:   Procedure Laterality Date    APPENDECTOMY      LAPAROSCOPY      fibroids removed    TONSILLECTOMY      WISDOM TOOTH EXTRACTION         Medications Prior to Admission:    Prior to Admission medications    Medication Sig Start Date End Date Taking? Authorizing Provider   ondansetron (ZOFRAN) 4 MG tablet Take 1 tablet by mouth every 8 hours as needed for Nausea or Vomiting 10/14/22   Brandie Brito MD   Blood Glucose Monitoring Suppl (ONETOUCH VERIO REFLECT) w/Device KIT use as directed 22   Historical Provider, MD   insulin glargine (LANTUS SOLOSTAR) 100 UNIT/ML injection pen Inject 6 Units into the skin nightly Go up daily on lantus by 2 units until next day fasting sugar is 92 mg/dl or less.   Patient taking differently: Inject 26 Units into the skin nightly Go up daily on lantus by 2 units until next day fasting sugar is 92 mg/dl or less. 9/13/22   Renee Rosado MD   Insulin Pen Needle 31G X 5 MM MISC 1 each by Does not apply route daily 9/13/22   Renee Rosado MD   Blood Glucose Monitoring Suppl (FREESTYLE LITE) ESAU 1 Device by Does not apply route daily 8/29/22   Kala Thurston MD   blood glucose test strips (FREESTYLE LITE) strip 1 each by In Vitro route 4 times daily 8/29/22   Kala Thurston MD   FreeStyle Lancets MISC 1 each by Does not apply route 4 times daily 8/29/22   Kala Thurston MD   ferrous sulfate (IRON 325) 325 (65 Fe) MG tablet Take 1 tablet by mouth 2 times daily 8/29/22   Kala Thurston MD   Suppository Base (SPG SUPPOSI-BASE) PLLT  8/8/22   Historical Provider, MD   Progesterone 200 MG SUPP Place 200 mg vaginally nightly 7/27/22 9/13/22  Renee Rosado MD   doxyLAMINE succinate (GNP SLEEP AID) 25 MG tablet Take by mouth nightly    Historical Provider, MD   vitamin B-6 (PYRIDOXINE) 50 MG tablet Take 50 mg by mouth daily    Historical Provider, MD   Prenatal MV & Min w/FA-DHA (PRENATAL GUMMIES) 0.18-25 MG CHEW Take 1 tablet by mouth daily    Historical Provider, MD   acetaminophen (TYLENOL) 325 MG tablet Take 650 mg by mouth every 6 hours as needed for Pain     Historical Provider, MD       Allergies:    Lidocaine, Bactrim [sulfamethoxazole-trimethoprim], Procaine hcl, and Quaternium-15    Social History:    reports that she has never smoked. She has never used smokeless tobacco. She reports that she does not drink alcohol and does not use drugs. Family History:   family history includes Asthma in her mother; Heart Attack in her father; Hypertension in her father and mother. PHYSICAL EXAM:  Vitals:  BP (!) 141/90   Pulse (!) 104   Temp 98.7 °F (37.1 °C) (Oral)   Resp 22   LMP 01/01/2022 (Approximate)   SpO2 98%     General Appearance: alert and oriented to person, place and time and in no acute distress. Able to converse without SOB.   Skin: warm and dry  Head: normocephalic and atraumatic  Eyes: pupils equal, round, and reactive to light, extraocular eye movements intact, conjunctivae normal  Neck: neck supple and non tender without mass   Pulmonary/Chest: clear to auscultation bilaterally- no wheezes, rales or rhonchi, normal air movement, no respiratory distress  Cardiovascular: normal rate, normal S1 and S2 and no carotid bruits  Abdomen: soft, non-tender, gravid uterus, taut with presence of edema, normal bowel sounds, no masses or organomegaly appreciated (difficult 2/2 gravid uterus)  Extremities: no cyanosis, no clubbing and 1+ edema  Neurologic: no cranial nerve deficit and speech normal      LABS:  Recent Labs     11/01/22  1430      K 4.3      CO2 20*   BUN 9   CREATININE 0.8   GLUCOSE 92   CALCIUM 9.4       Recent Labs     11/01/22  1430   WBC 9.9   RBC 3.84   HGB 10.6*   HCT 33.3*   MCV 86.7   MCH 27.6   MCHC 31.8*   RDW 18.2*      MPV 11.7       Hepatic Function Panel:    Lab Results   Component Value Date/Time    ALKPHOS 206 11/01/2022 02:30 PM    ALT 14 11/01/2022 02:30 PM    AST 25 11/01/2022 02:30 PM    PROT 6.3 11/01/2022 02:30 PM    BILITOT 0.3 11/01/2022 02:30 PM    LABALBU 3.1 11/01/2022 02:30 PM     PT/INR:    Lab Results   Component Value Date/Time    PROTIME 10.0 11/01/2022 02:30 PM    INR 0.9 11/01/2022 02:30 PM     Urine protein: 2+; 252 mg/dl   Urine creatinine: 383  Protein/Creatinine ration: 0.7  Fibrinogen Level:  612  Urine Toxicology:  negative  Flu A/B:  negative    Radiology:     XR CHEST PORTABLE    (Results Pending)   Per my review:  no cardiomegaly or infiltrates or edema in either lung    EKG:  no arrhythmia or ischemic changes; rate 103. QTc normal       ASSESSMENT:      Principal Problem:    Gestational hypertension, third trimester  Resolved Problems:    * No resolved hospital problems. *    PLAN:    1. Acute Dyspnea - most likely from bronchospasm from viral etiology. Respiratory panel done. Proventil HFA 2 puffs q 4 hrs until negative COVID 19 result then may use Duonebs. Peak flow x 1.    2.  Gestational HTN - BP improved  from 174/88 to 140/90 at L & D. Has not required meds to lower or treatment with Magnesium sulfate. Trend and treat accordingly. 3.  Gestational DM - BS 80; takes Lantus 26 at night. Post delivery will require less. Would recommend a low dose correction insulin sliding scale. 4.  Morbid Obesity - BMI 47.59. Supportive care. Thank you very much for this interesting consult and we will continue to follow along. Feel free to call with any questions at [phone]. Total care time:  60 minutes    NOTE: This report was transcribed using voice recognition software. Every effort was made to ensure accuracy; however, inadvertent computerized transcription errors may be present.     Electronically signed by Gema Waters MD on 11/1/2022 at 5:23 PM

## 2022-11-01 NOTE — PROCEDURES
Caesar Pacheco is a 31 y.o. female patient. No diagnosis found. Past Medical History:   Diagnosis Date    Abnormal Pap smear of cervix     Acute low back pain     Complication of anesthesia     hard to wake up    Concussion wth loss of consciousness of 30 minutes or less     Contusion of multiple sites of left leg     Depression     Gestational hypertension, third trimester 2022    Insulin controlled gestational diabetes mellitus (GDM) during pregnancy 10/21/2022    Post-concussion vertigo 2019     Blood pressure (!) 141/90, pulse (!) 104, temperature 98.7 °F (37.1 °C), temperature source Oral, resp. rate 20, last menstrual period 2022, SpO2 97 %, not currently breastfeeding. Procedures  PREOPERATIVE DIAGNOSES:     1.38@ intrauterine pregnancy. 2.  previous cs       POSTOPERATIVE DIAGNOSES:     Same. PROCEDURE: primary low transverse  section        SURGEON: Quincy Deng MD       ASSISTANT: Dr Akbar Sneed BLOOD LOSS:  264ZS        COMPLICATIONS:  None. ANESTHESIA:   Spinal anesthesia        FINDINGS:   Live Born  Sex:  Female  Fetal Position:  Cephalic, left occiput anterior  Apgars:  1 minute: 8; 5 minute: 9  Weight:  8 pounds, 14 ounces  Tubes, uterus, ovaries:  normal          DETAILS OF PROCEDURE:    The patient was taken to the operating room where Spinal anesthesia was administered and found to be adequate. Abdomen was prepped   and draped in the normal sterile fashion. Nevarez catheter had previously been   placed. Pfannenstiel skin incision made with a scalpel, carried down to the fascia with cautery. The fascia was nicked in the midline and extended laterally with   cautery. Muscles were  in the midline. Peritoneum was grasped with   hemostats, tented up, and entered sharply. Peritoneal incision was extended   superiorly and inferiorly with good visualization of bladder. Delee bladder blade was introduced.  Bladder flap was created with sharp dissection. Low transverse uterine incision was made with a scalpel and extended bluntly. Membranes ruptured for Clear fluid. A viable female infant was delivered in the cephalic presentation without diffiuclty. Baby was bulb suctioned on the abdomen. Cord was clamped and cut, and handed to waiting R.N. Cord gases and blood were obtained. Placenta was manually extracted with 3 vessel cord. Uterus was exteriorized, cleared of all clot and debris. Uterine incision   was closed with vicryl in a running locked fashion. Good hemostasis was   noted. Uterus, tubes, and ovaries appeared normal. Uterus was returned to   the pelvis. The pelvis was irrigated, cleared of all clot and debris. Fascia was closed with 0 stratophyx  in a running fashion. Subcutaneous tissue was irrigated, made hemostatic. Skin was closed with absorbable subcutaneous staples. Baby and mom to recovery room in stable condition. Placenta to pathology: Yes.     MD Marissa Polk MD  11/1/2022

## 2022-11-01 NOTE — ANESTHESIA PROCEDURE NOTES
Spinal Block    Patient location during procedure: OB  Reason for block: primary anesthetic and at surgeon's request  Staffing  Performed: resident/CRNA   Anesthesiologist: Jamshid Mcdaniel MD  Resident/CRNA: PINKY Hope CRNA  Spinal Block  Patient position: sitting  Prep: Betadine  Patient monitoring: cardiac monitor, continuous pulse ox, continuous capnometry and frequent blood pressure checks  Approach: midline  Location: L3/L4  Provider prep: mask, sterile gloves and sterile gown  Local infiltration: lidocaine  Needle  Needle type: Quincke   Needle gauge: 25 G  Needle length: 3.5 in  Assessment  Sensory level: T4  Swirl obtained: Yes  CSF: clear  Attempts: 1  Hemodynamics: stable  Preanesthetic Checklist  Completed: patient identified, IV checked, site marked, risks and benefits discussed, surgical/procedural consents, equipment checked, pre-op evaluation, timeout performed, anesthesia consent given, oxygen available and monitors applied/VS acknowledged

## 2022-11-02 LAB
HCT VFR BLD CALC: 30.6 % (ref 34–48)
HEMOGLOBIN: 9.6 G/DL (ref 11.5–15.5)
MCH RBC QN AUTO: 27.6 PG (ref 26–35)
MCHC RBC AUTO-ENTMCNC: 31.4 % (ref 32–34.5)
MCV RBC AUTO: 87.9 FL (ref 80–99.9)
METER GLUCOSE: 109 MG/DL (ref 74–99)
METER GLUCOSE: 113 MG/DL (ref 74–99)
METER GLUCOSE: 129 MG/DL (ref 74–99)
PDW BLD-RTO: 18.2 FL (ref 11.5–15)
PLATELET # BLD: 214 E9/L (ref 130–450)
PMV BLD AUTO: 11.4 FL (ref 7–12)
RBC # BLD: 3.48 E12/L (ref 3.5–5.5)
WBC # BLD: 11.4 E9/L (ref 4.5–11.5)

## 2022-11-02 PROCEDURE — 85027 COMPLETE CBC AUTOMATED: CPT

## 2022-11-02 PROCEDURE — 6360000002 HC RX W HCPCS: Performed by: OBSTETRICS & GYNECOLOGY

## 2022-11-02 PROCEDURE — 2580000003 HC RX 258: Performed by: OBSTETRICS & GYNECOLOGY

## 2022-11-02 PROCEDURE — 1220000000 HC SEMI PRIVATE OB R&B

## 2022-11-02 PROCEDURE — 6370000000 HC RX 637 (ALT 250 FOR IP): Performed by: ANESTHESIOLOGY

## 2022-11-02 PROCEDURE — 6370000000 HC RX 637 (ALT 250 FOR IP): Performed by: OBSTETRICS & GYNECOLOGY

## 2022-11-02 PROCEDURE — 94640 AIRWAY INHALATION TREATMENT: CPT

## 2022-11-02 PROCEDURE — 99233 SBSQ HOSP IP/OBS HIGH 50: CPT | Performed by: FAMILY MEDICINE

## 2022-11-02 PROCEDURE — 82962 GLUCOSE BLOOD TEST: CPT

## 2022-11-02 PROCEDURE — 36415 COLL VENOUS BLD VENIPUNCTURE: CPT

## 2022-11-02 PROCEDURE — 6370000000 HC RX 637 (ALT 250 FOR IP): Performed by: FAMILY MEDICINE

## 2022-11-02 PROCEDURE — 2700000000 HC OXYGEN THERAPY PER DAY

## 2022-11-02 RX ORDER — INSULIN LISPRO 100 [IU]/ML
0-4 INJECTION, SOLUTION INTRAVENOUS; SUBCUTANEOUS NIGHTLY
Status: DISCONTINUED | OUTPATIENT
Start: 2022-11-02 | End: 2022-11-04 | Stop reason: HOSPADM

## 2022-11-02 RX ORDER — IPRATROPIUM BROMIDE AND ALBUTEROL SULFATE 2.5; .5 MG/3ML; MG/3ML
1 SOLUTION RESPIRATORY (INHALATION) EVERY 4 HOURS
Status: DISCONTINUED | OUTPATIENT
Start: 2022-11-02 | End: 2022-11-04 | Stop reason: HOSPADM

## 2022-11-02 RX ORDER — INSULIN LISPRO 100 [IU]/ML
0-4 INJECTION, SOLUTION INTRAVENOUS; SUBCUTANEOUS
Status: DISCONTINUED | OUTPATIENT
Start: 2022-11-02 | End: 2022-11-04 | Stop reason: HOSPADM

## 2022-11-02 RX ORDER — DEXTROSE MONOHYDRATE 100 MG/ML
INJECTION, SOLUTION INTRAVENOUS CONTINUOUS PRN
Status: DISCONTINUED | OUTPATIENT
Start: 2022-11-02 | End: 2022-11-04 | Stop reason: HOSPADM

## 2022-11-02 RX ADMIN — ACETAMINOPHEN 1000 MG: 500 TABLET ORAL at 20:47

## 2022-11-02 RX ADMIN — IPRATROPIUM BROMIDE AND ALBUTEROL SULFATE 1 AMPULE: 2.5; .5 SOLUTION RESPIRATORY (INHALATION) at 16:14

## 2022-11-02 RX ADMIN — Medication 5 ML: at 21:00

## 2022-11-02 RX ADMIN — ACETAMINOPHEN 1000 MG: 500 TABLET ORAL at 12:58

## 2022-11-02 RX ADMIN — DOCUSATE SODIUM 100 MG: 100 CAPSULE, LIQUID FILLED ORAL at 08:27

## 2022-11-02 RX ADMIN — OXYCODONE 10 MG: 5 TABLET ORAL at 04:52

## 2022-11-02 RX ADMIN — IPRATROPIUM BROMIDE AND ALBUTEROL SULFATE 1 AMPULE: 2.5; .5 SOLUTION RESPIRATORY (INHALATION) at 21:41

## 2022-11-02 RX ADMIN — IPRATROPIUM BROMIDE AND ALBUTEROL SULFATE 1 AMPULE: 2.5; .5 SOLUTION RESPIRATORY (INHALATION) at 08:05

## 2022-11-02 RX ADMIN — OXYCODONE 10 MG: 5 TABLET ORAL at 20:42

## 2022-11-02 RX ADMIN — OXYCODONE 10 MG: 5 TABLET ORAL at 14:21

## 2022-11-02 RX ADMIN — DOCUSATE SODIUM 100 MG: 100 CAPSULE, LIQUID FILLED ORAL at 20:42

## 2022-11-02 RX ADMIN — MAGNESIUM SULFATE HEPTAHYDRATE 2000 MG/HR: 40 INJECTION, SOLUTION INTRAVENOUS at 07:47

## 2022-11-02 ASSESSMENT — PAIN DESCRIPTION - LOCATION
LOCATION: INCISION
LOCATION: ABDOMEN;INCISION
LOCATION: ABDOMEN

## 2022-11-02 ASSESSMENT — PAIN - FUNCTIONAL ASSESSMENT: PAIN_FUNCTIONAL_ASSESSMENT: ACTIVITIES ARE NOT PREVENTED

## 2022-11-02 ASSESSMENT — PAIN DESCRIPTION - ORIENTATION
ORIENTATION: LOWER
ORIENTATION: LOWER

## 2022-11-02 ASSESSMENT — PAIN SCALES - GENERAL
PAINLEVEL_OUTOF10: 6
PAINLEVEL_OUTOF10: 7
PAINLEVEL_OUTOF10: 8
PAINLEVEL_OUTOF10: 8

## 2022-11-02 ASSESSMENT — PAIN DESCRIPTION - DESCRIPTORS
DESCRIPTORS: SORE
DESCRIPTORS: ACHING

## 2022-11-02 NOTE — FLOWSHEET NOTE
Up to bathroom with one assist without difficulty and voided. Instructed on and devin-care given. Denies feeling dizzy and/or lightheaded while up.

## 2022-11-02 NOTE — PROGRESS NOTES
[facility] Progress Note    Admitting Date and Time: 11/1/2022  1:33 PM  Admit Dx: Gestational hypertension, third trimester [O13.3]  PIH (pregnancy induced hypertension), first trimester [O13.1]    Subjective:  Patient is being followed for Gestational hypertension, third trimester [O13.3]  PIH (pregnancy induced hypertension), first trimester [O13.1]   Pt feels sore throat. Coughing which hurts incision. Planning on Nursing and using formula. Per RN: On Magnesium Sulfate IV. BP controlled now. ROS: denies fever, chills, cp, sob, n/v, HA unless stated above. albuterol sulfate HFA  2 puff Inhalation Q4H    sodium chloride flush  5-40 mL IntraVENous 2 times per day    rho(D) immune globulin  300 mcg IntraMUSCular Once    docusate sodium  100 mg Oral BID    Tdap-Dtap  0.5 mL IntraMUSCular Prior to discharge    sodium chloride flush  5-40 mL IntraVENous 2 times per day    butorphanol         sodium chloride flush, 5-40 mL, PRN  sodium chloride, , PRN  lanolin, , Q1H PRN  acetaminophen, 1,000 mg, Q8H PRN  ibuprofen, 800 mg, Q8H PRN  oxyCODONE, 5 mg, Q4H PRN   Or  oxyCODONE, 10 mg, Q4H PRN  sodium chloride flush, 5-40 mL, PRN  sodium chloride, , PRN  fentanNYL, 25 mcg, Q5 Min PRN  fentanNYL, 50 mcg, Q5 Min PRN  ondansetron, 4 mg, Q6H PRN  oxyCODONE, 5 mg, Q4H PRN   Or  oxyCODONE, 10 mg, Q4H PRN  diphenhydrAMINE, 25 mg, Q6H PRN   Or  diphenhydramine, 25 mg, Q6H PRN  calcium gluconate, 1,000 mg, PRN  hydrALAZINE, 10 mg, Once PRN    Objective:    /77   Pulse 74   Temp 97.3 °F (36.3 °C)   Resp 18   LMP 01/01/2022 (Approximate)   SpO2 97%   Breastfeeding Unknown     General Appearance: alert and oriented to person, place and time and in no acute distress.   No SOB appreciated  Skin: warm and dry  Head: normocephalic and atraumatic  Eyes: pupils equal, round, and reactive to light, extraocular eye movements intact, conjunctivae normal  Neck: neck supple and non tender without mass Pulmonary/Chest: clear to auscultation bilaterally- no wheezes, rales or rhonchi, normal air movement, no respiratory distress  Cardiovascular: normal rate, normal S1 and S2 and no carotid bruits  Abdomen: soft, non-tender, non-distended, normal bowel sounds, no masses or organomegaly  Extremities: no cyanosis, no clubbing and no edema  Neurologic: no cranial nerve deficit and speech normal    Recent Labs     22  1430      K 4.3      CO2 20*   BUN 9   CREATININE 0.8   GLUCOSE 92   CALCIUM 9.4       Recent Labs     22  1430 22  0445   WBC 9.9 11.4   RBC 3.84 3.48*   HGB 10.6* 9.6*   HCT 33.3* 30.6*   MCV 86.7 87.9   MCH 27.6 27.6   MCHC 31.8* 31.4*   RDW 18.2* 18.2*    214   MPV 11.7 11.4     Radiology:   XR CHEST PORTABLE    Result Date: 2022  EXAMINATION: ONE XRAY VIEW OF THE CHEST 2022 5:14 pm COMPARISON: 2018 HISTORY: ORDERING SYSTEM PROVIDED HISTORY: sob. chest pain TECHNOLOGIST PROVIDED HISTORY: Reason for exam:->sob. chest pain FINDINGS: The cardiomediastinal silhouette is without acute process. The lungs are without acute focal process. There is no effusion or pneumothorax. The osseous structures are without acute process. No evidence of acute cardiopulmonary disease is seen. Assessment:    Principal Problem:    Gestational hypertension, third trimester  Active Problems:    Acute dyspnea    Acute bronchospasm due to viral infection    Proteinuria affecting pregnancy in third trimester    PIH (pregnancy induced hypertension), first trimester  Resolved Problems:    * No resolved hospital problems. *    Plan:  1. S/P  - Day # 1. Usual Post op care by OB. 2.  Acute Dyspnea - most likely from bronchospasm from viral etiology. Respiratory panel done; results negative. DC Proventil HFA 2 puffs q 4 hrs 2/2 negative COVID 19  Duonebs ordered. Peak flow x 1.    3.  Gestational HTN - BP improved to 118/74 - 129/73 on monitor.   Trend and treat accordingly. 4.  Gestational DM - on regular diet. Glucose POCT. LDSS insulin. Hypoglycemia protocol. 5.  Morbid Obesity - BMI 47.59. Supportive care. 6.  Laryngitis - viral etiology most likely. Supportive care. Chloraseptic spray for sore throat. Total care time:  25 minutes    NOTE: This report was transcribed using voice recognition software. Every effort was made to ensure accuracy; however, inadvertent computerized transcription errors may be present.     Electronically signed by Filomena Aguilar MD on 11/2/2022 at 7:18 AM

## 2022-11-02 NOTE — FLOWSHEET NOTE
Admitted to 313. Instructed to call nurse before getting out of bed,infant safety and safe sleep protocols. Smi given and verbalizes knowledge of using.

## 2022-11-02 NOTE — FLOWSHEET NOTE
Pt. Was up to bathroom has dyspnea with mild to moderate exertion. Respiratory notified for rx.  Pt.states breathing satisfactory at rest.

## 2022-11-02 NOTE — LACTATION NOTE
Mom wishes to pump to provided breast milk, supplementing with formula . EBP set up and initiated, instructed on cleaning breast pump parts. Normal milk production reviewed and the importance of maintaining a pumping routine to stimulate production. Breastfeeding booklet given and reviewed. Support provided  and encouraged to call with any needs.

## 2022-11-02 NOTE — PROGRESS NOTES
Hearing screening results were discussed with parent. Questions answered. Brochure given to parent. Advised to monitor developmental milestones and contact physician for any concerns.    Electronically signed by Lina Parekh on 11/2/2022 at 10:15 AM

## 2022-11-02 NOTE — PROGRESS NOTES
Bp 183/110, 170/101 called dr Albert Marshall. Pt takes lantis 28 at hs. Bs 80 orders for magso4 labetolol bs fasting ac and hs.

## 2022-11-02 NOTE — PROGRESS NOTES
Progress Note    SUBJECTIVE: patient status post c section delivery day 1 doing well , normal postpartum course. Accepted level of pain    OBJECTIVE:    VITALS:  /71   Pulse 81   Temp 98.4 °F (36.9 °C)   Resp 16   LMP 01/01/2022 (Approximate)   SpO2 94%   Breastfeeding Unknown   Physical Exam  lung:cta  Heart : regular rythm  Abdomen:soft  Appropriate tendernessr ,firm uterus ,bs present,incision clear and dry.   Extremities :normal no evidence of DVT  DATA:  CBC:   Lab Results   Component Value Date/Time    WBC 11.4 11/02/2022 04:45 AM    RBC 3.48 11/02/2022 04:45 AM    HGB 9.6 11/02/2022 04:45 AM    HCT 30.6 11/02/2022 04:45 AM    MCV 87.9 11/02/2022 04:45 AM    MCH 27.6 11/02/2022 04:45 AM    MCHC 31.4 11/02/2022 04:45 AM    RDW 18.2 11/02/2022 04:45 AM     11/02/2022 04:45 AM    MPV 11.4 11/02/2022 04:45 AM       ASSESSMENT AND PLAN:  Patient Active Problem List   Diagnosis    History of myomectomy    Insulin controlled gestational diabetes mellitus (GDM) during pregnancy    Short cervix affecting pregnancy    36 weeks gestation of pregnancy    Gestational hypertension, third trimester    Acute dyspnea    Acute bronchospasm due to viral infection    Proteinuria affecting pregnancy in third trimester    PIH (pregnancy induced hypertension), first trimester       Normal post partum care   Anticipate discharge home in  24 hours

## 2022-11-03 LAB
METER GLUCOSE: 113 MG/DL (ref 74–99)
METER GLUCOSE: 89 MG/DL (ref 74–99)
METER GLUCOSE: 89 MG/DL (ref 74–99)
METER GLUCOSE: 96 MG/DL (ref 74–99)

## 2022-11-03 PROCEDURE — 2580000003 HC RX 258: Performed by: OBSTETRICS & GYNECOLOGY

## 2022-11-03 PROCEDURE — 6370000000 HC RX 637 (ALT 250 FOR IP): Performed by: OBSTETRICS & GYNECOLOGY

## 2022-11-03 PROCEDURE — 82962 GLUCOSE BLOOD TEST: CPT

## 2022-11-03 PROCEDURE — 99232 SBSQ HOSP IP/OBS MODERATE 35: CPT | Performed by: FAMILY MEDICINE

## 2022-11-03 PROCEDURE — 6370000000 HC RX 637 (ALT 250 FOR IP): Performed by: FAMILY MEDICINE

## 2022-11-03 PROCEDURE — 94640 AIRWAY INHALATION TREATMENT: CPT

## 2022-11-03 PROCEDURE — 1220000000 HC SEMI PRIVATE OB R&B

## 2022-11-03 RX ORDER — BISACODYL 10 MG
10 SUPPOSITORY, RECTAL RECTAL DAILY PRN
Status: DISCONTINUED | OUTPATIENT
Start: 2022-11-03 | End: 2022-11-04 | Stop reason: HOSPADM

## 2022-11-03 RX ORDER — SODIUM PHOSPHATE, DIBASIC AND SODIUM PHOSPHATE, MONOBASIC 7; 19 G/133ML; G/133ML
1 ENEMA RECTAL
Status: DISCONTINUED | OUTPATIENT
Start: 2022-11-03 | End: 2022-11-04 | Stop reason: HOSPADM

## 2022-11-03 RX ORDER — SIMETHICONE 80 MG
80 TABLET,CHEWABLE ORAL EVERY 6 HOURS PRN
Status: DISCONTINUED | OUTPATIENT
Start: 2022-11-03 | End: 2022-11-04 | Stop reason: HOSPADM

## 2022-11-03 RX ADMIN — DOCUSATE SODIUM 100 MG: 100 CAPSULE, LIQUID FILLED ORAL at 22:04

## 2022-11-03 RX ADMIN — Medication 10 ML: at 09:36

## 2022-11-03 RX ADMIN — DOCUSATE SODIUM 100 MG: 100 CAPSULE, LIQUID FILLED ORAL at 09:37

## 2022-11-03 RX ADMIN — OXYCODONE 10 MG: 5 TABLET ORAL at 22:04

## 2022-11-03 RX ADMIN — SIMETHICONE 80 MG: 80 TABLET, CHEWABLE ORAL at 14:03

## 2022-11-03 RX ADMIN — OXYCODONE 10 MG: 5 TABLET ORAL at 05:40

## 2022-11-03 RX ADMIN — IPRATROPIUM BROMIDE AND ALBUTEROL SULFATE 1 AMPULE: 2.5; .5 SOLUTION RESPIRATORY (INHALATION) at 13:08

## 2022-11-03 RX ADMIN — ACETAMINOPHEN 1000 MG: 500 TABLET ORAL at 14:03

## 2022-11-03 RX ADMIN — IPRATROPIUM BROMIDE AND ALBUTEROL SULFATE 1 AMPULE: 2.5; .5 SOLUTION RESPIRATORY (INHALATION) at 05:31

## 2022-11-03 RX ADMIN — SIMETHICONE 80 MG: 80 TABLET, CHEWABLE ORAL at 22:04

## 2022-11-03 RX ADMIN — ACETAMINOPHEN 1000 MG: 500 TABLET ORAL at 22:05

## 2022-11-03 RX ADMIN — OXYCODONE 10 MG: 5 TABLET ORAL at 18:20

## 2022-11-03 RX ADMIN — ACETAMINOPHEN 1000 MG: 500 TABLET ORAL at 05:39

## 2022-11-03 RX ADMIN — OXYCODONE 10 MG: 5 TABLET ORAL at 01:56

## 2022-11-03 RX ADMIN — OXYCODONE 10 MG: 5 TABLET ORAL at 14:04

## 2022-11-03 RX ADMIN — Medication 10 ML: at 22:48

## 2022-11-03 RX ADMIN — IPRATROPIUM BROMIDE AND ALBUTEROL SULFATE 1 AMPULE: 2.5; .5 SOLUTION RESPIRATORY (INHALATION) at 01:27

## 2022-11-03 RX ADMIN — IPRATROPIUM BROMIDE AND ALBUTEROL SULFATE 1 AMPULE: 2.5; .5 SOLUTION RESPIRATORY (INHALATION) at 07:47

## 2022-11-03 RX ADMIN — IPRATROPIUM BROMIDE AND ALBUTEROL SULFATE 1 AMPULE: 2.5; .5 SOLUTION RESPIRATORY (INHALATION) at 17:03

## 2022-11-03 RX ADMIN — IPRATROPIUM BROMIDE AND ALBUTEROL SULFATE 1 AMPULE: 2.5; .5 SOLUTION RESPIRATORY (INHALATION) at 20:03

## 2022-11-03 RX ADMIN — PHENOL 1 SPRAY: 1.5 LIQUID ORAL at 14:08

## 2022-11-03 RX ADMIN — OXYCODONE 10 MG: 5 TABLET ORAL at 09:37

## 2022-11-03 ASSESSMENT — PAIN DESCRIPTION - LOCATION
LOCATION: INCISION
LOCATION: INCISION
LOCATION: ABDOMEN;INCISION
LOCATION: ABDOMEN;PERINEUM
LOCATION: ABDOMEN;INCISION
LOCATION: ABDOMEN

## 2022-11-03 ASSESSMENT — PAIN - FUNCTIONAL ASSESSMENT
PAIN_FUNCTIONAL_ASSESSMENT: ACTIVITIES ARE NOT PREVENTED
PAIN_FUNCTIONAL_ASSESSMENT: PREVENTS OR INTERFERES SOME ACTIVE ACTIVITIES AND ADLS

## 2022-11-03 ASSESSMENT — PAIN SCALES - GENERAL
PAINLEVEL_OUTOF10: 1
PAINLEVEL_OUTOF10: 8
PAINLEVEL_OUTOF10: 9
PAINLEVEL_OUTOF10: 8
PAINLEVEL_OUTOF10: 9

## 2022-11-03 ASSESSMENT — PAIN DESCRIPTION - ORIENTATION
ORIENTATION: MID
ORIENTATION: LOWER
ORIENTATION: LOWER

## 2022-11-03 ASSESSMENT — PAIN DESCRIPTION - RADICULAR PAIN: RADICULAR_PAIN: ABSENT

## 2022-11-03 ASSESSMENT — PAIN DESCRIPTION - DESCRIPTORS
DESCRIPTORS: SORE
DESCRIPTORS: ACHING;BURNING
DESCRIPTORS: SORE
DESCRIPTORS: DISCOMFORT;SORE
DESCRIPTORS: DISCOMFORT;SORE

## 2022-11-03 NOTE — FLOWSHEET NOTE
Ambulated in [place] with one assist,dyspnea noted when ambulating in [place]. Respiratory therapy notified to do rx.

## 2022-11-03 NOTE — FLOWSHEET NOTE
Encouraged to use smi. Encouraged to ambulate in halls and pt.states has done and will do again. Dyspnea with moderate exertion.

## 2022-11-03 NOTE — LACTATION NOTE
Mom reports getting bigger drops when she pumps now, encouraged to feed baby any colostrum she pumps. Discussed the importance of maintaining pumping routine to stimulate production. Support provided and encouraged to call with any needs.

## 2022-11-03 NOTE — ANESTHESIA POSTPROCEDURE EVALUATION
Department of Anesthesiology  Postprocedure Note    Patient: Lucian Shaikh  MRN: [id number]  [place]: 1985  Date of evaluation: 11/3/2022      Procedure Summary     Date: 22 Room / Location: [facility]  [facility]    Anesthesia Start:  Anesthesia Stop:     Procedures:        SECTION (Uterus)      Labor Analgesia Diagnosis:        infant of 45 completed weeks of gestation      ( infant of 45 completed weeks of gestation [Z38.2])    Surgeons: Sarah Aldrich MD Responsible Provider: Martha Melo MD    Anesthesia Type: Spinal ASA Status: 3          Anesthesia Type: Spinal    Ne Phase I: Ne Score: 9    Ne Phase II:        Anesthesia Post Evaluation    Patient location during evaluation: bedside  Patient participation: complete - patient participated  Level of consciousness: awake  Pain score: 0  Airway patency: patent  Nausea & Vomiting: no nausea and no vomiting  Complications: no  Cardiovascular status: hemodynamically stable  Respiratory status: acceptable  Hydration status: euvolemic

## 2022-11-03 NOTE — PLAN OF CARE
Problem: Safety - Adult  Goal: Free from fall injury  Outcome: Progressing
pain and evaluate response   Implement non-pharmacological measures as appropriate and evaluate response     Problem: Infection - Adult  Goal: Absence of infection during hospitalization  Outcome: Progressing

## 2022-11-03 NOTE — PROGRESS NOTES
Progress Note    SUBJECTIVE: patient status post c section delivery day 2 doing well , normal postpartum course. Accepted level of pain    OBJECTIVE:    VITALS:  BP (!) 144/67   Pulse (!) 115   Temp 98.8 °F (37.1 °C) (Oral)   Resp 18   LMP 01/01/2022 (Approximate)   SpO2 96%   Breastfeeding Unknown   Physical Exam  lung:cta  Heart : regular rythm  Abdomen:soft  Appropriate tendernessr ,firm uterus ,bs present,incision clear and dry.   Extremities :normal no evidence of DVT  DATA:  CBC:   Lab Results   Component Value Date/Time    WBC 11.4 11/02/2022 04:45 AM    RBC 3.48 11/02/2022 04:45 AM    HGB 9.6 11/02/2022 04:45 AM    HCT 30.6 11/02/2022 04:45 AM    MCV 87.9 11/02/2022 04:45 AM    MCH 27.6 11/02/2022 04:45 AM    MCHC 31.4 11/02/2022 04:45 AM    RDW 18.2 11/02/2022 04:45 AM     11/02/2022 04:45 AM    MPV 11.4 11/02/2022 04:45 AM       ASSESSMENT AND PLAN:  Patient Active Problem List   Diagnosis    History of myomectomy    Insulin controlled gestational diabetes mellitus (GDM) during pregnancy    Short cervix affecting pregnancy    36 weeks gestation of pregnancy    Gestational hypertension, third trimester    Acute dyspnea    Acute bronchospasm due to viral infection    Proteinuria affecting pregnancy in third trimester    PIH (pregnancy induced hypertension), first trimester       Normal post partum care   Anticipate discharge home in  24 hours

## 2022-11-03 NOTE — CARE COORDINATION
SW Discharge Planning   SW received consult for \" PPD score 16\"    SW met privately with Gerda Ibarra and introduced self and role. SW provided education, support and resources on post partum depression. Iraida Currie accepted information. Gerda Ibarra was holding baby and was attentive and affectionate with baby, declining any other needs at this time.     Electronically signed by SON Ocampo on 11/3/2022 at 12:14 PM

## 2022-11-03 NOTE — PROGRESS NOTES
no respiratory distress  Cardiovascular: normal rate, normal S1 and S2 and no carotid bruits  Abdomen: soft, non-tender, non-distended, normal bowel sounds, no masses or organomegaly  Extremities: no cyanosis, no clubbing and no edema  Neurologic: no cranial nerve deficit and speech normal    Recent Labs     22  1430      K 4.3      CO2 20*   BUN 9   CREATININE 0.8   GLUCOSE 92   CALCIUM 9.4       Recent Labs     22  1430 22  0445   WBC 9.9 11.4   RBC 3.84 3.48*   HGB 10.6* 9.6*   HCT 33.3* 30.6*   MCV 86.7 87.9   MCH 27.6 27.6   MCHC 31.8* 31.4*   RDW 18.2* 18.2*    214   MPV 11.7 11.4     Radiology:   N/A    Assessment:    Principal Problem:    Gestational hypertension, third trimester  Active Problems:    Acute dyspnea    Acute bronchospasm due to viral infection    Proteinuria affecting pregnancy in third trimester    PIH (pregnancy induced hypertension), first trimester  Resolved Problems:    * No resolved hospital problems. *    Plan:  1. S/P  - Day # 2. Usual Post op care by OB. 2.  Acute Dyspnea - most likely from bronchospasm from viral etiology. Respiratory panel done; results negative. Continue Proventil HFA 2 puffs q 4 hrs; do prior to activity if treatment due. 3.  Gestational HTN - BP improved to 118/77 - 145/78. Last /82. Trend and treat accordingly. 4.  Gestational DM - on regular diet. Glucose POCT. LDSS insulin. Hypoglycemia protocol. BS < 130  5. Morbid Obesity - BMI 47.59. Supportive care. 6.  Laryngitis - viral etiology most likely. Supportive care. Chloraseptic spray for sore throat. Total care time:  15 minutes    NOTE: This report was transcribed using voice recognition software. Every effort was made to ensure accuracy; however, inadvertent computerized transcription errors may be present.     Electronically signed by Filomena Aguilar MD on 11/3/2022 at 8:14 AM

## 2022-11-04 VITALS
TEMPERATURE: 98.2 F | OXYGEN SATURATION: 95 % | HEART RATE: 107 BPM | DIASTOLIC BLOOD PRESSURE: 84 MMHG | RESPIRATION RATE: 18 BRPM | SYSTOLIC BLOOD PRESSURE: 155 MMHG

## 2022-11-04 LAB — METER GLUCOSE: 80 MG/DL (ref 74–99)

## 2022-11-04 PROCEDURE — 94640 AIRWAY INHALATION TREATMENT: CPT

## 2022-11-04 PROCEDURE — 6370000000 HC RX 637 (ALT 250 FOR IP): Performed by: FAMILY MEDICINE

## 2022-11-04 PROCEDURE — 99232 SBSQ HOSP IP/OBS MODERATE 35: CPT | Performed by: FAMILY MEDICINE

## 2022-11-04 PROCEDURE — 82962 GLUCOSE BLOOD TEST: CPT

## 2022-11-04 PROCEDURE — 6370000000 HC RX 637 (ALT 250 FOR IP): Performed by: OBSTETRICS & GYNECOLOGY

## 2022-11-04 RX ORDER — HYDROCODONE BITARTRATE AND ACETAMINOPHEN 5; 325 MG/1; MG/1
1 TABLET ORAL EVERY 6 HOURS PRN
Qty: 10 TABLET | Refills: 0 | Status: SHIPPED | OUTPATIENT
Start: 2022-11-04 | End: 2022-11-11

## 2022-11-04 RX ADMIN — ACETAMINOPHEN 1000 MG: 500 TABLET ORAL at 06:19

## 2022-11-04 RX ADMIN — OXYCODONE 10 MG: 5 TABLET ORAL at 10:20

## 2022-11-04 RX ADMIN — OXYCODONE 10 MG: 5 TABLET ORAL at 06:18

## 2022-11-04 RX ADMIN — IPRATROPIUM BROMIDE AND ALBUTEROL SULFATE 1 AMPULE: 2.5; .5 SOLUTION RESPIRATORY (INHALATION) at 01:34

## 2022-11-04 RX ADMIN — DOCUSATE SODIUM 100 MG: 100 CAPSULE, LIQUID FILLED ORAL at 07:50

## 2022-11-04 RX ADMIN — OXYCODONE 10 MG: 5 TABLET ORAL at 02:15

## 2022-11-04 RX ADMIN — SIMETHICONE 80 MG: 80 TABLET, CHEWABLE ORAL at 06:19

## 2022-11-04 RX ADMIN — IPRATROPIUM BROMIDE AND ALBUTEROL SULFATE 1 AMPULE: 2.5; .5 SOLUTION RESPIRATORY (INHALATION) at 05:15

## 2022-11-04 ASSESSMENT — PAIN SCALES - GENERAL
PAINLEVEL_OUTOF10: 1
PAINLEVEL_OUTOF10: 7
PAINLEVEL_OUTOF10: 9
PAINLEVEL_OUTOF10: 3
PAINLEVEL_OUTOF10: 1
PAINLEVEL_OUTOF10: 7
PAINLEVEL_OUTOF10: 1
PAINLEVEL_OUTOF10: 8

## 2022-11-04 ASSESSMENT — PAIN DESCRIPTION - DESCRIPTORS
DESCRIPTORS: ACHING;CRAMPING;DISCOMFORT
DESCRIPTORS: ACHING;CRAMPING;DISCOMFORT
DESCRIPTORS: DISCOMFORT;SORE
DESCRIPTORS: DISCOMFORT;SORE

## 2022-11-04 ASSESSMENT — PAIN DESCRIPTION - LOCATION
LOCATION: ABDOMEN

## 2022-11-04 ASSESSMENT — PAIN - FUNCTIONAL ASSESSMENT
PAIN_FUNCTIONAL_ASSESSMENT: ACTIVITIES ARE NOT PREVENTED

## 2022-11-04 ASSESSMENT — PAIN DESCRIPTION - ORIENTATION
ORIENTATION: LOWER

## 2022-11-04 NOTE — PROGRESS NOTES
Patient instructed on discharge instructions with verbalized understanding. Questions answered prn.      Mepilex dressing to be removed at 7 days pt instructed  Dr. Underwood Hamshire asked pt make follow up appointment for 10 days   Pt to make appointment with PCP for management of BP

## 2022-11-04 NOTE — PROGRESS NOTES
CLINICAL PHARMACY NOTE: MEDS TO BEDS    Total # of Prescriptions Filled: 1   The following medications were delivered to the patient:  Norco 5/325 mg     Additional Documentation:

## 2022-11-04 NOTE — PROGRESS NOTES
Patient discharged to home in stable condition via wheelchair carrying infant on her lap in car seat. Patient and infant accompanied by significant other.

## 2022-11-04 NOTE — LACTATION NOTE
Mom is mostly formula feeding, is also pumping Q 2-3 hrs x 15 min and is collecting drops of colostrum. Encouraged to feed baby any expressed colostrum and maintain consistent pumping routine to stimulate supply. Referred to [address] [place] [address]. Provided support and encouraged to call with any needs.

## 2022-11-04 NOTE — PROGRESS NOTES
[facility] Progress Note    Admitting Date and Time: 11/1/2022  1:33 PM  Admit Dx: Gestational hypertension, third trimester [O13.3]  PIH (pregnancy induced hypertension), first trimester [O13.1]    Subjective:  Patient is being followed for Gestational hypertension, third trimester [O13.3]  PIH (pregnancy induced hypertension), first trimester [O13.1]   Pt feels good. For discharge to home today. BP elevated at 155/71. OB to address. Nursing with colostrum. Per RN: No new concerns    ROS: denies fever, chills, cp, sob, n/v, HA unless stated above.       ipratropium-albuterol  1 ampule Inhalation Q4H    insulin lispro  0-4 Units SubCUTAneous TID WC    insulin lispro  0-4 Units SubCUTAneous Nightly    sodium chloride flush  5-40 mL IntraVENous 2 times per day    rho(D) immune globulin  300 mcg IntraMUSCular Once    docusate sodium  100 mg Oral BID    Tdap-Dtap  0.5 mL IntraMUSCular Prior to discharge     simethicone, 80 mg, Q6H PRN  bisacodyl, 10 mg, Daily PRN  fleet, 1 enema, Once PRN  phenol, 1 spray, Q2H PRN  glucose, 4 tablet, PRN  dextrose bolus, 125 mL, PRN   Or  dextrose bolus, 250 mL, PRN  glucagon (rDNA), 1 mg, PRN  dextrose, , Continuous PRN  sodium chloride flush, 5-40 mL, PRN  sodium chloride, , PRN  lanolin, , Q1H PRN  acetaminophen, 1,000 mg, Q8H PRN  ibuprofen, 800 mg, Q8H PRN  oxyCODONE, 5 mg, Q4H PRN   Or  oxyCODONE, 10 mg, Q4H PRN  calcium gluconate, 1,000 mg, PRN    Objective:    BP (!) 155/71   Pulse (!) 107   Temp 98.2 °F (36.8 °C) (Oral)   Resp 18   LMP 01/01/2022 (Approximate)   SpO2 95%   Breastfeeding Unknown     General Appearance: alert and oriented to person, place and time and in no acute distress  Skin: warm and dry  Head: normocephalic and atraumatic  Eyes: pupils equal, round, and reactive to light, extraocular eye movements intact, conjunctivae normal  Neck: neck supple and non tender without mass   Pulmonary/Chest: clear to auscultation bilaterally- no wheezes, rales or rhonchi, normal air movement, no respiratory distress  Cardiovascular: normal rate, normal S1 and S2 and no carotid bruits  Abdomen: soft, non-tender, non-distended, normal bowel sounds, no masses or organomegaly  Extremities: no cyanosis, no clubbing and no edema  Neurologic: no cranial nerve deficit and speech normal    Recent Labs     22  1430      K 4.3      CO2 20*   BUN 9   CREATININE 0.8   GLUCOSE 92   CALCIUM 9.4       Recent Labs     22  1430 22  0445   WBC 9.9 11.4   RBC 3.84 3.48*   HGB 10.6* 9.6*   HCT 33.3* 30.6*   MCV 86.7 87.9   MCH 27.6 27.6   MCHC 31.8* 31.4*   RDW 18.2* 18.2*    214   MPV 11.7 11.4     Radiology:   N/A    Assessment:    Principal Problem:    Gestational hypertension, third trimester  Active Problems:    Acute dyspnea    Acute bronchospasm due to viral infection    Proteinuria affecting pregnancy in third trimester    PIH (pregnancy induced hypertension), first trimester  Resolved Problems:    * No resolved hospital problems. *    Plan:  1. S/P  - Day # 3. For discharge home today. Medically stable for discharge. 2.  Acute Dyspnea - Continue Proventil HFA 2 puffs q 4 hrs; do prior to activity if treatment due. Did okay with ambulation yesterday. 3.  Gestational HTN - BP improved to 139/82 - 155/71. OB to provide BP med and follow up  4. Gestational DM - on regular diet. Glucose POCT. LDSS insulin. Hypoglycemia protocol. BS < 130. Continue same  5. Morbid Obesity - BMI 47.59. Supportive care. 6.  Laryngitis, improving - viral etiology most likely. Supportive care. Total care time:  15 minutes    ADDENDUM:  Notified by Nursing that BP med referred to me from OB. Advised low Salt diet (2 gm). No caffeine. F/U with PCP in 1 - 2 days. To initiate BP med by PCP with appropriate follow-ups as discharging home today. NOTE: This report was transcribed using voice recognition software.  Every effort was made to ensure accuracy; however, inadvertent computerized transcription errors may be present.     Electronically signed by Manuel Baron MD on 11/4/2022 at 8:13 AM

## 2022-11-04 NOTE — DISCHARGE INSTRUCTIONS

## 2022-11-04 NOTE — PROGRESS NOTES
Dr. Leonard Arellano office called and notified of 2 cycled elevated BPs this am   Physician will call back     1005 Dr. Leonard Arellano returned call and would like Dr. Leela Soriano to manage BP     1010 Dr. Leela Soriano called and pt to schedule appointment with PCP to manage BP   Pt to limit salt and caffeine, drink 8-10 cups of water per day

## 2023-01-08 NOTE — DISCHARGE SUMMARY
Obstetric Discharge Summary    Admitting Diagnosis  IUP term weeks  OB History as of 2022          2    Para   2    Term   2            AB        Living   2         SAB        IAB        Ectopic        Molar        Multiple   0    Live Births   2                Reasons for Admission on 2022  1:33 PM  Gestational hypertension, third trimester [O13.3]  PIH (pregnancy induced hypertension), first trimester [O13.1]  No comment available  Onset of Labor   Section (Repeat)    Prenatal Procedures  None    Intrapartum Procedures                  Delivery: : Low Cervical, Transverse       Postpartum Procedures  None    Postpartum/Operative Complications        Data  Information for the patient's :  Debora Kumar [[id number]]   female   Birth Weight: 8 lb 13.8 oz (4.02 kg)   Discharge With Mother  Complications: No    Discharge Diagnosis       Discharge Information  Discharge Medication List as of 2022  9:28 AM        START taking these medications    Details   HYDROcodone-acetaminophen (NORCO) 5-325 MG per tablet Take 1 tablet by mouth every 6 hours as needed for Pain for up to 7 days. Intended supply: 3 days.  Take lowest dose possible to manage pain, Disp-10 tablet, R-0Normal           CONTINUE these medications which have NOT CHANGED    Details   Blood Glucose Monitoring Suppl (ONETOUCH VERIO REFLECT) w/Device KIT use as directedHistorical Med      Insulin Pen Needle 31G X 5 MM MISC DAILY Starting 2022, Disp-100 each, R-3, Normal      Blood Glucose Monitoring Suppl (FREESTYLE LITE) ESAU DAILY Starting 2022, Disp-1 each, R-0, Normal      FreeStyle Lancets MISC 4 TIMES DAILY Starting 2022, Disp-100 each, R-5, Normal      Prenatal MV & Min w/FA-DHA (PRENATAL GUMMIES) 0.18-25 MG CHEW Take 1 tablet by mouth dailyHistorical Med           STOP taking these medications       ondansetron (ZOFRAN) 4 MG tablet Comments:   Reason for Stopping:         insulin glargine (LANTUS SOLOSTAR) 100 UNIT/ML injection pen Comments:   Reason for Stopping:         blood glucose test strips (FREESTYLE LITE) strip Comments:   Reason for Stopping:         ferrous sulfate (IRON 325) 325 (65 Fe) MG tablet Comments:   Reason for Stopping:         Suppository Base (SPG SUPPOSI-BASE) PLLT Comments:   Reason for Stopping:         Progesterone 200 MG SUPP Comments:   Reason for Stopping:         doxyLAMINE succinate (GNP SLEEP AID) 25 MG tablet Comments:   Reason for Stopping:         vitamin B-6 (PYRIDOXINE) 50 MG tablet Comments:   Reason for Stopping:         acetaminophen (TYLENOL) 325 MG tablet Comments:   Reason for Stopping:               No discharge procedures on file.     Discharge to: Home  Follow up in 2 weeks       Comments

## 2024-06-17 ENCOUNTER — OFFICE VISIT (OUTPATIENT)
Dept: SURGERY | Age: 39
End: 2024-06-17
Payer: COMMERCIAL

## 2024-06-17 VITALS — TEMPERATURE: 97.9 F | BODY MASS INDEX: 40.98 KG/M2 | HEIGHT: 65 IN | WEIGHT: 246 LBS

## 2024-06-17 DIAGNOSIS — E65 ABDOMINAL PANNICULUS: Primary | ICD-10-CM

## 2024-06-17 PROCEDURE — G8427 DOCREV CUR MEDS BY ELIG CLIN: HCPCS | Performed by: PHYSICIAN ASSISTANT

## 2024-06-17 PROCEDURE — 1036F TOBACCO NON-USER: CPT | Performed by: PHYSICIAN ASSISTANT

## 2024-06-17 PROCEDURE — 99212 OFFICE O/P EST SF 10 MIN: CPT | Performed by: PHYSICIAN ASSISTANT

## 2024-06-17 PROCEDURE — G8417 CALC BMI ABV UP PARAM F/U: HCPCS | Performed by: PHYSICIAN ASSISTANT

## 2024-06-17 RX ORDER — NYSTATIN 100000 [USP'U]/G
POWDER TOPICAL
Qty: 60 G | Refills: 5 | Status: SHIPPED | OUTPATIENT
Start: 2024-06-17
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
## 2024-06-21 NOTE — H&P
Department of Obstetrics and Gynecology  Labor and Delivery  Triage Note      SUBJECTIVE:  Chani Valderrama is a 31 y.o. female, Janet Dietrich, Patient's last menstrual period was 2022 (approximate). , Estimated Date of Delivery: 11/15/22, 38w0d, sent here from [facility] for delivery. Pt has history of gestational HTN and Tonny Jose currently on insulin. Pt denies HA or blurred vision. She does admit to SOB and chest pain that has been ongoing for the last 2 weeks. She also reports sore throat, cough, and congestion that began yesterday. She denies fever. +Wheezing. Good FM. No LOF, VB, ctx.     Prenatal course: obesity, history of myomectomy, GDMA2 on insulin, gestational HTN, LGA     PAST OB HISTORY  OB History          2    Para   1    Term   1            AB        Living   1         SAB        IAB        Ectopic        Molar        Multiple        Live Births   1                Past Medical History:        Diagnosis Date    Abnormal Pap smear of cervix     Acute low back pain     Complication of anesthesia     hard to wake up    Concussion wth loss of consciousness of 30 minutes or less     Contusion of multiple sites of left leg     Depression     Insulin controlled gestational diabetes mellitus (GDM) during pregnancy 10/21/2022    Post-concussion vertigo 2019     Past Surgical History:        Procedure Laterality Date    APPENDECTOMY      LAPAROSCOPY      fibroids removed    TONSILLECTOMY      WISDOM TOOTH EXTRACTION       Allergies:  Lidocaine, Bactrim [sulfamethoxazole-trimethoprim], Procaine hcl, and Quaternium-15  Social History:    Social History     Socioeconomic History    Marital status: Single     Spouse name: Not on file    Number of children: Not on file    Years of education: Not on file    Highest education level: Not on file   Occupational History    Not on file   Tobacco Use    Smoking status: Never    Smokeless tobacco: Never   Vaping Use    Vaping Use: Never used   Substance and Physical Therapy Progress Note       Visit Type: Progress Note  Visit: 19  Referring Provider: Remy Bernstein MD  Medical Diagnosis (from order): Z98.890 - History of reconstruction of anterior cruciate ligament tear     Diagnosis Precautions: 11/21/23: RIGHT KNEE ARTHROSCOPY, ANTERIOR CRUCIATE LIGAMENT RECONSTRUCTION, PARTIAL/LATERAL MENISECTOMY    FWB, ROM/Strength/Flexibility      SUBJECTIVE                                                                                                               6/21: Patient have much less sensitivity to knee extension during gym exercises. Has progressed to more frequent exercise for knee extension, but with decreased volume each session and this has seemed helpful. Has been progressing his tricking slowly as he still feels a little weaker on surgical limb.       OBJECTIVE                                                                                                                    Observation       Knee extension maximal voluntary isometric contraction - altered position to allow for better leverage by leaning backwards - which explains large increase in nonsurgical limb force output.     31 cm   Left   Trial 1: 169.7    Trial 2: 180.5   Trial 3: 189.3 lb     Trial 1: 107.3 lbs   Trial 2: 109.7 lbs  Trial 3: 108.7 lbs     Strength/body weight ratio:  Surgical 2.3 (benchmark goal is 3.0)  Non-surgical 4.1    Y balance   Left: 49. 51. 50 cm  Right: 44, 48, 51 cm                          Treatment     Therapeutic Exercise    Performed to develop strength, increase endurance, improve range of motion, improve flexibility, and reduce pain for increased independence and improved functional mobility with transfers, gait, stairs, standing tolerance, return to prior level of function, and tasks of daily living    Suggested continuing to avoiding participation in team sports such as volleyball, basketball, soccer until at least 9 months.      5 minute spin bike warm up   Dynamic  Sexual Activity    Alcohol use: No    Drug use: No    Sexual activity: Yes     Partners: Male   Other Topics Concern    Not on file   Social History Narrative    ** Merged History Encounter **          Social Determinants of Health     Financial Resource Strain: Not on file   Food Insecurity: Not on file   Transportation Needs: Not on file   Physical Activity: Not on file   Stress: Not on file   Social Connections: Not on file   Intimate Partner Violence: Not on file   Housing Stability: Not on file     Family History:       Problem Relation Age of Onset    Asthma Mother     Hypertension Mother     Heart Attack Father     Hypertension Father      Medications Prior to Admission:  Medications Prior to Admission: ondansetron (ZOFRAN) 4 MG tablet, Take 1 tablet by mouth every 8 hours as needed for Nausea or Vomiting  Blood Glucose Monitoring Suppl (ONETOUCH VERIO REFLECT) w/Device KIT, use as directed  insulin glargine (LANTUS SOLOSTAR) 100 UNIT/ML injection pen, Inject 6 Units into the skin nightly Go up daily on lantus by 2 units until next day fasting sugar is 92 mg/dl or less.  (Patient taking differently: Inject 26 Units into the skin nightly Go up daily on lantus by 2 units until next day fasting sugar is 92 mg/dl or less.)  Insulin Pen Needle 31G X 5 MM MISC, 1 each by Does not apply route daily  Blood Glucose Monitoring Suppl (FREESTYLE LITE) ESAU, 1 Device by Does not apply route daily  blood glucose test strips (FREESTYLE LITE) strip, 1 each by In Vitro route 4 times daily  FreeStyle Lancets MISC, 1 each by Does not apply route 4 times daily  ferrous sulfate (IRON 325) 325 (65 Fe) MG tablet, Take 1 tablet by mouth 2 times daily  Suppository Base (SPG SUPPOSI-BASE) PLLT,   Progesterone 200 MG SUPP, Place 200 mg vaginally nightly  doxyLAMINE succinate (GNP SLEEP AID) 25 MG tablet, Take by mouth nightly  vitamin B-6 (PYRIDOXINE) 50 MG tablet, Take 50 mg by mouth daily  Prenatal MV & Min w/FA-DHA (PRENATAL GUMMIES) warm up - heel to butt stretch, knee to chest stretch, single leg deadlift, lunges    Strength assessment    Heel tap to reverse lunge off 6\" step (8\" step limited on surgical limb) 10 kg 2x10     Discussed carefully monitoring of fatigue,soreness or strength plateauing given his more frequent (near daily) strength training, and to give off days should he notice any significant soreness, fatigue, or reducing force output.     Skilled input: tactile instruction/cues and verbal instruction/cues    Writer verbally educated and received verbal consent for hand placement, positioning of patient, and techniques to be performed today from patient for clothing adjustments for techniques, hand placement and palpation for techniques and therapist position for techniques as described above and how they are pertinent to the patient's plan of care.  Home Exercise Program  Access Code: [email or web address]  URL: [email or web address]  Date: 03/25/2024  Prepared by: Danielito Acevedo    Program Notes  2x per week - \"agility\" warm up speed ladder drills - forward, lateral, icky shuffle single leg hops (3-5)x10 - forward, with various foot position. strength movements: reverse nordics - holding a plate    Exercises  - Single Leg Squat with Chair Touch  - 1 x daily - 3 x weekly - 3 sets - 10 reps      ASSESSMENT                                                                                                          To date the patient has made gains as expected.  Patient continues to have impairments and functional deficits as noted.  Patient will continue to benefit from skilled care as outlined.    No pain during strength assessment this session, which allows for more accurate assessment of knee extension strength. Though his surgical limb is only around 56% isometric quad strength or nonsurgical, this represents a substantial improvement from last session. In addition his strength to body weight ratio ( 2.3 N/kg) is  0.18-25 MG CHEW, Take 1 tablet by mouth daily  acetaminophen (TYLENOL) 325 MG tablet, Take 650 mg by mouth every 6 hours as needed for Pain     Review of Systems:   CONSTITUTIONAL:  negative  RESPIRATORY:  negative  CARDIOVASCULAR:  negative  GASTROINTESTINAL:  negative  ALLERGIC/IMMUNOLOGIC:  negative  NEUROLOGICAL:  negative  BEHAVIOR/PSYCH:  negative    OBJECTIVE    Vitals:  BP (!) 153/88   Pulse (!) 104   Resp 18   LMP 2022 (Approximate)   SpO2 99%       General appearance:  awake, alert, cooperative, no apparent distress, and appears stated age  Neurologic:  Awake, alert, oriented to name, place and time.     Lungs:  +Increased work of breathing, +B/l wheeze  Abdomen: Soft, non tender, gravid, consistent with her gestational age  Cervix closed/50/-3    Fetal heart rate:         Baseline Heart Rate:  140 bpm        Accelerations:  present       Decelerations:  absent       Variability:  moderate    Contraction frequency: Irregular    ASSESSMENT:    [address] yo  at 38 weeks  Gestational HTN  Shortness of breath-suspect from URI  GDMA2  Obesity  Previous myomectomy  LGA      Plan: d/w Dr. Vipul Cooper  Preeclampsia labs  Consult to hospitalist for SOB  Plan for c/s this evening improving to standard goal of 3.0 N/kg. He will benefit from additional session to monitor progression of strength, and given advice to continue to carefully progression jumping and landing sport specific movements as he feels comfortable, per advice of surgeon.      PLAN                                                                                                                          Extend frequency and duration per below.  Frequency / Duration for 6 weeks for an estimated total of 1 visits    Suggestions for next session as indicated: Progress per plan of care    Goals  Long Term Goals: to be met by end of plan of care  1. Right knee active range of motion equal to that of left knee to facilitate ease of squatting to the ground to get up/down from the floor. Status: progressing/ongoing  2. Right knee extension strength > 90% to reduce chance of injury and to improve chance of optimal outcome.  Status: revisedMore specific strength goal  3. Patient will demonstrate ability to negotiate level and unlevel surfaces at variable velocities, including change of direction without increased pain or instability to return to age appropriate and community activities at prior level of function Status: progressing/ongoing  4. Lower Extremity Functional Scale: Patient will complete form to reflect an improved raw score to greater than or equal to 55/80 to indicate patient reported improvement in function/disability/impairment (minimal detectable change: 9 points).  Status: progressing/ongoing  5. Patient will be independent with progressed and modified home exercise program. Status: progressing/ongoing      Therapy procedure time and total treatment time can be found documented on the Time Entry flowsheet

## 2024-10-30 ENCOUNTER — APPOINTMENT (OUTPATIENT)
Dept: GENERAL RADIOLOGY | Age: 39
End: 2024-10-30
Payer: COMMERCIAL

## 2024-10-30 ENCOUNTER — HOSPITAL ENCOUNTER (EMERGENCY)
Age: 39
Discharge: HOME | End: 2024-10-30
Payer: COMMERCIAL

## 2024-10-30 VITALS
BODY MASS INDEX: 39.99 KG/M2 | HEART RATE: 99 BPM | HEIGHT: 65 IN | WEIGHT: 240 LBS | OXYGEN SATURATION: 99 % | RESPIRATION RATE: 18 BRPM | DIASTOLIC BLOOD PRESSURE: 93 MMHG | SYSTOLIC BLOOD PRESSURE: 136 MMHG | TEMPERATURE: 97.6 F

## 2024-10-30 DIAGNOSIS — I10 HYPERTENSION, UNSPECIFIED TYPE: ICD-10-CM

## 2024-10-30 DIAGNOSIS — I51.7 CARDIOMEGALY: Primary | ICD-10-CM

## 2024-10-30 LAB
ALBUMIN SERPL-MCNC: 4.2 G/DL (ref 3.5–5.2)
ALP SERPL-CCNC: 131 U/L (ref 35–104)
ALT SERPL-CCNC: 26 U/L (ref 0–32)
ANION GAP SERPL CALCULATED.3IONS-SCNC: 8 MMOL/L (ref 7–16)
AST SERPL-CCNC: 24 U/L (ref 0–31)
BASOPHILS # BLD: 0.02 K/UL (ref 0–0.2)
BASOPHILS NFR BLD: 0 % (ref 0–2)
BILIRUB SERPL-MCNC: 0.7 MG/DL (ref 0–1.2)
BUN SERPL-MCNC: 10 MG/DL (ref 6–20)
CALCIUM SERPL-MCNC: 9.2 MG/DL (ref 8.6–10.2)
CHLORIDE SERPL-SCNC: 102 MMOL/L (ref 98–107)
CO2 SERPL-SCNC: 27 MMOL/L (ref 22–29)
CREAT SERPL-MCNC: 0.8 MG/DL (ref 0.5–1)
EOSINOPHIL # BLD: 0.22 K/UL (ref 0.05–0.5)
EOSINOPHILS RELATIVE PERCENT: 2 % (ref 0–6)
ERYTHROCYTE [DISTWIDTH] IN BLOOD BY AUTOMATED COUNT: 14.5 % (ref 11.5–15)
GFR, ESTIMATED: >90 ML/MIN/1.73M2
GLUCOSE SERPL-MCNC: 130 MG/DL (ref 74–99)
HCT VFR BLD AUTO: 41.2 % (ref 34–48)
HGB BLD-MCNC: 13.1 G/DL (ref 11.5–15.5)
IMM GRANULOCYTES # BLD AUTO: 0.03 K/UL (ref 0–0.58)
IMM GRANULOCYTES NFR BLD: 0 % (ref 0–5)
LYMPHOCYTES NFR BLD: 2.2 K/UL (ref 1.5–4)
LYMPHOCYTES RELATIVE PERCENT: 24 % (ref 20–42)
MCH RBC QN AUTO: 28 PG (ref 26–35)
MCHC RBC AUTO-ENTMCNC: 31.8 G/DL (ref 32–34.5)
MCV RBC AUTO: 88 FL (ref 80–99.9)
MONOCYTES NFR BLD: 0.48 K/UL (ref 0.1–0.95)
MONOCYTES NFR BLD: 5 % (ref 2–12)
NEUTROPHILS NFR BLD: 68 % (ref 43–80)
NEUTS SEG NFR BLD: 6.31 K/UL (ref 1.8–7.3)
PLATELET # BLD AUTO: 372 K/UL (ref 130–450)
PMV BLD AUTO: 10.3 FL (ref 7–12)
POTASSIUM SERPL-SCNC: 3.8 MMOL/L (ref 3.5–5)
PROT SERPL-MCNC: 7.8 G/DL (ref 6.4–8.3)
RBC # BLD AUTO: 4.68 M/UL (ref 3.5–5.5)
SODIUM SERPL-SCNC: 137 MMOL/L (ref 132–146)
WBC OTHER # BLD: 9.3 K/UL (ref 4.5–11.5)

## 2024-10-30 PROCEDURE — 6360000002 HC RX W HCPCS: Performed by: PHYSICIAN ASSISTANT

## 2024-10-30 PROCEDURE — 96374 THER/PROPH/DIAG INJ IV PUSH: CPT

## 2024-10-30 PROCEDURE — 96375 TX/PRO/DX INJ NEW DRUG ADDON: CPT

## 2024-10-30 PROCEDURE — 2580000003 HC RX 258: Performed by: PHYSICIAN ASSISTANT

## 2024-10-30 PROCEDURE — 80053 COMPREHEN METABOLIC PANEL: CPT

## 2024-10-30 PROCEDURE — 99284 EMERGENCY DEPT VISIT MOD MDM: CPT

## 2024-10-30 PROCEDURE — 85025 COMPLETE CBC W/AUTO DIFF WBC: CPT

## 2024-10-30 PROCEDURE — 71046 X-RAY EXAM CHEST 2 VIEWS: CPT

## 2024-10-30 RX ORDER — DIPHENHYDRAMINE HYDROCHLORIDE 50 MG/ML
25 INJECTION INTRAMUSCULAR; INTRAVENOUS ONCE
Status: COMPLETED | OUTPATIENT
Start: 2024-10-30 | End: 2024-10-30

## 2024-10-30 RX ORDER — AMLODIPINE BESYLATE 5 MG/1
5 TABLET ORAL DAILY
Qty: 30 TABLET | Refills: 0 | Status: SHIPPED | OUTPATIENT
Start: 2024-10-30

## 2024-10-30 RX ADMIN — DIPHENHYDRAMINE HYDROCHLORIDE 25 MG: 50 INJECTION INTRAMUSCULAR; INTRAVENOUS at 08:26

## 2024-10-30 RX ADMIN — METHYLPREDNISOLONE SODIUM SUCCINATE 125 MG: 125 INJECTION INTRAMUSCULAR; INTRAVENOUS at 08:26

## 2024-10-30 ASSESSMENT — LIFESTYLE VARIABLES
HOW OFTEN DO YOU HAVE A DRINK CONTAINING ALCOHOL: NEVER
HOW MANY STANDARD DRINKS CONTAINING ALCOHOL DO YOU HAVE ON A TYPICAL DAY: PATIENT DOES NOT DRINK

## 2024-10-30 NOTE — ED PROVIDER NOTES
Independent [facility] Visit.        HPI:  10/30/24, Time: 8:19 AM EDT         Candice Muhammad is a 38 y.o. female presenting to the ED for elevated blood pressure, beginning several months ago.  The complaint has been persistent, moderate in severity, and worsened by nothing.  Patient provides history in the emergency department today.  States that she does have a history of hypertension since she was pregnant with her daughter.  Has been prescribed 5 mg of p.o. amlodipine however missed her doctor's appointment for refill and has not had this for the last several months.  States that a couple days ago she noticed her blood pressure was in the 170s systolic.  Does typically have corresponding headaches when her blood pressure is high.  Currently reports very mild headache.  Did not take anything prior to arrival to help alleviate the symptoms.  Denies any chest pain, shortness of breath, nausea or vomiting.  Denies any head injury prior to the onset of the symptoms.  Patient also reports that she has been having some dental pain over the last several days.  No difficulty breathing or swallowing.  Did see the dentist for this and is on amoxicillin.  Afebrile without recent travel or sick contacts.  Patient denies all other symptoms at this time.    Review of Systems:   A complete review of systems was performed and pertinent positives and negatives are stated within HPI, all other systems reviewed and are negative.          --------------------------------------------- PAST HISTORY ---------------------------------------------  Past Medical History:  has a past medical history of Abnormal Pap smear of cervix, Acute low back pain, Complication of anesthesia, Concussion wth loss of consciousness of 30 minutes or less, Contusion of multiple sites of left leg, Depression, Gestational hypertension, third trimester, Insulin controlled gestational diabetes mellitus (GDM) during pregnancy, Insulin controlled gestational diabetes  mellitus (GDM) during pregnancy, and Post-concussion vertigo.    Past Surgical History:  has a past surgical history that includes Appendectomy; laparoscopy; Tonsillectomy; [place] tooth extraction; and  section (N/A, 2022).    Social History:  reports that she has never smoked. She has never used smokeless tobacco. She reports that she does not drink alcohol and does not use drugs.    Family History: family history includes Asthma in her mother; Heart Attack in her father; Hypertension in her father and mother.     The patient’s home medications have been reviewed.    Allergies: Lidocaine, Procaine hcl, Bactrim [sulfamethoxazole-trimethoprim], and Quaternium-15    -------------------------------------------------- RESULTS -------------------------------------------------  All laboratory and radiology results have been personally reviewed by myself   LABS:  Results for orders placed or performed during the hospital encounter of 10/30/24   CBC with Auto Differential   Result Value Ref Range    WBC 9.3 4.5 - 11.5 k/uL    RBC 4.68 3.50 - 5.50 m/uL    Hemoglobin 13.1 11.5 - 15.5 g/dL    Hematocrit 41.2 34.0 - 48.0 %    MCV 88.0 80.0 - 99.9 fL    MCH 28.0 26.0 - 35.0 pg    MCHC 31.8 (L) 32.0 - 34.5 g/dL    RDW 14.5 11.5 - 15.0 %    Platelets 372 130 - 450 k/uL    MPV 10.3 7.0 - 12.0 fL    Neutrophils % 68 43.0 - 80.0 %    Lymphocytes % 24 20.0 - 42.0 %    Monocytes % 5 2.0 - 12.0 %    Eosinophils % 2 0 - 6 %    Basophils % 0 0.0 - 2.0 %    Immature Granulocytes % 0 0.0 - 5.0 %    Neutrophils Absolute 6.31 1.80 - 7.30 k/uL    Lymphocytes Absolute 2.20 1.50 - 4.00 k/uL    Monocytes Absolute 0.48 0.10 - 0.95 k/uL    Eosinophils Absolute 0.22 0.05 - 0.50 k/uL    Basophils Absolute 0.02 0.00 - 0.20 k/uL    Immature Granulocytes Absolute 0.03 0.00 - 0.58 k/uL   CMP   Result Value Ref Range    Sodium 137 132 - 146 mmol/L    Potassium 3.8 3.5 - 5.0 mmol/L    Chloride 102 98 - 107 mmol/L    CO2 27 22 - 29 mmol/L

## (undated) DEVICE — ELECTRODE PT RET AD L9FT HI MOIST COND ADH HYDRGEL CORDED

## (undated) DEVICE — COUNTER NDL 30 COUNT DBL MAG

## (undated) DEVICE — HYPODERMIC SAFETY NEEDLE: Brand: MAGELLAN

## (undated) DEVICE — SUTURE CHROMIC GUT SZ 1 L36IN ABSRB BRN L48MM CTX 1/2 CIR 905H

## (undated) DEVICE — COVER,LIGHT HANDLE,FLX,2/PK: Brand: MEDLINE INDUSTRIES, INC.

## (undated) DEVICE — PEN: MARKING STD 100/CS: Brand: MEDICAL ACTION INDUSTRIES

## (undated) DEVICE — BLADE SURG NO20 S STL STR DISP GLASSVAN

## (undated) DEVICE — GOWN,SIRUS,FABRNF,L,20/CS: Brand: MEDLINE

## (undated) DEVICE — PENCIL ES L3M BTTN SWCH HOLSTER W/ BLDE ELECTRD EDGE

## (undated) DEVICE — STRIP,CLOSURE,WOUND,MEDI-STRIP,1/2X4: Brand: MEDLINE

## (undated) DEVICE — MEDI-VAC YANKAUER SUCTION HANDLE W/BULBOUS TIP: Brand: CARDINAL HEALTH

## (undated) DEVICE — GOWN,SIRUS,POLYRNF,BRTHSLV,XLN/XL,20/CS: Brand: MEDLINE

## (undated) DEVICE — STAPLER SKIN SQ 30 ABSRB STPL DISP INSORB

## (undated) DEVICE — Device: Brand: PORTEX

## (undated) DEVICE — 3M™ STERI-STRIP™ COMPOUND BENZOIN TINCTURE 40 BAGS/CARTON 4 CARTONS/CASE C1544: Brand: 3M™ STERI-STRIP™

## (undated) DEVICE — TUBE BLD COLLECT ST 1 SIL COAT 7ML 10ML

## (undated) DEVICE — CONTAINER SPEC 64OZ POLYPR PATH SNAP LOK CAP W/ LID

## (undated) DEVICE — CESAREAN BIRTH PACK II: Brand: MEDLINE INDUSTRIES, INC.

## (undated) DEVICE — GLOVE SURG SZ 65 L12IN FNGR THK83MIL CRM POLYISOPRENE

## (undated) DEVICE — 3000CC GUARDIAN II: Brand: GUARDIAN

## (undated) DEVICE — CONTAINER,SPEC,PNEUM TUBE,3OZ,STRL PATH: Brand: MEDLINE

## (undated) DEVICE — APPLICATOR PREP 26ML 0.7% IOD POVACRYLEX 74% ISO ALC ST

## (undated) DEVICE — SHEET,DRAPE,53X77,STERILE: Brand: MEDLINE

## (undated) DEVICE — TUBING, SUCTION, 3/16" X 12', STRAIGHT: Brand: MEDLINE

## (undated) DEVICE — TOWEL,OR,DSP,ST,BLUE,STD,6/PK,12PK/CS: Brand: MEDLINE

## (undated) DEVICE — CATHETERIZATION KIT FOL16 FR 2000 CC DRAINAGE BG LUBRICATH

## (undated) DEVICE — SPONGE LAP W18XL18IN WHT COT 4 PLY FLD STRUNG RADPQ DISP ST

## (undated) DEVICE — GLOVE SURG SZ 75 L12IN FNGR THK83MIL CRM POLYISOPRENE

## (undated) DEVICE — SUTURE STRATAFIX SPRL SZ 1 L14IN ABSRB VLT L48CM CTX 1/2 SXPD2B405